# Patient Record
Sex: FEMALE | Race: ASIAN | Employment: UNEMPLOYED | URBAN - METROPOLITAN AREA
[De-identification: names, ages, dates, MRNs, and addresses within clinical notes are randomized per-mention and may not be internally consistent; named-entity substitution may affect disease eponyms.]

---

## 2017-01-27 ENCOUNTER — OFFICE VISIT (OUTPATIENT)
Dept: FAMILY MEDICINE CLINIC | Facility: CLINIC | Age: 57
End: 2017-01-27

## 2017-01-27 VITALS
HEIGHT: 63 IN | HEART RATE: 81 BPM | WEIGHT: 182 LBS | RESPIRATION RATE: 18 BRPM | BODY MASS INDEX: 32.25 KG/M2 | OXYGEN SATURATION: 97 % | SYSTOLIC BLOOD PRESSURE: 120 MMHG | TEMPERATURE: 98 F | DIASTOLIC BLOOD PRESSURE: 84 MMHG

## 2017-01-27 DIAGNOSIS — J01.90 ACUTE NON-RECURRENT SINUSITIS, UNSPECIFIED LOCATION: Primary | ICD-10-CM

## 2017-01-27 DIAGNOSIS — H90.5 SENSORINEURAL HEARING LOSS (SNHL) OF RIGHT EAR, UNSPECIFIED HEARING STATUS ON CONTRALATERAL SIDE: ICD-10-CM

## 2017-01-27 DIAGNOSIS — H72.91 PERFORATION OF TYMPANIC MEMBRANE, RIGHT: ICD-10-CM

## 2017-01-27 DIAGNOSIS — I10 ESSENTIAL HYPERTENSION: ICD-10-CM

## 2017-01-27 DIAGNOSIS — M17.0 PRIMARY OSTEOARTHRITIS OF BOTH KNEES: ICD-10-CM

## 2017-01-27 DIAGNOSIS — F41.8 ANXIETY ASSOCIATED WITH DEPRESSION: ICD-10-CM

## 2017-01-27 PROCEDURE — 99214 OFFICE O/P EST MOD 30 MIN: CPT | Performed by: FAMILY MEDICINE

## 2017-01-27 RX ORDER — FLUTICASONE PROPIONATE 50 MCG
2 SPRAY, SUSPENSION (ML) NASAL DAILY
Qty: 16 G | Refills: 0 | Status: SHIPPED | OUTPATIENT
Start: 2017-01-27 | End: 2017-03-27 | Stop reason: ALTCHOICE

## 2017-01-27 RX ORDER — CODEINE PHOSPHATE AND GUAIFENESIN 10; 100 MG/5ML; MG/5ML
SOLUTION ORAL
Qty: 180 ML | Refills: 0 | Status: SHIPPED | OUTPATIENT
Start: 2017-01-27 | End: 2017-03-27 | Stop reason: ALTCHOICE

## 2017-01-27 RX ORDER — AMOXICILLIN 875 MG/1
875 TABLET, COATED ORAL 2 TIMES DAILY
Qty: 20 TABLET | Refills: 0 | Status: SHIPPED | OUTPATIENT
Start: 2017-01-27 | End: 2017-03-27 | Stop reason: ALTCHOICE

## 2017-01-27 RX ORDER — ESCITALOPRAM OXALATE 10 MG/1
10 TABLET ORAL DAILY
Qty: 90 TABLET | Refills: 2 | Status: SHIPPED | OUTPATIENT
Start: 2017-01-27 | End: 2017-05-03 | Stop reason: ALTCHOICE

## 2017-01-27 NOTE — PROGRESS NOTES
Anayeli Martínez is a 55year old female. HPI:  Cough for one week and seems to be getting worse.    Started w/ runny nose and congestion, sore throat  Took Claritin and seemed to help w/ runny nose but nasal congestion is worse and has cough for a few days capsule (20 mg total) by mouth every morning. Disp: 90 capsule Rfl: 1   Lisinopril-Hydrochlorothiazide 10-12.5 MG Oral Tab Take 1 tablet by mouth daily.  Disp: 90 tablet Rfl: 2   Elastic Bandages & Supports (WRIST BRACE DELUXE/RIGHT S/M) Does not apply Misc mucosa: Red, edematous, cloudy drainage  NECK: supple, tender anterior cervical lymphadenopathy  LUNGS: clear to auscultation  CARDIO: RRR without murmur  EXTREMITIES: no cyanosis, clubbing or edema  NEURO: A&O x3, no focal deficits  Normal gait    ASSESSM

## 2017-03-22 ENCOUNTER — TELEPHONE (OUTPATIENT)
Dept: FAMILY MEDICINE CLINIC | Facility: CLINIC | Age: 57
End: 2017-03-22

## 2017-03-27 ENCOUNTER — OFFICE VISIT (OUTPATIENT)
Dept: FAMILY MEDICINE CLINIC | Facility: CLINIC | Age: 57
End: 2017-03-27

## 2017-03-27 ENCOUNTER — TELEPHONE (OUTPATIENT)
Dept: INTERNAL MEDICINE CLINIC | Facility: CLINIC | Age: 57
End: 2017-03-27

## 2017-03-27 DIAGNOSIS — H91.93 BILATERAL HEARING LOSS, UNSPECIFIED HEARING LOSS TYPE: ICD-10-CM

## 2017-03-27 DIAGNOSIS — G43.009 MIGRAINE WITHOUT AURA AND WITHOUT STATUS MIGRAINOSUS, NOT INTRACTABLE: Primary | ICD-10-CM

## 2017-03-27 DIAGNOSIS — H93.13 TINNITUS, BILATERAL: ICD-10-CM

## 2017-03-27 DIAGNOSIS — H72.93 PERFORATION OF TYMPANIC MEMBRANE, BILATERAL: ICD-10-CM

## 2017-03-27 PROCEDURE — 99214 OFFICE O/P EST MOD 30 MIN: CPT | Performed by: FAMILY MEDICINE

## 2017-03-27 RX ORDER — AMOXICILLIN 250 MG/1
250 CAPSULE ORAL 3 TIMES DAILY
COMMUNITY
End: 2017-05-03 | Stop reason: ALTCHOICE

## 2017-03-27 RX ORDER — MELOXICAM 15 MG/1
15 TABLET ORAL DAILY
COMMUNITY
End: 2017-11-03

## 2017-03-27 RX ORDER — BUTALBITAL, ACETAMINOPHEN AND CAFFEINE 300; 40; 50 MG/1; MG/1; MG/1
1 CAPSULE ORAL EVERY 6 HOURS PRN
Qty: 30 CAPSULE | Refills: 0 | Status: SHIPPED | OUTPATIENT
Start: 2017-03-27 | End: 2017-10-20

## 2017-03-27 NOTE — PATIENT INSTRUCTIONS
Preventing Migraine Headaches: Medicines and Lifestyle Changes     Going to bed and getting up at the same time each day, including weekends, may help prevent migraines. A migraine is a type of severe headache. Having a migraine can be very painful. Date Last Reviewed: 10/11/2015  © 2695-6024 The [address] [place] [address], [address] [place], [place] [address]. All rights reserved. This information is not intended as a substitute for professional medical care.  Always follow your healthcare Volanda Plant

## 2017-03-27 NOTE — PROGRESS NOTES
HPI:    Patient ID: Frankie Napoles is a 55year old female. HPI   50yr old female presents with daughter with c/o migraine ha and chronic ear complaints. Has chronic hx of migraine ha.  This current migraine lasting for the past week, has been experien Radiology Contrast *    Rash   PHYSICAL EXAM:   Physical Exam   Constitutional: She is oriented to person, place, and time. She appears well-developed and well-nourished. HENT:   Head: Normocephalic and atraumatic.    Right Ear: Hearing, external ear an every 6 (six) hours as needed for Pain or Headaches.            Imaging & Referrals:  ENT - INTERNAL       KM#[id number]

## 2017-03-27 NOTE — TELEPHONE ENCOUNTER
Pharmacy called stating Rx written for Butalbital-APAP-Caffeine -40 doesn't come in formulation written. I gave verbal ok for -40 formulation, same instructions.

## 2017-03-29 VITALS
TEMPERATURE: 98 F | RESPIRATION RATE: 16 BRPM | HEART RATE: 80 BPM | DIASTOLIC BLOOD PRESSURE: 90 MMHG | HEIGHT: 63 IN | SYSTOLIC BLOOD PRESSURE: 132 MMHG | OXYGEN SATURATION: 99 % | BODY MASS INDEX: 32.25 KG/M2 | WEIGHT: 182 LBS

## 2017-05-03 ENCOUNTER — OFFICE VISIT (OUTPATIENT)
Dept: FAMILY MEDICINE CLINIC | Facility: CLINIC | Age: 57
End: 2017-05-03

## 2017-05-03 VITALS
HEIGHT: 63 IN | SYSTOLIC BLOOD PRESSURE: 136 MMHG | DIASTOLIC BLOOD PRESSURE: 86 MMHG | HEART RATE: 86 BPM | WEIGHT: 186 LBS | RESPIRATION RATE: 14 BRPM | BODY MASS INDEX: 32.96 KG/M2 | OXYGEN SATURATION: 98 % | TEMPERATURE: 98 F

## 2017-05-03 DIAGNOSIS — J01.00 ACUTE MAXILLARY SINUSITIS, RECURRENCE NOT SPECIFIED: Primary | ICD-10-CM

## 2017-05-03 DIAGNOSIS — R11.0 NAUSEA: ICD-10-CM

## 2017-05-03 DIAGNOSIS — E04.1 THYROID NODULE: ICD-10-CM

## 2017-05-03 DIAGNOSIS — E01.0 THYROMEGALY: ICD-10-CM

## 2017-05-03 DIAGNOSIS — I10 ESSENTIAL HYPERTENSION: ICD-10-CM

## 2017-05-03 DIAGNOSIS — G43.009 MIGRAINE WITHOUT AURA AND WITHOUT STATUS MIGRAINOSUS, NOT INTRACTABLE: ICD-10-CM

## 2017-05-03 PROCEDURE — S0119 ONDANSETRON 4 MG: HCPCS | Performed by: FAMILY MEDICINE

## 2017-05-03 PROCEDURE — 99214 OFFICE O/P EST MOD 30 MIN: CPT | Performed by: FAMILY MEDICINE

## 2017-05-03 RX ORDER — ONDANSETRON 4 MG/1
4 TABLET, ORALLY DISINTEGRATING ORAL ONCE
Status: COMPLETED | OUTPATIENT
Start: 2017-05-03 | End: 2017-05-03

## 2017-05-03 RX ORDER — LISINOPRIL AND HYDROCHLOROTHIAZIDE 12.5; 1 MG/1; MG/1
1 TABLET ORAL DAILY
Qty: 90 TABLET | Refills: 1 | Status: SHIPPED | OUTPATIENT
Start: 2017-05-03 | End: 2017-07-05

## 2017-05-03 RX ORDER — AZITHROMYCIN 250 MG/1
TABLET, FILM COATED ORAL
Qty: 6 TABLET | Refills: 0 | Status: SHIPPED | OUTPATIENT
Start: 2017-05-03 | End: 2017-10-20 | Stop reason: ALTCHOICE

## 2017-05-03 RX ORDER — ONDANSETRON 4 MG/1
4 TABLET, ORALLY DISINTEGRATING ORAL EVERY 8 HOURS PRN
Qty: 20 TABLET | Refills: 0 | Status: SHIPPED | OUTPATIENT
Start: 2017-05-03 | End: 2018-06-22 | Stop reason: ALTCHOICE

## 2017-05-03 RX ADMIN — ONDANSETRON 4 MG: 4 TABLET, ORALLY DISINTEGRATING ORAL at 12:13:00

## 2017-05-03 NOTE — PROGRESS NOTES
HPI:   Angelina Montano is a 55year old female who presents for upper respiratory symptoms for  12  days. Patient reports sore throat, congestion, low grade fever, dry cough, sinus pain, OTC cold meds have not been helping.  Recently returned from [facility] [address] Coronary artery disease[other] [OTHER] Father    • TB[other] [OTHER] Father    • Dementia Mother    • Hypertension Mother    • Breast Cancer Sister 36     estimated age   • Coronary artery disease[other] [OTHER] Brother         Smoking Status: Never Smoker intractable  3.  Nausea  - neuro exam unremarkable  - cont conservative tx  - has taken fioricet earlier today and symptoms improving, cont prn  - given zofran in office and provided rx   - avoidance of triggers and monitor for worsening symptoms  - ondanse

## 2017-07-05 ENCOUNTER — OFFICE VISIT (OUTPATIENT)
Dept: FAMILY MEDICINE CLINIC | Facility: CLINIC | Age: 57
End: 2017-07-05

## 2017-07-05 VITALS
SYSTOLIC BLOOD PRESSURE: 130 MMHG | HEIGHT: 63 IN | OXYGEN SATURATION: 99 % | TEMPERATURE: 99 F | BODY MASS INDEX: 32.47 KG/M2 | HEART RATE: 85 BPM | RESPIRATION RATE: 16 BRPM | DIASTOLIC BLOOD PRESSURE: 82 MMHG | WEIGHT: 183.25 LBS

## 2017-07-05 DIAGNOSIS — I10 ESSENTIAL HYPERTENSION: ICD-10-CM

## 2017-07-05 DIAGNOSIS — H72.92 PERFORATED TYMPANIC MEMBRANE, LEFT: ICD-10-CM

## 2017-07-05 DIAGNOSIS — R07.89 CHEST PRESSURE: Primary | ICD-10-CM

## 2017-07-05 DIAGNOSIS — R06.00 DOE (DYSPNEA ON EXERTION): ICD-10-CM

## 2017-07-05 DIAGNOSIS — H93.13 BILATERAL TINNITUS: ICD-10-CM

## 2017-07-05 PROCEDURE — 99214 OFFICE O/P EST MOD 30 MIN: CPT | Performed by: FAMILY MEDICINE

## 2017-07-05 PROCEDURE — 93000 ELECTROCARDIOGRAM COMPLETE: CPT | Performed by: FAMILY MEDICINE

## 2017-07-05 RX ORDER — LISINOPRIL AND HYDROCHLOROTHIAZIDE 12.5; 1 MG/1; MG/1
1 TABLET ORAL DAILY
Qty: 90 TABLET | Refills: 1 | Status: SHIPPED | OUTPATIENT
Start: 2017-07-05 | End: 2018-04-19

## 2017-07-09 NOTE — PROGRESS NOTES
HPI:    Patient ID: Jc De La O is a 55year old female. HPI  50yr old female presents with c/o intermittent, chest pain, MCLAIN, f/u on tinnitus and HTN.   States she has had 3 episodes of L sided anterior cp that lasted about 20 min after she was doin Contrast *    Rash   PHYSICAL EXAM:   Physical Exam   Constitutional: She is oriented to person, place, and time. She appears well-developed and well-nourished. HENT:   Head: Normocephalic and atraumatic.    Right Ear: Hearing, external ear and ear canal Perforated tympanic membrane, left  - advised starting claritin 10mg po qpm, reviewed indications, dosing and SEs  - will refer to ENT, Dr. Beth Brizuela for further eval  - ENT - INTERNAL    Pt understands and agrees with tx plan  RTC after stress test, soone

## 2017-07-10 ENCOUNTER — MED REC SCAN ONLY (OUTPATIENT)
Dept: FAMILY MEDICINE CLINIC | Facility: CLINIC | Age: 57
End: 2017-07-10

## 2017-07-10 PROBLEM — H90.3 SENSORINEURAL HEARING LOSS, BILATERAL: Status: ACTIVE | Noted: 2017-07-10

## 2017-07-20 ENCOUNTER — HOSPITAL ENCOUNTER (OUTPATIENT)
Dept: CV DIAGNOSTICS | Age: 57
Discharge: HOME | End: 2017-07-20
Attending: FAMILY MEDICINE
Payer: COMMERCIAL

## 2017-07-20 DIAGNOSIS — I10 ESSENTIAL HYPERTENSION: ICD-10-CM

## 2017-07-20 DIAGNOSIS — R06.00 DOE (DYSPNEA ON EXERTION): ICD-10-CM

## 2017-07-20 DIAGNOSIS — R07.89 CHEST PRESSURE: ICD-10-CM

## 2017-07-20 PROCEDURE — 93018 CV STRESS TEST I&R ONLY: CPT | Performed by: FAMILY MEDICINE

## 2017-07-20 PROCEDURE — 78452 HT MUSCLE IMAGE SPECT MULT: CPT | Performed by: FAMILY MEDICINE

## 2017-07-20 PROCEDURE — 93017 CV STRESS TEST TRACING ONLY: CPT | Performed by: FAMILY MEDICINE

## 2017-07-30 ENCOUNTER — HOSPITAL ENCOUNTER (OUTPATIENT)
Dept: ULTRASOUND IMAGING | Age: 57
Discharge: HOME | End: 2017-07-30
Attending: FAMILY MEDICINE
Payer: COMMERCIAL

## 2017-07-30 DIAGNOSIS — E01.0 THYROMEGALY: ICD-10-CM

## 2017-07-30 DIAGNOSIS — E04.1 THYROID NODULE: ICD-10-CM

## 2017-07-30 PROCEDURE — 76536 US EXAM OF HEAD AND NECK: CPT | Performed by: FAMILY MEDICINE

## 2017-10-20 ENCOUNTER — OFFICE VISIT (OUTPATIENT)
Dept: FAMILY MEDICINE CLINIC | Facility: CLINIC | Age: 57
End: 2017-10-20

## 2017-10-20 VITALS
DIASTOLIC BLOOD PRESSURE: 82 MMHG | SYSTOLIC BLOOD PRESSURE: 130 MMHG | BODY MASS INDEX: 33.52 KG/M2 | WEIGHT: 189.19 LBS | TEMPERATURE: 98 F | RESPIRATION RATE: 16 BRPM | HEART RATE: 92 BPM | HEIGHT: 63 IN | OXYGEN SATURATION: 98 %

## 2017-10-20 DIAGNOSIS — G89.29 CHRONIC PAIN OF LEFT KNEE: ICD-10-CM

## 2017-10-20 DIAGNOSIS — M17.12 PRIMARY OSTEOARTHRITIS OF LEFT KNEE: ICD-10-CM

## 2017-10-20 DIAGNOSIS — R73.01 IMPAIRED FASTING GLUCOSE: ICD-10-CM

## 2017-10-20 DIAGNOSIS — M25.562 CHRONIC PAIN OF LEFT KNEE: ICD-10-CM

## 2017-10-20 DIAGNOSIS — E55.9 VITAMIN D DEFICIENCY: ICD-10-CM

## 2017-10-20 DIAGNOSIS — G43.009 MIGRAINE WITHOUT AURA AND WITHOUT STATUS MIGRAINOSUS, NOT INTRACTABLE: Primary | ICD-10-CM

## 2017-10-20 DIAGNOSIS — Z00.00 LABORATORY EXAM ORDERED AS PART OF ROUTINE GENERAL MEDICAL EXAMINATION: ICD-10-CM

## 2017-10-20 PROCEDURE — 99214 OFFICE O/P EST MOD 30 MIN: CPT | Performed by: FAMILY MEDICINE

## 2017-10-20 RX ORDER — BUTALBITAL, ACETAMINOPHEN AND CAFFEINE 300; 40; 50 MG/1; MG/1; MG/1
1 CAPSULE ORAL EVERY 6 HOURS PRN
Qty: 30 CAPSULE | Refills: 0 | Status: SHIPPED | OUTPATIENT
Start: 2017-10-20 | End: 2017-11-03

## 2017-10-20 NOTE — PROGRESS NOTES
HPI:    Patient ID: Angelina Montano is a 55year old female. HPI   50yr old female presents with c/o migraine headaches and chronic L knee pain. Chronic migraines, this episode started again about 2d ago.  Notes L sided behind her eye, with photophobia Rfl: 1     Allergies:  Imitrex [Sumatripta*    Nausea only    Comment:bleeding  Imitrex [Sumatripta*    Hives  Nexium [Esomeprazol*    Itching  Pollen                    Radiology Contrast *    Rash   PHYSICAL EXAM:   Physical Exam   Constitutional: She is INTERNAL    4. Vitamin D deficiency  - cont vitamin d3 2000u daily  - VITAMIN D, 25-HYDROXY; Future    5. Laboratory exam ordered as part of routine general medical examination  6.  Impaired fasting glucose  - will check fasting labs    Pt declines influenz

## 2017-10-24 ENCOUNTER — LAB ENCOUNTER (OUTPATIENT)
Dept: LAB | Age: 57
End: 2017-10-24
Attending: FAMILY MEDICINE
Payer: COMMERCIAL

## 2017-10-24 DIAGNOSIS — R73.01 IMPAIRED FASTING GLUCOSE: ICD-10-CM

## 2017-10-24 DIAGNOSIS — E55.9 VITAMIN D DEFICIENCY: ICD-10-CM

## 2017-10-24 DIAGNOSIS — Z00.00 LABORATORY EXAM ORDERED AS PART OF ROUTINE GENERAL MEDICAL EXAMINATION: ICD-10-CM

## 2017-10-24 PROCEDURE — 36415 COLL VENOUS BLD VENIPUNCTURE: CPT

## 2017-10-24 PROCEDURE — 82306 VITAMIN D 25 HYDROXY: CPT

## 2017-10-24 PROCEDURE — 83036 HEMOGLOBIN GLYCOSYLATED A1C: CPT

## 2017-10-24 PROCEDURE — 80053 COMPREHEN METABOLIC PANEL: CPT

## 2017-10-24 PROCEDURE — 80061 LIPID PANEL: CPT

## 2017-10-24 PROCEDURE — 85025 COMPLETE CBC W/AUTO DIFF WBC: CPT

## 2017-10-24 PROCEDURE — 84443 ASSAY THYROID STIM HORMONE: CPT

## 2017-10-28 ENCOUNTER — HOSPITAL ENCOUNTER (EMERGENCY)
Facility: HOSPITAL | Age: 57
Discharge: HOME | End: 2017-10-29
Attending: EMERGENCY MEDICINE
Payer: COMMERCIAL

## 2017-10-28 ENCOUNTER — TELEPHONE (OUTPATIENT)
Dept: FAMILY MEDICINE CLINIC | Facility: CLINIC | Age: 57
End: 2017-10-28

## 2017-10-28 ENCOUNTER — APPOINTMENT (OUTPATIENT)
Dept: CT IMAGING | Facility: HOSPITAL | Age: 57
End: 2017-10-28
Attending: EMERGENCY MEDICINE
Payer: COMMERCIAL

## 2017-10-28 VITALS
WEIGHT: 187.38 LBS | HEART RATE: 88 BPM | TEMPERATURE: 99 F | BODY MASS INDEX: 33.2 KG/M2 | HEIGHT: 63 IN | OXYGEN SATURATION: 97 % | RESPIRATION RATE: 20 BRPM | SYSTOLIC BLOOD PRESSURE: 127 MMHG | DIASTOLIC BLOOD PRESSURE: 58 MMHG

## 2017-10-28 DIAGNOSIS — R51.9 RECURRENT HEADACHE: Primary | ICD-10-CM

## 2017-10-28 PROCEDURE — 96375 TX/PRO/DX INJ NEW DRUG ADDON: CPT

## 2017-10-28 PROCEDURE — 96374 THER/PROPH/DIAG INJ IV PUSH: CPT

## 2017-10-28 PROCEDURE — 85025 COMPLETE CBC W/AUTO DIFF WBC: CPT | Performed by: EMERGENCY MEDICINE

## 2017-10-28 PROCEDURE — 96361 HYDRATE IV INFUSION ADD-ON: CPT

## 2017-10-28 PROCEDURE — 99284 EMERGENCY DEPT VISIT MOD MDM: CPT

## 2017-10-28 PROCEDURE — 80048 BASIC METABOLIC PNL TOTAL CA: CPT | Performed by: EMERGENCY MEDICINE

## 2017-10-28 PROCEDURE — 70450 CT HEAD/BRAIN W/O DYE: CPT | Performed by: EMERGENCY MEDICINE

## 2017-10-28 RX ORDER — KETOROLAC TROMETHAMINE 30 MG/ML
30 INJECTION, SOLUTION INTRAMUSCULAR; INTRAVENOUS ONCE
Status: COMPLETED | OUTPATIENT
Start: 2017-10-28 | End: 2017-10-28

## 2017-10-28 RX ORDER — ONDANSETRON 2 MG/ML
4 INJECTION INTRAMUSCULAR; INTRAVENOUS ONCE
Status: COMPLETED | OUTPATIENT
Start: 2017-10-28 | End: 2017-10-28

## 2017-10-28 RX ORDER — DIPHENHYDRAMINE HYDROCHLORIDE 50 MG/ML
25 INJECTION INTRAMUSCULAR; INTRAVENOUS ONCE
Status: COMPLETED | OUTPATIENT
Start: 2017-10-28 | End: 2017-10-28

## 2017-10-28 RX ORDER — SODIUM CHLORIDE 9 MG/ML
1000 INJECTION, SOLUTION INTRAVENOUS ONCE
Status: COMPLETED | OUTPATIENT
Start: 2017-10-28 | End: 2017-10-28

## 2017-10-29 NOTE — ED PROVIDER NOTES
Patient Seen in: [facility] Emergency Department    History   Patient presents with:  Headache (neurologic)    Stated Complaint: MIGRAINE    HPI    60-year-old woman coming in with headache started about a week ago gradual onset.   Patient has had na reviewed. All other systems reviewed and negative except as noted above. PSFH elements reviewed from today and agreed except as otherwise stated in HPI. Physical Exam   ED Triage Vitals [10/28/17 2205]  BP: 156/81  Pulse: 76  Resp: 24  Temp: 98. ---------                               -----------         ------                     CBC W/ DIFFERENTIAL[[id number]]          Abnormal            Final result                 Please view results for these tests on the individual orders.    RAINBOW DRAW L rule out infectious etiology although my suspicion for this is low. It is probably related to tension. Regardless, patient refused this bar puncture. She would like to go home at this time.   She was given detailed verbal and written instructions about h

## 2017-10-29 NOTE — TELEPHONE ENCOUNTER
Pls call pt and check on status?  F/u in office this week to check on status, review labs, and due for physical.

## 2017-10-29 NOTE — ED NOTES
Pt presents holding head. Will not uncover head due to pain. Family with her. Needs assist to get into gown.   Family states pt's migraine pattern is like this especially during a 24hr period during her migraine episodes

## 2017-10-29 NOTE — TELEPHONE ENCOUNTER
called stating he was taking his wife to ER for persistent severe migraine headache.  Asked if he could take her to [facility] as it's closer, but I recommended Carlo Chavez since we don't get records directly or promptly from [address] [place] [address]

## 2017-10-30 NOTE — TELEPHONE ENCOUNTER
----- Message from [facility], DO sent at 10/29/2017 11:22 PM CDT -----  Pls have pt f/u in office for test results.

## 2017-10-31 PROBLEM — M17.12 PRIMARY OSTEOARTHRITIS OF LEFT KNEE: Status: ACTIVE | Noted: 2017-10-31

## 2017-11-01 ENCOUNTER — TELEPHONE (OUTPATIENT)
Dept: FAMILY MEDICINE CLINIC | Facility: CLINIC | Age: 57
End: 2017-11-01

## 2017-11-01 NOTE — TELEPHONE ENCOUNTER
Left message for Crispin regarding patient, physical therapy was ordered by Dr. Porfirio Kim yesterday. Patient may call to schedule. Provided phone number for [facility]. May call if there are questions.

## 2017-11-01 NOTE — TELEPHONE ENCOUNTER
Pt's  called stating Pt saw Dr [place] Fernandez for the knee problems, was given an injection & was told she needs to do Physical therapy. Orders needed.

## 2017-11-02 ENCOUNTER — TELEPHONE (OUTPATIENT)
Dept: FAMILY MEDICINE CLINIC | Facility: CLINIC | Age: 57
End: 2017-11-02

## 2017-11-02 DIAGNOSIS — G43.009 MIGRAINE WITHOUT AURA AND WITHOUT STATUS MIGRAINOSUS, NOT INTRACTABLE: Primary | ICD-10-CM

## 2017-11-02 RX ORDER — BUTALBITAL, ACETAMINOPHEN AND CAFFEINE 50; 325; 40 MG/1; MG/1; MG/1
1 TABLET ORAL EVERY 6 HOURS PRN
Qty: 30 TABLET | Refills: 0 | Status: SHIPPED | OUTPATIENT
Start: 2017-11-02 | End: 2017-11-03

## 2017-11-02 NOTE — TELEPHONE ENCOUNTER
[address] called stating \"Butalbital\" mg needs to be changed. Written as [phone]. Come in [phone].

## 2017-11-03 ENCOUNTER — OFFICE VISIT (OUTPATIENT)
Dept: FAMILY MEDICINE CLINIC | Facility: CLINIC | Age: 57
End: 2017-11-03

## 2017-11-03 VITALS
TEMPERATURE: 99 F | DIASTOLIC BLOOD PRESSURE: 82 MMHG | BODY MASS INDEX: 33.13 KG/M2 | HEIGHT: 63 IN | HEART RATE: 93 BPM | OXYGEN SATURATION: 97 % | RESPIRATION RATE: 18 BRPM | SYSTOLIC BLOOD PRESSURE: 130 MMHG | WEIGHT: 187 LBS

## 2017-11-03 DIAGNOSIS — I10 ESSENTIAL HYPERTENSION: ICD-10-CM

## 2017-11-03 DIAGNOSIS — R10.9 FLANK PAIN: ICD-10-CM

## 2017-11-03 DIAGNOSIS — H92.03 OTALGIA OF BOTH EARS: ICD-10-CM

## 2017-11-03 DIAGNOSIS — Z12.11 SCREENING FOR COLON CANCER: ICD-10-CM

## 2017-11-03 DIAGNOSIS — R73.01 IFG (IMPAIRED FASTING GLUCOSE): ICD-10-CM

## 2017-11-03 DIAGNOSIS — L98.9 LESION OF SKIN OF FACE: ICD-10-CM

## 2017-11-03 DIAGNOSIS — R10.2 PELVIC PAIN: ICD-10-CM

## 2017-11-03 DIAGNOSIS — Z00.00 ROUTINE PHYSICAL EXAMINATION: Primary | ICD-10-CM

## 2017-11-03 DIAGNOSIS — Z80.3 FAMILY HISTORY OF BREAST CANCER: ICD-10-CM

## 2017-11-03 DIAGNOSIS — G43.009 MIGRAINE WITHOUT AURA AND WITHOUT STATUS MIGRAINOSUS, NOT INTRACTABLE: ICD-10-CM

## 2017-11-03 DIAGNOSIS — K21.9 GASTROESOPHAGEAL REFLUX DISEASE WITHOUT ESOPHAGITIS: ICD-10-CM

## 2017-11-03 DIAGNOSIS — Z23 NEED FOR INFLUENZA VACCINATION: ICD-10-CM

## 2017-11-03 DIAGNOSIS — N64.4 BREAST TENDERNESS IN FEMALE: ICD-10-CM

## 2017-11-03 PROCEDURE — 81003 URINALYSIS AUTO W/O SCOPE: CPT | Performed by: FAMILY MEDICINE

## 2017-11-03 PROCEDURE — 90471 IMMUNIZATION ADMIN: CPT | Performed by: FAMILY MEDICINE

## 2017-11-03 PROCEDURE — 99214 OFFICE O/P EST MOD 30 MIN: CPT | Performed by: FAMILY MEDICINE

## 2017-11-03 PROCEDURE — 90686 IIV4 VACC NO PRSV 0.5 ML IM: CPT | Performed by: FAMILY MEDICINE

## 2017-11-03 PROCEDURE — 99396 PREV VISIT EST AGE 40-64: CPT | Performed by: FAMILY MEDICINE

## 2017-11-03 RX ORDER — TOPIRAMATE 50 MG/1
TABLET, FILM COATED ORAL
Qty: 30 TABLET | Refills: 2 | Status: SHIPPED | OUTPATIENT
Start: 2017-11-03 | End: 2018-06-22 | Stop reason: ALTCHOICE

## 2017-11-03 RX ORDER — HYDROCODONE BITARTRATE AND ACETAMINOPHEN 5; 325 MG/1; MG/1
1 TABLET ORAL EVERY 8 HOURS PRN
Qty: 40 TABLET | Refills: 0 | Status: SHIPPED | OUTPATIENT
Start: 2017-11-03 | End: 2018-08-27 | Stop reason: ALTCHOICE

## 2017-11-03 RX ORDER — ACETIC ACID 20.65 MG/ML
2 SOLUTION AURICULAR (OTIC) 3 TIMES DAILY PRN
Qty: 1 BOTTLE | Refills: 0 | Status: SHIPPED | OUTPATIENT
Start: 2017-11-03 | End: 2018-08-27 | Stop reason: ALTCHOICE

## 2017-11-03 RX ORDER — OMEPRAZOLE 20 MG/1
20 CAPSULE, DELAYED RELEASE ORAL EVERY MORNING
Qty: 90 CAPSULE | Refills: 2 | Status: SHIPPED | OUTPATIENT
Start: 2017-11-03 | End: 2018-08-27

## 2017-11-03 NOTE — PROGRESS NOTES
Beth Warren is a 55year old female.   HPI:  Patient is here for physical today  Also for f/u on recent ER visit for severe migraine headache  Has been having frequent migraines and some are very intense  Usually Starts on top of head then generalizes, Tablet Dispersible Take 1 tablet (4 mg total) by mouth every 8 (eight) hours as needed for Nausea. Disp: 20 tablet Rfl: 0   aspirin 81 MG Oral Tab Take 1 tablet (81 mg total) by mouth daily.  Disp: 30 tablet Rfl: 11         Past Medical History:   Diagnosis adenopathy,noTM  LUNGS: clear to auscultation  CARDIO: RRR without murmur  GI: NABS, soft, obese, no tenderness, no masses, no HSM, ND  No CVAT  EXTREMITIES: no cyanosis, clubbing or edema  Left knee with medial joint line tenderness.   Mild swelling versus nightly, 25 mg nightly for 6 days, then 50 mg nightly. Discussed possible side effects, and to call if any. Norco as needed. Warned of possible side effects including sedation and nausea. Avoid dietary triggers.   Advised on coping mechanisms for stress

## 2017-11-07 ENCOUNTER — OFFICE VISIT (OUTPATIENT)
Dept: PHYSICAL THERAPY | Age: 57
End: 2017-11-07
Attending: ORTHOPAEDIC SURGERY
Payer: COMMERCIAL

## 2017-11-07 DIAGNOSIS — M25.562 LEFT KNEE PAIN, UNSPECIFIED CHRONICITY: ICD-10-CM

## 2017-11-07 DIAGNOSIS — M17.12 PRIMARY OSTEOARTHRITIS OF LEFT KNEE: ICD-10-CM

## 2017-11-07 PROCEDURE — 97110 THERAPEUTIC EXERCISES: CPT

## 2017-11-07 PROCEDURE — 97162 PT EVAL MOD COMPLEX 30 MIN: CPT

## 2017-11-07 PROCEDURE — 97140 MANUAL THERAPY 1/> REGIONS: CPT

## 2017-11-07 NOTE — PROGRESS NOTES
KNEE EVALUATION:   Referring Physician: Dr. Frederick Sevilla  Diagnosis: Left knee pain, unspecified chronicity, Primary osteoarthritis of left knee     Date of Service: 11/7/2017     PATIENT SUMMARY   Kathy Eubanks is a 55year old female who presents to therapy to better following PT for knees in the past. Pt goals include to decrease pain and be able to walk better so that she can run errands and take care of her home. Past medical history was reviewed with Fernanda.  Significant findings include:  Past Medical His waddling pattern, wide LISA, and minimal hip/knee flexion in swing. Observation/Skin: LE skin closed and intact without any notable bruising or redness. Edema of both knees, L>R.    Palpation: Patient extremely TTP from groin to inferior patellar poles ERIC Time: 65 min     PLAN OF CARE:    Goals: (To be met in 10 visits)   1. Patient will improve ERIC knee AROM flexion to > 115 degrees to improve ability to perform squatting, stair negotiation, sitting comfortably, and dressing.    2. Patient will improve quad

## 2017-11-09 ENCOUNTER — TELEPHONE (OUTPATIENT)
Dept: FAMILY MEDICINE CLINIC | Facility: CLINIC | Age: 57
End: 2017-11-09

## 2017-11-09 ENCOUNTER — OFFICE VISIT (OUTPATIENT)
Dept: PHYSICAL THERAPY | Age: 57
End: 2017-11-09
Attending: ORTHOPAEDIC SURGERY
Payer: COMMERCIAL

## 2017-11-09 PROCEDURE — 97140 MANUAL THERAPY 1/> REGIONS: CPT

## 2017-11-09 PROCEDURE — 97110 THERAPEUTIC EXERCISES: CPT

## 2017-11-09 NOTE — TELEPHONE ENCOUNTER
Confirmed with patients pharmacy that prescription for butalbital was discontinued and patient is no longer taking this

## 2017-11-09 NOTE — PROGRESS NOTES
Dx: Left knee pain, unspecified chronicity, Primary osteoarthritis of left knee             Authorized # of Visits:  10         Next MD visit: none scheduled  Fall Risk: standard         Precautions: n/a             Subjective: Patient reports that she tri 6/ Date:               TX#: 7/ Date:               TX#: 8/   NuStep L 1 x 4 min         - QS x 10 R/L  - supine alt march x 10 R/L         - Heel slides x 10 R/L  - Mini bridge x 10         - hooklying add ball ball squeeze x 10 R/L  - seated abd YTB x 10

## 2017-11-13 ENCOUNTER — HOSPITAL ENCOUNTER (OUTPATIENT)
Dept: ULTRASOUND IMAGING | Age: 57
Discharge: HOME | End: 2017-11-13
Attending: FAMILY MEDICINE
Payer: COMMERCIAL

## 2017-11-13 DIAGNOSIS — R10.2 PELVIC PAIN: ICD-10-CM

## 2017-11-13 PROCEDURE — 76856 US EXAM PELVIC COMPLETE: CPT | Performed by: FAMILY MEDICINE

## 2017-11-13 PROCEDURE — 76830 TRANSVAGINAL US NON-OB: CPT | Performed by: FAMILY MEDICINE

## 2017-11-16 ENCOUNTER — OFFICE VISIT (OUTPATIENT)
Dept: PHYSICAL THERAPY | Age: 57
End: 2017-11-16
Attending: ORTHOPAEDIC SURGERY
Payer: COMMERCIAL

## 2017-11-16 PROCEDURE — 97140 MANUAL THERAPY 1/> REGIONS: CPT

## 2017-11-16 PROCEDURE — 97110 THERAPEUTIC EXERCISES: CPT

## 2017-11-16 NOTE — PROGRESS NOTES
Dx: Left knee pain, unspecified chronicity, Primary osteoarthritis of left knee             Authorized # of Visits:  10         Next MD visit: none scheduled  Fall Risk: standard         Precautions: n/a             Subjective: Patient reports continues to Date:               TX#: 8/   NuStep L 1 x 4 min NuStep L 1 x 4 min        - QS x 10 R/L  - supine alt march x 10 R/L - QS x 10 R/L  - SLR x 10 R/L        - Heel slides x 10 R/L  - Mini bridge x 10     - slight inc height x 10        - hooklying add ball b

## 2017-11-28 ENCOUNTER — OFFICE VISIT (OUTPATIENT)
Dept: PHYSICAL THERAPY | Age: 57
End: 2017-11-28
Attending: ORTHOPAEDIC SURGERY
Payer: COMMERCIAL

## 2017-11-28 PROCEDURE — 97110 THERAPEUTIC EXERCISES: CPT

## 2017-11-28 PROCEDURE — 97140 MANUAL THERAPY 1/> REGIONS: CPT

## 2017-11-28 NOTE — PROGRESS NOTES
Dx: Left knee pain, unspecified chronicity, Primary osteoarthritis of left knee             Authorized # of Visits:  10         Next MD visit: none scheduled  Fall Risk: standard         Precautions: n/a             Subjective: Patient has not been seen fo issued    Plan: Continue ERIC knee stretching and strengthening as tolerated.  Inc time spent in [facility] as tolerated   Date: 11/9/2017 TX#: 2/10 Date: 11/16/17 TX#: 3/10   Date: 11/28/17 TX#: 4/10 Date:               TX#: 5/ Date:               TX#: 6/ Date:

## 2017-11-30 ENCOUNTER — OFFICE VISIT (OUTPATIENT)
Dept: PHYSICAL THERAPY | Age: 57
End: 2017-11-30
Attending: ORTHOPAEDIC SURGERY
Payer: COMMERCIAL

## 2017-11-30 PROCEDURE — 97140 MANUAL THERAPY 1/> REGIONS: CPT

## 2017-11-30 PROCEDURE — 97110 THERAPEUTIC EXERCISES: CPT

## 2017-11-30 NOTE — PROGRESS NOTES
Dx: Left knee pain, unspecified chronicity, Primary osteoarthritis of left knee             Authorized # of Visits:  10         Next MD visit: none scheduled  Fall Risk: standard         Precautions: n/a             Subjective: Patient reports she felt bet issued    Plan: Continue ERIC knee stretching and strengthening as tolerated. Inc time spent in WB as tolerated. Continue with therex/theract prior to manual therapy.    Date: 11/9/2017 TX#: 2/10 Date: 11/16/17 TX#: 3/10   Date: 11/28/17 TX#: 4/10 Date: 11/3 increase functional loading through LE. Good response to manual therapy end of session.    HEP: heel slides, QS, seated hip abd with YTB, bridges, SLR, seated HS stretch, 4 way hip with YTB, DL HR    Charges: Manual x 1, Therex x 2       Total Timed Treatme

## 2017-12-05 ENCOUNTER — HOSPITAL ENCOUNTER (OUTPATIENT)
Dept: MAMMOGRAPHY | Age: 57
Discharge: HOME | End: 2017-12-05
Attending: FAMILY MEDICINE
Payer: COMMERCIAL

## 2017-12-05 ENCOUNTER — OFFICE VISIT (OUTPATIENT)
Dept: PHYSICAL THERAPY | Age: 57
End: 2017-12-05
Attending: ORTHOPAEDIC SURGERY
Payer: COMMERCIAL

## 2017-12-05 ENCOUNTER — HOSPITAL ENCOUNTER (OUTPATIENT)
Dept: ULTRASOUND IMAGING | Age: 57
Discharge: HOME | End: 2017-12-05
Attending: FAMILY MEDICINE
Payer: COMMERCIAL

## 2017-12-05 DIAGNOSIS — N64.4 BREAST TENDERNESS IN FEMALE: ICD-10-CM

## 2017-12-05 DIAGNOSIS — Z80.3 FAMILY HISTORY OF BREAST CANCER: ICD-10-CM

## 2017-12-05 PROCEDURE — 77066 DX MAMMO INCL CAD BI: CPT | Performed by: FAMILY MEDICINE

## 2017-12-05 PROCEDURE — 76642 ULTRASOUND BREAST LIMITED: CPT | Performed by: FAMILY MEDICINE

## 2017-12-05 PROCEDURE — 97140 MANUAL THERAPY 1/> REGIONS: CPT

## 2017-12-05 PROCEDURE — 97110 THERAPEUTIC EXERCISES: CPT

## 2017-12-05 NOTE — PROGRESS NOTES
Dx: Left knee pain, unspecified chronicity, Primary osteoarthritis of left knee             Authorized # of Visits:  10         Next MD visit: none scheduled  Fall Risk: standard         Precautions: n/a             Subjective: Patient reports that she is tolerated. Continue with therex/theract prior to manual therapy. Follow-up on kinesiotaping response.    Date: 11/9/2017 TX#: 2/10 Date: 11/16/17 TX#: 3/10   Date: 11/28/17 TX#: 4/10 Date: 11/30/17  TX#: 5/10 Date: 12/5/17  TX#: 6/10 Date:               TX# clinic - attempted quad stretch and oscillation, poor tolerance  Attempted manual HS stretch, fair tolerance Seated HS stretch 3 x 20s ea Manual HS and calf stretching x 3 min     Skilled Services: Manual therapy to improve mobility, dec pain, and dec sens

## 2017-12-12 ENCOUNTER — APPOINTMENT (OUTPATIENT)
Dept: PHYSICAL THERAPY | Age: 57
End: 2017-12-12
Attending: ORTHOPAEDIC SURGERY
Payer: COMMERCIAL

## 2017-12-12 ENCOUNTER — OFFICE VISIT (OUTPATIENT)
Dept: PHYSICAL THERAPY | Age: 57
End: 2017-12-12
Attending: ORTHOPAEDIC SURGERY
Payer: COMMERCIAL

## 2017-12-12 PROCEDURE — 97140 MANUAL THERAPY 1/> REGIONS: CPT

## 2017-12-12 PROCEDURE — 97110 THERAPEUTIC EXERCISES: CPT

## 2017-12-12 NOTE — PROGRESS NOTES
Dx: Left knee pain, unspecified chronicity, Primary osteoarthritis of left knee             Authorized # of Visits:  10         Next MD visit: none scheduled  Fall Risk: standard         Precautions: n/a             Subjective: Patient reports that she is tolerated. Continue with therex/theract prior to manual therapy. Follow-up on kinesiotaping response.    Date: 11/9/2017 TX#: 2/10 Date: 11/16/17 TX#: 3/10   Date: 11/28/17 TX#: 4/10 Date: 11/30/17  TX#: 5/10 Date: 12/5/17  TX#: 6/10 Date: 12/12/17   TX#: 7 clinic - attempted quad stretch and oscillation, poor tolerance  Attempted manual HS stretch, fair tolerance Seated HS stretch 3 x 20s ea Manual HS and calf stretching x 3 min     Skilled Services: Manual therapy to improve mobility, dec pain, and dec sens

## 2017-12-12 NOTE — PROGRESS NOTES
Dx: Left knee pain, unspecified chronicity, Primary osteoarthritis of left knee             Authorized # of Visits:  10         Next MD visit: none scheduled  Fall Risk: standard         Precautions: n/a             Subjective: Patient reports not feeling ambulate community distances. - progress  4. Patient will improve SLS to >10s to improve safety and independence with gait on uneven surfaces such as grass. -progress, alt cone stack taps  5.  Patient will be independent and compliant in HEP to maintain pro needed x 10 R/L    - Shuttle  - DLS, 4B x 15 Shuttle  - DLS, 4B x 15  - SLS, 2B x 8 R/L (hard, tired) Shuttle  - DLS, 3B x 10, 4B x 10  - SLS, 2.5B x 10 R/L Shuttle  - DLS, 4B x 15  - SLS, 3B x 10 R/L Shuttle  - DLS, 5B x 15  - SLS, 3B x 15 R/L    BLE STM

## 2017-12-14 ENCOUNTER — APPOINTMENT (OUTPATIENT)
Dept: PHYSICAL THERAPY | Age: 57
End: 2017-12-14
Attending: ORTHOPAEDIC SURGERY
Payer: COMMERCIAL

## 2017-12-28 ENCOUNTER — TELEPHONE (OUTPATIENT)
Dept: PHYSICAL THERAPY | Age: 57
End: 2017-12-28

## 2017-12-28 NOTE — TELEPHONE ENCOUNTER
Have called patient several times in regard to PT over the past few weeks.  Have left several VM, spoken with patient, and spoken with patient's  with instructions on appointment times, clinic address, and phone number to call to follow-up if not com

## 2018-04-19 DIAGNOSIS — I10 ESSENTIAL HYPERTENSION: ICD-10-CM

## 2018-04-20 RX ORDER — LISINOPRIL AND HYDROCHLOROTHIAZIDE 12.5; 1 MG/1; MG/1
TABLET ORAL
Qty: 90 TABLET | Refills: 0 | Status: SHIPPED | OUTPATIENT
Start: 2018-04-20 | End: 2018-08-21

## 2018-04-20 NOTE — TELEPHONE ENCOUNTER
Pt's  called to ck status on refill informed him refill requests take 48-72 hrs for approval, he would like call when Rx is sent in.

## 2018-06-22 ENCOUNTER — OFFICE VISIT (OUTPATIENT)
Dept: FAMILY MEDICINE CLINIC | Facility: CLINIC | Age: 58
End: 2018-06-22

## 2018-06-22 VITALS
RESPIRATION RATE: 18 BRPM | OXYGEN SATURATION: 98 % | TEMPERATURE: 98 F | SYSTOLIC BLOOD PRESSURE: 140 MMHG | HEART RATE: 87 BPM | DIASTOLIC BLOOD PRESSURE: 78 MMHG | HEIGHT: 63 IN | WEIGHT: 181 LBS | BODY MASS INDEX: 32.07 KG/M2

## 2018-06-22 DIAGNOSIS — R53.83 FATIGUE, UNSPECIFIED TYPE: ICD-10-CM

## 2018-06-22 DIAGNOSIS — M17.12 PRIMARY OSTEOARTHRITIS OF LEFT KNEE: ICD-10-CM

## 2018-06-22 DIAGNOSIS — J01.00 ACUTE NON-RECURRENT MAXILLARY SINUSITIS: Primary | ICD-10-CM

## 2018-06-22 DIAGNOSIS — G47.25 JET LAG: ICD-10-CM

## 2018-06-22 PROCEDURE — 99214 OFFICE O/P EST MOD 30 MIN: CPT | Performed by: FAMILY MEDICINE

## 2018-06-22 NOTE — PROGRESS NOTES
HPI:   José Clements is a 65year old female who presents for upper respiratory symptoms, L knee pain, and generalized weakness/fatigue. C/o URI symptoms over the past week.  Returned from a trip to [facility] about 1wk ago, took medications for her symptoms tuberculosis   • Coronary artery disease[other] [OTHER] Father    • TB[other] [OTHER] Father    • Dementia Mother    • Hypertension Mother    • Breast Cancer Sister 36     estimated age   • Coronary artery disease[other] [OTHER] Brother       Smoking statu persist, will check labs    The patient indicates understanding of these issues and agrees to the plan. The patient is asked to return if sx's persist or worsen.

## 2018-08-21 DIAGNOSIS — I10 ESSENTIAL HYPERTENSION: ICD-10-CM

## 2018-08-22 RX ORDER — LISINOPRIL AND HYDROCHLOROTHIAZIDE 12.5; 1 MG/1; MG/1
TABLET ORAL
Qty: 90 TABLET | Refills: 0 | Status: SHIPPED | OUTPATIENT
Start: 2018-08-22 | End: 2018-08-27

## 2018-08-22 NOTE — TELEPHONE ENCOUNTER
LISINOPRIL/HCTZ 10-12.5MG TAB  Will file in chart as: LISINOPRIL-HYDROCHLOROTHIAZIDE 10-12.5 MG Oral Tab  TAKE 1 TABLET BY MOUTH ONCE DAILY       Disp: 90 tablet Refills: 0    Class: Normal Start: 8/21/2018   For: Essential hypertension  Originally ordered

## 2018-08-27 ENCOUNTER — OFFICE VISIT (OUTPATIENT)
Dept: FAMILY MEDICINE CLINIC | Facility: CLINIC | Age: 58
End: 2018-08-27
Payer: COMMERCIAL

## 2018-08-27 ENCOUNTER — TELEPHONE (OUTPATIENT)
Dept: FAMILY MEDICINE CLINIC | Facility: CLINIC | Age: 58
End: 2018-08-27

## 2018-08-27 VITALS
RESPIRATION RATE: 18 BRPM | DIASTOLIC BLOOD PRESSURE: 82 MMHG | BODY MASS INDEX: 33.49 KG/M2 | HEART RATE: 88 BPM | SYSTOLIC BLOOD PRESSURE: 130 MMHG | HEIGHT: 63 IN | TEMPERATURE: 98 F | WEIGHT: 189 LBS | OXYGEN SATURATION: 98 %

## 2018-08-27 DIAGNOSIS — G89.29 CHRONIC PAIN OF BOTH KNEES: ICD-10-CM

## 2018-08-27 DIAGNOSIS — Z00.00 LABORATORY EXAM ORDERED AS PART OF ROUTINE GENERAL MEDICAL EXAMINATION: ICD-10-CM

## 2018-08-27 DIAGNOSIS — R53.1 GENERALIZED WEAKNESS: ICD-10-CM

## 2018-08-27 DIAGNOSIS — M54.16 CHRONIC LUMBAR RADICULOPATHY: ICD-10-CM

## 2018-08-27 DIAGNOSIS — M25.561 CHRONIC PAIN OF BOTH KNEES: ICD-10-CM

## 2018-08-27 DIAGNOSIS — M25.512 ACUTE PAIN OF LEFT SHOULDER: ICD-10-CM

## 2018-08-27 DIAGNOSIS — M25.50 POLYARTHRALGIA: Primary | ICD-10-CM

## 2018-08-27 DIAGNOSIS — K21.9 GASTROESOPHAGEAL REFLUX DISEASE WITHOUT ESOPHAGITIS: ICD-10-CM

## 2018-08-27 DIAGNOSIS — I10 ESSENTIAL HYPERTENSION: ICD-10-CM

## 2018-08-27 DIAGNOSIS — M25.562 CHRONIC PAIN OF BOTH KNEES: ICD-10-CM

## 2018-08-27 PROCEDURE — 99214 OFFICE O/P EST MOD 30 MIN: CPT | Performed by: FAMILY MEDICINE

## 2018-08-27 RX ORDER — MELOXICAM 7.5 MG/1
7.5 TABLET ORAL
Qty: 30 TABLET | Refills: 0 | Status: SHIPPED | OUTPATIENT
Start: 2018-08-27 | End: 2018-08-27

## 2018-08-27 RX ORDER — MELOXICAM 7.5 MG/1
7.5 TABLET ORAL
Qty: 30 TABLET | Refills: 0 | Status: SHIPPED | OUTPATIENT
Start: 2018-08-27 | End: 2019-02-20 | Stop reason: ALTCHOICE

## 2018-08-27 RX ORDER — OMEPRAZOLE 20 MG/1
20 CAPSULE, DELAYED RELEASE ORAL EVERY MORNING
Qty: 90 CAPSULE | Refills: 2 | Status: SHIPPED | OUTPATIENT
Start: 2018-08-27 | End: 2019-01-08

## 2018-08-27 RX ORDER — LISINOPRIL AND HYDROCHLOROTHIAZIDE 12.5; 1 MG/1; MG/1
1 TABLET ORAL
Qty: 90 TABLET | Refills: 1 | Status: SHIPPED | OUTPATIENT
Start: 2018-08-27 | End: 2019-01-08

## 2018-08-27 NOTE — PROGRESS NOTES
HPI:    Patient ID: Jeri Dawkins is a 65year old female. HPI   65yr old female presents for f/u on HTN, GERD, c/o joint pains, LBP and generalized weakness. HTN, stable. Requesting refill for medication. No SEs noted.   GERD, well controlled with om oriented to person, place, and time. She appears well-developed and well-nourished. HENT:   Head: Normocephalic and atraumatic. Mouth/Throat: Oropharynx is clear and moist.   Neck: Neck supple. Cardiovascular: Normal rate and regular rhythm.     Pulmo hypertension  - bp stable  - cpm  - refills provided    7.  Gastroesophageal reflux disease without esophagitis  - cont GERD diet, avoid late night meals, elevate HOB and monitor  - cont omeprazole daily  - monitor  - omeprazole 20 MG Oral Capsule Delayed R

## 2018-08-27 NOTE — TELEPHONE ENCOUNTER
Pharmacy informed. Meloxicam 7.5 MG Oral Tab 30 tablet 0 8/27/2018    Sig :  Take 1 tablet (7.5 mg total) by mouth daily with dinner.  As needed for pain

## 2018-08-27 NOTE — TELEPHONE ENCOUNTER
Pharmacy called to find out which directions are correct for meloxicam, said they have 1 by mouth daily with breakfast, and 1 by mouth with dinner as needed for pain, need clarification.

## 2018-09-06 ENCOUNTER — LAB ENCOUNTER (OUTPATIENT)
Dept: LAB | Age: 58
End: 2018-09-06
Attending: FAMILY MEDICINE
Payer: COMMERCIAL

## 2018-09-06 DIAGNOSIS — M54.16 CHRONIC LUMBAR RADICULOPATHY: ICD-10-CM

## 2018-09-06 DIAGNOSIS — G89.29 CHRONIC PAIN OF BOTH KNEES: ICD-10-CM

## 2018-09-06 DIAGNOSIS — R53.1 GENERALIZED WEAKNESS: ICD-10-CM

## 2018-09-06 DIAGNOSIS — M25.562 CHRONIC PAIN OF BOTH KNEES: ICD-10-CM

## 2018-09-06 DIAGNOSIS — I10 ESSENTIAL HYPERTENSION: ICD-10-CM

## 2018-09-06 DIAGNOSIS — M25.512 ACUTE PAIN OF LEFT SHOULDER: ICD-10-CM

## 2018-09-06 DIAGNOSIS — M25.50 POLYARTHRALGIA: ICD-10-CM

## 2018-09-06 DIAGNOSIS — M25.561 CHRONIC PAIN OF BOTH KNEES: ICD-10-CM

## 2018-09-06 DIAGNOSIS — Z00.00 LABORATORY EXAM ORDERED AS PART OF ROUTINE GENERAL MEDICAL EXAMINATION: ICD-10-CM

## 2018-09-06 LAB
ALBUMIN SERPL-MCNC: 3.9 G/DL (ref 3.5–4.8)
ALBUMIN/GLOB SERPL: 0.9 {RATIO} (ref 1–2)
ALP LIVER SERPL-CCNC: 88 U/L (ref 46–118)
ALT SERPL-CCNC: 24 U/L (ref 14–54)
ANION GAP SERPL CALC-SCNC: 7 MMOL/L (ref 0–18)
AST SERPL-CCNC: 23 U/L (ref 15–41)
BASOPHILS # BLD AUTO: 0.05 X10(3) UL (ref 0–0.1)
BASOPHILS NFR BLD AUTO: 0.6 %
BILIRUB SERPL-MCNC: 0.6 MG/DL (ref 0.1–2)
BUN BLD-MCNC: 16 MG/DL (ref 8–20)
BUN/CREAT SERPL: 20 (ref 10–20)
CALCIUM BLD-MCNC: 9.7 MG/DL (ref 8.3–10.3)
CHLORIDE SERPL-SCNC: 106 MMOL/L (ref 101–111)
CHOLEST SMN-MCNC: 253 MG/DL (ref ?–200)
CO2 SERPL-SCNC: 29 MMOL/L (ref 22–32)
CREAT BLD-MCNC: 0.8 MG/DL (ref 0.55–1.02)
CRP SERPL-MCNC: 1.71 MG/DL (ref ?–1)
EOSINOPHIL # BLD AUTO: 0.31 X10(3) UL (ref 0–0.3)
EOSINOPHIL NFR BLD AUTO: 3.6 %
ERYTHROCYTE [DISTWIDTH] IN BLOOD BY AUTOMATED COUNT: 14.7 % (ref 11.5–16)
EST. AVERAGE GLUCOSE BLD GHB EST-MCNC: 131 MG/DL (ref 68–126)
GLOBULIN PLAS-MCNC: 4.5 G/DL (ref 2.5–4)
GLUCOSE BLD-MCNC: 106 MG/DL (ref 70–99)
HAV AB SERPL IA-ACNC: 1432 PG/ML (ref 193–986)
HBA1C MFR BLD HPLC: 6.2 % (ref ?–5.7)
HCT VFR BLD AUTO: 41.2 % (ref 34–50)
HDLC SERPL-MCNC: 81 MG/DL (ref 40–59)
HGB BLD-MCNC: 12.6 G/DL (ref 12–16)
IMMATURE GRANULOCYTE COUNT: 0.02 X10(3) UL (ref 0–1)
IMMATURE GRANULOCYTE RATIO %: 0.2 %
LDLC SERPL CALC-MCNC: 140 MG/DL (ref ?–100)
LYMPHOCYTES # BLD AUTO: 3.1 X10(3) UL (ref 0.9–4)
LYMPHOCYTES NFR BLD AUTO: 36 %
M PROTEIN MFR SERPL ELPH: 8.4 G/DL (ref 6.1–8.3)
MCH RBC QN AUTO: 27.2 PG (ref 27–33.2)
MCHC RBC AUTO-ENTMCNC: 30.6 G/DL (ref 31–37)
MCV RBC AUTO: 88.8 FL (ref 81–100)
MONOCYTES # BLD AUTO: 0.57 X10(3) UL (ref 0.1–1)
MONOCYTES NFR BLD AUTO: 6.6 %
NEUTROPHIL ABS PRELIM: 4.55 X10 (3) UL (ref 1.3–6.7)
NEUTROPHILS # BLD AUTO: 4.55 X10(3) UL (ref 1.3–6.7)
NEUTROPHILS NFR BLD AUTO: 53 %
NONHDLC SERPL-MCNC: 172 MG/DL (ref ?–130)
OSMOLALITY SERPL CALC.SUM OF ELEC: 296 MOSM/KG (ref 275–295)
PLATELET # BLD AUTO: 288 10(3)UL (ref 150–450)
POTASSIUM SERPL-SCNC: 4.2 MMOL/L (ref 3.6–5.1)
RBC # BLD AUTO: 4.64 X10(6)UL (ref 3.8–5.1)
RED CELL DISTRIBUTION WIDTH-SD: 47.3 FL (ref 35.1–46.3)
RHEUMATOID FACT SERPL-ACNC: <10 IU/ML (ref ?–15)
SED RATE-ML: 32 MM/HR (ref 0–25)
SODIUM SERPL-SCNC: 142 MMOL/L (ref 136–144)
TRIGL SERPL-MCNC: 159 MG/DL (ref 30–149)
TSI SER-ACNC: 1.64 MIU/ML (ref 0.35–5.5)
URATE SERPL-MCNC: 5.2 MG/DL (ref 2.4–8)
VIT D+METAB SERPL-MCNC: 15.6 NG/ML (ref 30–100)
VLDLC SERPL CALC-MCNC: 32 MG/DL (ref 0–30)
WBC # BLD AUTO: 8.6 X10(3) UL (ref 4–13)

## 2018-09-06 PROCEDURE — 84443 ASSAY THYROID STIM HORMONE: CPT

## 2018-09-06 PROCEDURE — 86038 ANTINUCLEAR ANTIBODIES: CPT

## 2018-09-06 PROCEDURE — 80061 LIPID PANEL: CPT

## 2018-09-06 PROCEDURE — 86200 CCP ANTIBODY: CPT

## 2018-09-06 PROCEDURE — 84550 ASSAY OF BLOOD/URIC ACID: CPT

## 2018-09-06 PROCEDURE — 86431 RHEUMATOID FACTOR QUANT: CPT

## 2018-09-06 PROCEDURE — 80053 COMPREHEN METABOLIC PANEL: CPT

## 2018-09-06 PROCEDURE — 82607 VITAMIN B-12: CPT

## 2018-09-06 PROCEDURE — 85025 COMPLETE CBC W/AUTO DIFF WBC: CPT

## 2018-09-06 PROCEDURE — 86140 C-REACTIVE PROTEIN: CPT

## 2018-09-06 PROCEDURE — 82306 VITAMIN D 25 HYDROXY: CPT

## 2018-09-06 PROCEDURE — 85652 RBC SED RATE AUTOMATED: CPT

## 2018-09-06 PROCEDURE — 83036 HEMOGLOBIN GLYCOSYLATED A1C: CPT

## 2018-09-08 LAB — CYCLIC CITRULLINATED PEPTIDE: 4 UNITS

## 2018-09-09 LAB — ANA SCREEN: NEGATIVE

## 2018-09-19 ENCOUNTER — OFFICE VISIT (OUTPATIENT)
Dept: FAMILY MEDICINE CLINIC | Facility: CLINIC | Age: 58
End: 2018-09-19
Payer: COMMERCIAL

## 2018-09-19 VITALS
DIASTOLIC BLOOD PRESSURE: 80 MMHG | HEART RATE: 87 BPM | RESPIRATION RATE: 18 BRPM | WEIGHT: 186 LBS | HEIGHT: 63 IN | BODY MASS INDEX: 32.96 KG/M2 | SYSTOLIC BLOOD PRESSURE: 126 MMHG | OXYGEN SATURATION: 98 %

## 2018-09-19 DIAGNOSIS — R30.0 DYSURIA: ICD-10-CM

## 2018-09-19 DIAGNOSIS — R73.01 IFG (IMPAIRED FASTING GLUCOSE): ICD-10-CM

## 2018-09-19 DIAGNOSIS — K21.9 GASTROESOPHAGEAL REFLUX DISEASE WITHOUT ESOPHAGITIS: ICD-10-CM

## 2018-09-19 DIAGNOSIS — Z51.81 ENCOUNTER FOR MEDICATION MONITORING: ICD-10-CM

## 2018-09-19 DIAGNOSIS — F41.9 ANXIETY: ICD-10-CM

## 2018-09-19 DIAGNOSIS — E55.9 VITAMIN D DEFICIENCY: ICD-10-CM

## 2018-09-19 DIAGNOSIS — M25.50 POLYARTHRALGIA: ICD-10-CM

## 2018-09-19 DIAGNOSIS — M17.12 PRIMARY OSTEOARTHRITIS OF LEFT KNEE: ICD-10-CM

## 2018-09-19 DIAGNOSIS — G43.009 MIGRAINE WITHOUT AURA AND WITHOUT STATUS MIGRAINOSUS, NOT INTRACTABLE: ICD-10-CM

## 2018-09-19 DIAGNOSIS — E78.2 MIXED HYPERLIPIDEMIA: ICD-10-CM

## 2018-09-19 DIAGNOSIS — M77.8 LEFT SHOULDER TENDINITIS: ICD-10-CM

## 2018-09-19 DIAGNOSIS — J30.1 NON-SEASONAL ALLERGIC RHINITIS DUE TO POLLEN: ICD-10-CM

## 2018-09-19 DIAGNOSIS — R35.0 URINARY FREQUENCY: Primary | ICD-10-CM

## 2018-09-19 DIAGNOSIS — I10 ESSENTIAL HYPERTENSION: ICD-10-CM

## 2018-09-19 LAB
MULTISTIX LOT#: NORMAL NUMERIC
PH, URINE: 5 (ref 4.5–8)
SPECIFIC GRAVITY: 1.03 (ref 1–1.03)
URINE-COLOR: YELLOW
UROBILINOGEN,SEMI-QN: 0.2 MG/DL (ref 0–1.9)

## 2018-09-19 PROCEDURE — 81003 URINALYSIS AUTO W/O SCOPE: CPT | Performed by: FAMILY MEDICINE

## 2018-09-19 PROCEDURE — 87086 URINE CULTURE/COLONY COUNT: CPT | Performed by: FAMILY MEDICINE

## 2018-09-19 PROCEDURE — 99215 OFFICE O/P EST HI 40 MIN: CPT | Performed by: FAMILY MEDICINE

## 2018-09-19 PROCEDURE — 81001 URINALYSIS AUTO W/SCOPE: CPT | Performed by: FAMILY MEDICINE

## 2018-09-19 RX ORDER — LORATADINE 10 MG/1
10 TABLET ORAL DAILY
COMMUNITY
End: 2020-08-12

## 2018-09-19 RX ORDER — ERGOCALCIFEROL 1.25 MG/1
50000 CAPSULE ORAL WEEKLY
Qty: 12 CAPSULE | Refills: 0 | Status: SHIPPED | OUTPATIENT
Start: 2018-09-19 | End: 2018-10-19

## 2018-09-19 RX ORDER — METFORMIN HYDROCHLORIDE 500 MG/1
500 TABLET, EXTENDED RELEASE ORAL DAILY
Qty: 30 TABLET | Refills: 2 | Status: SHIPPED | OUTPATIENT
Start: 2018-09-19 | End: 2019-01-08

## 2018-09-19 NOTE — PROGRESS NOTES
Dottie Mata is a 65year old female.   HPI:  Pt is here today with multiple issues and to review her medications  Brings in med bottles with her  For one week notes increased urinary frequency and some hesitancy at times  Also with intermittent dysuria total) by mouth daily with dinner.  As needed for pain Disp: 30 tablet Rfl: 0         Past Medical History:   Diagnosis Date   • Anxiety    • Esophageal reflux    • Hyperlipidemia    • Lumbago    • Meningioma (HCC)    • Migraines    • Osteoarthritis    • Sp versus right. Full range of motion with pain with extremes of flexion and extension. Left shoulder with anterior and superior joint line tenderness. Limited abduction elevation.   Neurovascular exam intact  NEURO: A&O x3, no focal deficits  Normal gait meals.  - Aspirin-Acetaminophen-Caffeine (EXCEDRIN MIGRAINE OR); Take by mouth daily as needed. Monitor. Current headaches likely due to migraine and sinus components. Call or follow-up if symptoms persist or worsen. Keep h/a diary    9.  Anxiety  Patie

## 2018-09-20 LAB
BILIRUB UR QL STRIP.AUTO: NEGATIVE
COLOR UR AUTO: YELLOW
GLUCOSE UR STRIP.AUTO-MCNC: NEGATIVE MG/DL
KETONES UR STRIP.AUTO-MCNC: NEGATIVE MG/DL
LEUKOCYTE ESTERASE UR QL STRIP.AUTO: NEGATIVE
NITRITE UR QL STRIP.AUTO: NEGATIVE
PH UR STRIP.AUTO: 5 [PH] (ref 4.5–8)
PROT UR STRIP.AUTO-MCNC: NEGATIVE MG/DL
SP GR UR STRIP.AUTO: 1.02 (ref 1–1.03)
UROBILINOGEN UR STRIP.AUTO-MCNC: <2 MG/DL

## 2018-09-26 ENCOUNTER — OFFICE VISIT (OUTPATIENT)
Dept: PHYSICAL THERAPY | Age: 58
End: 2018-09-26
Attending: FAMILY MEDICINE
Payer: COMMERCIAL

## 2018-09-26 DIAGNOSIS — M77.8 LEFT SHOULDER TENDINITIS: ICD-10-CM

## 2018-09-26 PROCEDURE — 97140 MANUAL THERAPY 1/> REGIONS: CPT

## 2018-09-26 PROCEDURE — 97162 PT EVAL MOD COMPLEX 30 MIN: CPT

## 2018-09-26 PROCEDURE — 97110 THERAPEUTIC EXERCISES: CPT

## 2018-09-26 NOTE — PROGRESS NOTES
UPPER EXTREMITY EVALUATION:   Referring Physician: Dr. Kaylyn Rebolledo  Diagnosis: L shoulder tendinitis     Date of Service: 9/26/2018     PATIENT John Paul Smart is a 65year old y/o female who presents to therapy today with complaints of L shoulder pa light touch ERIC UE    A/PROM: WNL ERIC elbow and wrist; empty end feel L with PROM -pain with wincing and guarding   Shoulder    Flexion: R 147; L 78*/90*  Abduction: R 130; L 60*/90*  ER: R T3; L lateral to T1*/60*  IR: R T8; L Lateral to L3*/60*     Acces progress achieved in PT (10 visits)    Frequency / Duration: Patient will be seen for 2 x/week or a total of 10 visits over a 90 day period. Treatment will include: Manual Therapy; Therapeutic Exercises; Neuromuscular Re-education;  Therapeutic Activity; Pt

## 2018-10-03 ENCOUNTER — OFFICE VISIT (OUTPATIENT)
Dept: PHYSICAL THERAPY | Age: 58
End: 2018-10-03
Attending: FAMILY MEDICINE
Payer: COMMERCIAL

## 2018-10-03 DIAGNOSIS — M77.8 LEFT SHOULDER TENDINITIS: ICD-10-CM

## 2018-10-03 PROCEDURE — 97110 THERAPEUTIC EXERCISES: CPT

## 2018-10-03 PROCEDURE — 97140 MANUAL THERAPY 1/> REGIONS: CPT

## 2018-10-03 NOTE — PROGRESS NOTES
Dx: L shoulder tendinitis         Authorized # of Visits:  PPO (POC 10 visits)         Next MD visit: none scheduled  Fall Risk: standard         Precautions: n/a             Subjective: Felt sore after eval. Did HEP; had some pain during but was able to d manual  -self* x10 ea         supine AAROM wand flex and ABD*, ER x8 ea (~50% range)         Seated AAROM towel slide on table  -flex x12 (better; still ~80 deg)  -abd x6 (pain)         scap retractions* x10         pec strech on foam beam x30s (cannot)

## 2018-10-04 ENCOUNTER — OFFICE VISIT (OUTPATIENT)
Dept: PHYSICAL THERAPY | Age: 58
End: 2018-10-04
Attending: FAMILY MEDICINE
Payer: COMMERCIAL

## 2018-10-04 PROCEDURE — 97140 MANUAL THERAPY 1/> REGIONS: CPT

## 2018-10-04 PROCEDURE — 97110 THERAPEUTIC EXERCISES: CPT

## 2018-10-04 NOTE — PROGRESS NOTES
Dx: L shoulder tendinitis         Authorized # of Visits:  PPO (POC 10 visits)         Next MD visit: none scheduled  Fall Risk: standard         Precautions: n/a             Subjective: Shoulder was sore after PT, but a little better sleeping last night. gapping x3 min         supine AAROM wand flex and ABD*, ER x8 ea (~50% range) Standing AAROM flex SB on wall x8*        Seated AAROM towel slide on table  -flex x12 (better; still ~80 deg)  -abd x6 (pain) sidelying AAROM  -flex 2x8 (~100 Deg)  -abd 2x8 (~9

## 2018-10-10 ENCOUNTER — OFFICE VISIT (OUTPATIENT)
Dept: PHYSICAL THERAPY | Age: 58
End: 2018-10-10
Attending: FAMILY MEDICINE
Payer: COMMERCIAL

## 2018-10-10 PROCEDURE — 97110 THERAPEUTIC EXERCISES: CPT

## 2018-10-10 PROCEDURE — 97140 MANUAL THERAPY 1/> REGIONS: CPT

## 2018-10-10 NOTE — PROGRESS NOTES
Dx: L shoulder tendinitis         Authorized # of Visits:  PPO (POC 10 visits)         Next MD visit: none scheduled  Fall Risk: standard         Precautions: n/a             Subjective: Sleeping ok the last 2 days; still some pain.  Overall though today pa median N sliders ERIC* x2 min manual  -self* x10 ea R cervical translation and L rotation glides Gr II for L gapping x3 min  R cervical translation and L rotation glides Gr II for L gapping x3 min        supine AAROM wand flex and ABD*, ER x8 ea (~50% ran

## 2018-10-11 ENCOUNTER — APPOINTMENT (OUTPATIENT)
Dept: PHYSICAL THERAPY | Age: 58
End: 2018-10-11
Attending: FAMILY MEDICINE
Payer: COMMERCIAL

## 2018-10-15 ENCOUNTER — OFFICE VISIT (OUTPATIENT)
Dept: PHYSICAL THERAPY | Age: 58
End: 2018-10-15
Attending: FAMILY MEDICINE
Payer: COMMERCIAL

## 2018-10-15 PROCEDURE — 97140 MANUAL THERAPY 1/> REGIONS: CPT

## 2018-10-15 PROCEDURE — 97110 THERAPEUTIC EXERCISES: CPT

## 2018-10-15 NOTE — PROGRESS NOTES
Dx: L shoulder tendinitis         Authorized # of Visits:  PPO (POC 10 visits)         Next MD visit: none scheduled  Fall Risk: standard         Precautions: n/a             Subjective: Feeling better; had to cancel last week because she was sick.  Shoulde ea  *HEP LS UT and LS stretch manual L 2x10s ea YTB  -ERIC ER x8  -rows x10  -horiz ABD x8 (sore)      PROM L shld all planes x4 min (fair) Manual cervical distraction 3x30s   Manual cervical distraction 3x30s  3x30s      median N sliders ERIC* x2 min manual

## 2018-10-22 ENCOUNTER — OFFICE VISIT (OUTPATIENT)
Dept: PHYSICAL THERAPY | Age: 58
End: 2018-10-22
Attending: FAMILY MEDICINE
Payer: COMMERCIAL

## 2018-10-22 PROCEDURE — 97140 MANUAL THERAPY 1/> REGIONS: CPT

## 2018-10-22 PROCEDURE — 97110 THERAPEUTIC EXERCISES: CPT

## 2018-10-22 NOTE — PROGRESS NOTES
Dx: L shoulder tendinitis         Authorized # of Visits:  PPO (POC 10 visits)         Next MD visit: none scheduled  Fall Risk: standard         Precautions: n/a             Subjective: Missed last week because she had a schedule conflict.  Pain is 2/10 to and LS stretch manual L 3x10s ea  *HEP LS UT and LS stretch manual L 2x10s ea YTB  -ERIC ER x8  -rows x10  -horiz ABD x8 (sore) YTB   -ext x10  -rows x10   -horiz abd x8     PROM L shld all planes x4 min (fair) Manual cervical distraction 3x30s   Manual cer strengthening and resumed scapular mobilizations for improved pec flexibility and decreased anterior impingement. Charges:  Man x1, Therex x2      Total Timed Treatment: 43 min  Total Treatment Time: 43 min

## 2018-10-24 ENCOUNTER — OFFICE VISIT (OUTPATIENT)
Dept: PHYSICAL THERAPY | Age: 58
End: 2018-10-24
Attending: FAMILY MEDICINE
Payer: COMMERCIAL

## 2018-10-24 PROCEDURE — 97140 MANUAL THERAPY 1/> REGIONS: CPT

## 2018-10-24 PROCEDURE — 97110 THERAPEUTIC EXERCISES: CPT

## 2018-10-24 NOTE — PROGRESS NOTES
Dx: L shoulder tendinitis         Authorized # of Visits:  PPO (POC 10 visits)         Next MD visit: none scheduled  Fall Risk: standard         Precautions: n/a             Subjective: Sleep is still not good.  Frankey Adams asleep on L side last night, so arm is visits) -progress    Plan: continue STM prior to ROM training to help improve tolerance and functional range to avoid frozen shoulder  Date: 10/3/2018 TX#: 2/10 Date:10/4/2018 TX#: 3/10 Date: 10/10/2018 TX#: 4/10 Date:10/15/2018 TX#: 5/10 Date: 10/22/2018 CW/CCW pain x8 ea - x10 ea x10 ea  YTB   -IR x8 L (pain)  -ER x8 L (pain)    sidelying L scapular mobs Gr II  x2 min x1 min - BTB pull downs   -flex x10 (sore)  -ABD x10 (sore) BTB pull downs   -flex 2x10  -ABD 2x10 -    STM L RTC, UT, LS, biceps tendon in

## 2018-10-31 ENCOUNTER — OFFICE VISIT (OUTPATIENT)
Dept: PHYSICAL THERAPY | Age: 58
End: 2018-10-31
Attending: FAMILY MEDICINE
Payer: COMMERCIAL

## 2018-10-31 PROCEDURE — 97110 THERAPEUTIC EXERCISES: CPT

## 2018-10-31 PROCEDURE — 97140 MANUAL THERAPY 1/> REGIONS: CPT

## 2018-10-31 NOTE — PROGRESS NOTES
Dx: L shoulder tendinitis         Authorized # of Visits:  PPO (POC 10 visits)         Next MD visit: none scheduled  Fall Risk: standard         Precautions: n/a             Subjective: Felt better after last session and sleep was better last night.  Still min  -abd x1 min pullies   -flex x2 min  -abd x1 min TRX AAROM flex x10 (improved)    L biceps STM x4 min UT and LS stretch manual L 3x10s ea  *HEP LS UT and LS stretch manual L 2x10s ea YTB  -ERIC ER x8  -rows x10  -horiz ABD x8 (sore) YTB   -ext x10  -row 2x10  -ABD 2x10 - Robbery 2x10    STM L RTC, UT, LS, biceps tendon in sidelying x6 min x6 min between AAROM  x5 min L RTC and biceps tendon in supine; x5 min pec STM seated  STM L pec and bicep x5 min in supine; x5 min pec STM seated  STM L pec and bicep x

## 2018-11-01 ENCOUNTER — APPOINTMENT (OUTPATIENT)
Dept: PHYSICAL THERAPY | Age: 58
End: 2018-11-01
Attending: FAMILY MEDICINE
Payer: COMMERCIAL

## 2018-11-07 ENCOUNTER — TELEPHONE (OUTPATIENT)
Dept: PHYSICAL THERAPY | Age: 58
End: 2018-11-07

## 2018-11-08 ENCOUNTER — TELEPHONE (OUTPATIENT)
Dept: PHYSICAL THERAPY | Age: 58
End: 2018-11-08

## 2018-11-08 NOTE — TELEPHONE ENCOUNTER
No show. Called to inform pt that this was her 3rd No Show appt. Rest of her appointments will be cancelled due to attendance policy. Left message with .

## 2018-11-14 ENCOUNTER — APPOINTMENT (OUTPATIENT)
Dept: PHYSICAL THERAPY | Age: 58
End: 2018-11-14
Attending: FAMILY MEDICINE
Payer: COMMERCIAL

## 2018-11-14 ENCOUNTER — OFFICE VISIT (OUTPATIENT)
Dept: PHYSICAL THERAPY | Age: 58
End: 2018-11-14
Attending: FAMILY MEDICINE
Payer: COMMERCIAL

## 2018-11-14 PROCEDURE — 97110 THERAPEUTIC EXERCISES: CPT

## 2018-11-14 PROCEDURE — 97140 MANUAL THERAPY 1/> REGIONS: CPT

## 2018-11-14 NOTE — PROGRESS NOTES
Dx: L shoulder tendinitis         Authorized # of Visits:  PPO (POC 10 visits)         Next MD visit: none scheduled  Fall Risk: standard         Precautions: n/a             Subjective: Reaching up and back is much better; now able to don her bra in back. Date:10/15/2018 TX#: 5/10 Date: 10/22/2018 TX#: 6/10 Date: 10/24/2018   TX#: 7/10 Date: 10/31/2018 TX#: 8/10 Date: 11/14/2018  Tx: 9/10   - - - - - - UT and LS stretch manual L 2x10s ea 2x10s ea   UBE -cannot tolerate pullies  - flex x1 min (~130)  -abd x1 with passive pec stretching x3 min L x3 min L x3 min L x3 min L   pec strech on foam beam x30s (cannot) Manual pec stretch in sidelying 3x20s Manual pec stretch seated 3x30s Corner pec stretch 3x30s  3x30s 3x30s 3x30s scapular retraction with sidelying ER

## 2018-11-15 ENCOUNTER — OFFICE VISIT (OUTPATIENT)
Dept: PHYSICAL THERAPY | Age: 58
End: 2018-11-15
Attending: FAMILY MEDICINE
Payer: COMMERCIAL

## 2018-11-15 ENCOUNTER — APPOINTMENT (OUTPATIENT)
Dept: PHYSICAL THERAPY | Age: 58
End: 2018-11-15
Attending: FAMILY MEDICINE
Payer: COMMERCIAL

## 2018-11-15 PROCEDURE — 97140 MANUAL THERAPY 1/> REGIONS: CPT

## 2018-11-15 PROCEDURE — 97110 THERAPEUTIC EXERCISES: CPT

## 2018-11-15 NOTE — PROGRESS NOTES
Discharge Summary  Pt has attended 10 visits in Physical Therapy.      Dx: L shoulder tendinitis         Authorized # of Visits:  PPO (POC 10 visits)         Next MD visit: none scheduled  Fall Risk: standard         Precautions: n/a             Subjective -progress  · Pt will improve shoulder strength throughout to 4/5 to ability to carry laundry basket (10 visits) -progress  · Pt will be independent and compliant with comprehensive HEP to maintain progress achieved in PT (10 visits) -MET    Plan: D/C with min x3 min  x3min   supine AAROM wand flex and ABD*, ER x8 ea (~50% range) Standing AAROM flex SB on wall x8* - Standing AAROM flex small ball on wall x8 sidelying ER L 2x10 sidelying AAROM ER 2x10  -abd 2x8 (90 deg)  -wall slide ABD x8 Seated 1# bar AAROM shoulder - -   Skilled Services: finalized and reviewed HEP* - supine austin GUZMAN flexion & ABD, seated austin GUZMAN ER; reyna GUZMAN IR; YTB rows; corner pec stretch; median N glides    Charges:  Man x2, Therex x1      Total Timed Treatment: 45 min  Total Torri

## 2019-01-08 ENCOUNTER — OFFICE VISIT (OUTPATIENT)
Dept: FAMILY MEDICINE CLINIC | Facility: CLINIC | Age: 59
End: 2019-01-08
Payer: COMMERCIAL

## 2019-01-08 VITALS
HEART RATE: 91 BPM | RESPIRATION RATE: 16 BRPM | SYSTOLIC BLOOD PRESSURE: 138 MMHG | TEMPERATURE: 98 F | DIASTOLIC BLOOD PRESSURE: 84 MMHG | BODY MASS INDEX: 33.04 KG/M2 | WEIGHT: 186.5 LBS | OXYGEN SATURATION: 97 % | HEIGHT: 63 IN

## 2019-01-08 DIAGNOSIS — R73.01 IFG (IMPAIRED FASTING GLUCOSE): ICD-10-CM

## 2019-01-08 DIAGNOSIS — G47.30 SLEEP-DISORDERED BREATHING: ICD-10-CM

## 2019-01-08 DIAGNOSIS — M54.12 CERVICAL RADICULOPATHY: Primary | ICD-10-CM

## 2019-01-08 DIAGNOSIS — R06.83 SNORING: ICD-10-CM

## 2019-01-08 DIAGNOSIS — J30.89 NON-SEASONAL ALLERGIC RHINITIS DUE TO OTHER ALLERGIC TRIGGER: ICD-10-CM

## 2019-01-08 DIAGNOSIS — R40.0 DAYTIME SOMNOLENCE: ICD-10-CM

## 2019-01-08 DIAGNOSIS — K21.9 GASTROESOPHAGEAL REFLUX DISEASE WITHOUT ESOPHAGITIS: ICD-10-CM

## 2019-01-08 DIAGNOSIS — M77.8 LEFT SHOULDER TENDINITIS: ICD-10-CM

## 2019-01-08 DIAGNOSIS — I10 ESSENTIAL HYPERTENSION: ICD-10-CM

## 2019-01-08 PROCEDURE — 99214 OFFICE O/P EST MOD 30 MIN: CPT | Performed by: FAMILY MEDICINE

## 2019-01-08 RX ORDER — RANITIDINE 150 MG/1
150 TABLET ORAL NIGHTLY
Qty: 30 TABLET | Refills: 3 | Status: SHIPPED | OUTPATIENT
Start: 2019-01-08 | End: 2020-02-19

## 2019-01-08 RX ORDER — OMEPRAZOLE 20 MG/1
20 CAPSULE, DELAYED RELEASE ORAL EVERY MORNING
Qty: 90 CAPSULE | Refills: 2 | Status: SHIPPED | OUTPATIENT
Start: 2019-01-08 | End: 2020-02-19

## 2019-01-08 RX ORDER — PREDNISONE 20 MG/1
TABLET ORAL
Qty: 5 TABLET | Refills: 0 | Status: SHIPPED | OUTPATIENT
Start: 2019-01-08 | End: 2019-02-20 | Stop reason: ALTCHOICE

## 2019-01-08 RX ORDER — LISINOPRIL AND HYDROCHLOROTHIAZIDE 12.5; 1 MG/1; MG/1
1 TABLET ORAL
Qty: 90 TABLET | Refills: 2 | Status: SHIPPED | OUTPATIENT
Start: 2019-01-08 | End: 2019-07-30

## 2019-01-08 RX ORDER — METFORMIN HYDROCHLORIDE 500 MG/1
500 TABLET, EXTENDED RELEASE ORAL DAILY
Qty: 90 TABLET | Refills: 1 | Status: SHIPPED | OUTPATIENT
Start: 2019-01-08 | End: 2020-03-01

## 2019-01-08 RX ORDER — CYCLOBENZAPRINE HCL 5 MG
TABLET ORAL
Qty: 30 TABLET | Refills: 0 | Status: SHIPPED | OUTPATIENT
Start: 2019-01-08 | End: 2019-02-20 | Stop reason: ALTCHOICE

## 2019-01-08 NOTE — PROGRESS NOTES
Beatris Sotelo is a 63year old female.   HPI:  Pt is here today with multiple complaints  Pain in R lateral neck for about one month, started while was away in [place]  Did local massage tx and not much help  Pain radiates down RUE and gets some numbness predniSONE 20 MG Oral Tab 1 po qdaily for 5 days as directed, take with food Disp: 5 tablet Rfl: 0   loratadine 10 MG Oral Tab Take 10 mg by mouth daily. Disp:  Rfl:    Aspirin-Acetaminophen-Caffeine (EXCEDRIN MIGRAINE OR) Take by mouth daily as needed. oz    GENERAL: well developed, well nourished,in no apparent distress, pleasant affect  SKIN: no rashes,no suspicious lesions  HEENT: atraumatic, normocephalic,  Conj clear, EOMI, PERRL  TMs: chronic changes with old per R and some scarring on L, no erythe eating. Weight loss will help. - omeprazole 20 MG Oral Capsule Delayed Release; Take 1 capsule   (20 mg total) by mouth every morning. Dispense: 90 capsule; Refill: 2   will add ranitidine at at bedtime.  - RaNITidine HCl 150 MG Oral Tab;  Take 1 tablet

## 2019-01-21 ENCOUNTER — TELEPHONE (OUTPATIENT)
Dept: FAMILY MEDICINE CLINIC | Facility: CLINIC | Age: 59
End: 2019-01-21

## 2019-01-21 DIAGNOSIS — R79.82 ELEVATED C-REACTIVE PROTEIN (CRP): ICD-10-CM

## 2019-01-21 DIAGNOSIS — R06.83 SNORING: ICD-10-CM

## 2019-01-21 DIAGNOSIS — E55.9 VITAMIN D DEFICIENCY: ICD-10-CM

## 2019-01-21 DIAGNOSIS — G47.30 SLEEP-DISORDERED BREATHING: ICD-10-CM

## 2019-01-21 DIAGNOSIS — R40.0 DAYTIME SOMNOLENCE: Primary | ICD-10-CM

## 2019-01-21 DIAGNOSIS — E78.49 OTHER HYPERLIPIDEMIA: ICD-10-CM

## 2019-01-21 DIAGNOSIS — R73.01 IFG (IMPAIRED FASTING GLUCOSE): ICD-10-CM

## 2019-01-21 DIAGNOSIS — R70.0 ELEVATED SED RATE: ICD-10-CM

## 2019-01-21 DIAGNOSIS — I10 ESSENTIAL HYPERTENSION: ICD-10-CM

## 2019-01-21 NOTE — TELEPHONE ENCOUNTER
Dr. Marta Meigs,  Please advise    Patient's  left a VMM stating the insurance denied the order for OP Sleep Study. They advise a Home Sleep Study.     Order pending

## 2019-01-21 NOTE — TELEPHONE ENCOUNTER
PANCHO Lewis 68 and verified process for arranging Home Sleep Study.   They said patient is to call Sleep Center at [phone] to schedule test.  Patient will be given all instructions at that time and they will come to the Sleep Center to  [facility]

## 2019-01-21 NOTE — TELEPHONE ENCOUNTER
Order for home sleep study signed.  I have not ordered this through Epic previously, so please make sure orders are handled appropriately so pt gets study done soon  Also, pls notify pt or spouse that orders for fasting labs have been entered for pt to get

## 2019-01-22 NOTE — TELEPHONE ENCOUNTER
Please change the name of contact number [phone] to , Jah Bailey, cell phone number NOT Home phone number.     Thanks

## 2019-01-22 NOTE — TELEPHONE ENCOUNTER
Spoke to patient's  who states he did not receive VMM yesterday.   Reviewed instructions to call [facility] at [phone] to schedule test and get the detailed instructions on how to do test.  He is concerned his wife will not be able to d

## 2019-02-02 ENCOUNTER — OFFICE VISIT (OUTPATIENT)
Dept: SLEEP CENTER | Facility: HOSPITAL | Age: 59
End: 2019-02-02
Attending: FAMILY MEDICINE
Payer: COMMERCIAL

## 2019-02-02 ENCOUNTER — LAB ENCOUNTER (OUTPATIENT)
Dept: LAB | Age: 59
End: 2019-02-02
Attending: FAMILY MEDICINE
Payer: COMMERCIAL

## 2019-02-02 DIAGNOSIS — R40.0 DAYTIME SOMNOLENCE: ICD-10-CM

## 2019-02-02 DIAGNOSIS — R79.82 ELEVATED C-REACTIVE PROTEIN (CRP): ICD-10-CM

## 2019-02-02 DIAGNOSIS — E78.49 OTHER HYPERLIPIDEMIA: ICD-10-CM

## 2019-02-02 DIAGNOSIS — E55.9 VITAMIN D DEFICIENCY: ICD-10-CM

## 2019-02-02 DIAGNOSIS — R70.0 ELEVATED SED RATE: ICD-10-CM

## 2019-02-02 DIAGNOSIS — R73.01 IFG (IMPAIRED FASTING GLUCOSE): ICD-10-CM

## 2019-02-02 DIAGNOSIS — I10 ESSENTIAL HYPERTENSION: ICD-10-CM

## 2019-02-02 LAB
ALBUMIN SERPL-MCNC: 3.7 G/DL (ref 3.1–4.5)
ALBUMIN/GLOB SERPL: 0.8 {RATIO} (ref 1–2)
ALP LIVER SERPL-CCNC: 89 U/L (ref 46–118)
ALT SERPL-CCNC: 27 U/L (ref 14–54)
ANION GAP SERPL CALC-SCNC: 7 MMOL/L (ref 0–18)
AST SERPL-CCNC: 25 U/L (ref 15–41)
BASOPHILS # BLD AUTO: 0.05 X10(3) UL (ref 0–0.2)
BASOPHILS NFR BLD AUTO: 0.6 %
BILIRUB SERPL-MCNC: 1 MG/DL (ref 0.1–2)
BUN BLD-MCNC: 13 MG/DL (ref 8–20)
BUN/CREAT SERPL: 16.3 (ref 10–20)
CALCIUM BLD-MCNC: 9.5 MG/DL (ref 8.3–10.3)
CHLORIDE SERPL-SCNC: 106 MMOL/L (ref 101–111)
CHOLEST SMN-MCNC: 245 MG/DL (ref ?–200)
CO2 SERPL-SCNC: 30 MMOL/L (ref 22–32)
CREAT BLD-MCNC: 0.8 MG/DL (ref 0.55–1.02)
CRP SERPL-MCNC: 1.94 MG/DL (ref ?–1)
DEPRECATED RDW RBC AUTO: 43.8 FL (ref 35.1–46.3)
EOSINOPHIL # BLD AUTO: 0.22 X10(3) UL (ref 0–0.7)
EOSINOPHIL NFR BLD AUTO: 2.6 %
ERYTHROCYTE [DISTWIDTH] IN BLOOD BY AUTOMATED COUNT: 13.7 % (ref 11–15)
EST. AVERAGE GLUCOSE BLD GHB EST-MCNC: 137 MG/DL (ref 68–126)
GLOBULIN PLAS-MCNC: 4.5 G/DL (ref 2.8–4.4)
GLUCOSE BLD-MCNC: 134 MG/DL (ref 70–99)
HBA1C MFR BLD HPLC: 6.4 % (ref ?–5.7)
HCT VFR BLD AUTO: 41.2 % (ref 35–48)
HDLC SERPL-MCNC: 79 MG/DL (ref 40–59)
HGB BLD-MCNC: 13 G/DL (ref 12–16)
IMM GRANULOCYTES # BLD AUTO: 0.02 X10(3) UL (ref 0–1)
IMM GRANULOCYTES NFR BLD: 0.2 %
LDLC SERPL CALC-MCNC: 135 MG/DL (ref ?–100)
LYMPHOCYTES # BLD AUTO: 3.1 X10(3) UL (ref 1–4)
LYMPHOCYTES NFR BLD AUTO: 37.1 %
M PROTEIN MFR SERPL ELPH: 8.2 G/DL (ref 6.4–8.2)
MCH RBC QN AUTO: 27.6 PG (ref 26–34)
MCHC RBC AUTO-ENTMCNC: 31.6 G/DL (ref 31–37)
MCV RBC AUTO: 87.5 FL (ref 80–100)
MONOCYTES # BLD AUTO: 0.5 X10(3) UL (ref 0.1–1)
MONOCYTES NFR BLD AUTO: 6 %
NEUTROPHILS # BLD AUTO: 4.47 X10 (3) UL (ref 1.5–7.7)
NEUTROPHILS # BLD AUTO: 4.47 X10(3) UL (ref 1.5–7.7)
NEUTROPHILS NFR BLD AUTO: 53.5 %
NONHDLC SERPL-MCNC: 166 MG/DL (ref ?–130)
OSMOLALITY SERPL CALC.SUM OF ELEC: 298 MOSM/KG (ref 275–295)
PLATELET # BLD AUTO: 282 10(3)UL (ref 150–450)
POTASSIUM SERPL-SCNC: 3.7 MMOL/L (ref 3.6–5.1)
RBC # BLD AUTO: 4.71 X10(6)UL (ref 3.8–5.3)
SED RATE-ML: 35 MM/HR (ref 0–25)
SODIUM SERPL-SCNC: 143 MMOL/L (ref 136–144)
TRIGL SERPL-MCNC: 156 MG/DL (ref 30–149)
VIT D+METAB SERPL-MCNC: 33.4 NG/ML (ref 30–100)
VLDLC SERPL CALC-MCNC: 31 MG/DL (ref 0–30)
WBC # BLD AUTO: 8.4 X10(3) UL (ref 4–11)

## 2019-02-02 PROCEDURE — 83036 HEMOGLOBIN GLYCOSYLATED A1C: CPT

## 2019-02-02 PROCEDURE — 80061 LIPID PANEL: CPT

## 2019-02-02 PROCEDURE — 95806 SLEEP STUDY UNATT&RESP EFFT: CPT

## 2019-02-02 PROCEDURE — 85025 COMPLETE CBC W/AUTO DIFF WBC: CPT

## 2019-02-02 PROCEDURE — 36415 COLL VENOUS BLD VENIPUNCTURE: CPT

## 2019-02-02 PROCEDURE — 86140 C-REACTIVE PROTEIN: CPT

## 2019-02-02 PROCEDURE — 80053 COMPREHEN METABOLIC PANEL: CPT

## 2019-02-02 PROCEDURE — 82306 VITAMIN D 25 HYDROXY: CPT

## 2019-02-02 PROCEDURE — 85652 RBC SED RATE AUTOMATED: CPT

## 2019-02-04 DIAGNOSIS — M54.12 CERVICAL RADICULOPATHY: ICD-10-CM

## 2019-02-05 NOTE — PROCEDURES
[address] [address]       Accredited by the [facility] of [facility] ([facility])    PATIENT'S NAME:        Gricel Barboza  ATTENDING PHYSICIAN:   Daria Don M.D. REFERRING PHYSICIAN:   Ilda Heaton M.D.   EMY as findings in this polysomnogram consistent with obstructive sleep apnea, it is recommended that patient pursue definitive therapy by undergoing CPAP desensitization followed by CPAP titration.   2.   Patient should avoid alcohol and sedative medications a

## 2019-02-05 NOTE — TELEPHONE ENCOUNTER
Rx given 1-8-19 for acute issue    Please contact pt to see if symptoms have returned  Per Dr Porras Miners may need imaging if symptoms continue  1.  Cervical radiculopathy  Local ice/heat, alternate  Advised on comfortable pillow for sleeping, Reviewed good post

## 2019-02-06 RX ORDER — PREDNISONE 20 MG/1
TABLET ORAL
Qty: 5 TABLET | Refills: 0 | OUTPATIENT
Start: 2019-02-06

## 2019-02-06 NOTE — TELEPHONE ENCOUNTER
Left detailed message for patient on phone. If not feeling better needs to f/u call back to discuss. Rx is going to be denied.

## 2019-02-11 RX ORDER — CYCLOBENZAPRINE HCL 5 MG
TABLET ORAL
Qty: 30 TABLET | Refills: 0 | OUTPATIENT
Start: 2019-02-11

## 2019-02-20 ENCOUNTER — OFFICE VISIT (OUTPATIENT)
Dept: FAMILY MEDICINE CLINIC | Facility: CLINIC | Age: 59
End: 2019-02-20
Payer: COMMERCIAL

## 2019-02-20 VITALS
HEART RATE: 87 BPM | WEIGHT: 189 LBS | RESPIRATION RATE: 18 BRPM | TEMPERATURE: 98 F | SYSTOLIC BLOOD PRESSURE: 112 MMHG | BODY MASS INDEX: 33.49 KG/M2 | OXYGEN SATURATION: 98 % | DIASTOLIC BLOOD PRESSURE: 68 MMHG | HEIGHT: 63 IN

## 2019-02-20 DIAGNOSIS — R53.82 CHRONIC FATIGUE: ICD-10-CM

## 2019-02-20 DIAGNOSIS — R73.03 PREDIABETES: ICD-10-CM

## 2019-02-20 DIAGNOSIS — K21.9 GASTROESOPHAGEAL REFLUX DISEASE WITHOUT ESOPHAGITIS: ICD-10-CM

## 2019-02-20 DIAGNOSIS — G47.33 OSA (OBSTRUCTIVE SLEEP APNEA): ICD-10-CM

## 2019-02-20 DIAGNOSIS — R79.82 ELEVATED C-REACTIVE PROTEIN (CRP): ICD-10-CM

## 2019-02-20 DIAGNOSIS — I10 ESSENTIAL HYPERTENSION: ICD-10-CM

## 2019-02-20 DIAGNOSIS — M13.0 POLYARTHRITIS: ICD-10-CM

## 2019-02-20 DIAGNOSIS — Z12.31 VISIT FOR SCREENING MAMMOGRAM: ICD-10-CM

## 2019-02-20 DIAGNOSIS — E78.2 MIXED HYPERLIPIDEMIA: Primary | ICD-10-CM

## 2019-02-20 PROCEDURE — 99214 OFFICE O/P EST MOD 30 MIN: CPT | Performed by: FAMILY MEDICINE

## 2019-02-20 RX ORDER — PROPRANOLOL/HYDROCHLOROTHIAZID 40 MG-25MG
TABLET ORAL
COMMUNITY
End: 2020-02-19

## 2019-02-20 RX ORDER — ROSUVASTATIN CALCIUM 5 MG/1
5 TABLET, COATED ORAL NIGHTLY
Qty: 30 TABLET | Refills: 2 | Status: SHIPPED | OUTPATIENT
Start: 2019-02-20 | End: 2020-02-19

## 2019-02-28 PROBLEM — R73.03 PREDIABETES: Status: ACTIVE | Noted: 2019-02-28

## 2019-02-28 PROBLEM — G47.33 OSA (OBSTRUCTIVE SLEEP APNEA): Status: ACTIVE | Noted: 2019-02-28

## 2019-02-28 PROBLEM — E78.2 MIXED HYPERLIPIDEMIA: Status: ACTIVE | Noted: 2019-02-28

## 2019-03-01 NOTE — PROGRESS NOTES
Jeri Dawkins is a 63year old female. HPI:  Patient was here for physical but states will r/s physical to discuss results and other issues today  Also needs to discuss recent test results. Had sleep apnea test done at home as approved by insurance.   Migdalia Grandchild Anxiety    • Esophageal reflux    • Hyperlipidemia    • Lumbago    • Meningioma (HCC)    • Migraines    • Osteoarthritis    • Sprain of neck    • Unspecified essential hypertension    • Unspecified essential hypertension    • Vertigo    • Vitamin D deficie murmur  GI: NABS, soft,obese, no tenderness, no masses, no HSM, ND  EXTREMITIES: no cyanosis, clubbing or edema  NEURO:  no focal deficits    ASSESSMENT AND PLAN:    1. Mixed hyperlipidemia  Lipid panel with elevated cholesterol.   Triglyceride is elevated drowsy  Weight loss will help. PULMONARY - INTERNAL    7. Chronic fatigue  Multifactorial.  Treatment for JOSÉ LUIS will certainly help. Advised healthy balanced meals and regular exercise. Reviewed sleep hygiene.     8.  GERD:  Clinically improved with addit

## 2019-04-22 ENCOUNTER — APPOINTMENT (OUTPATIENT)
Dept: LAB | Age: 59
End: 2019-04-22
Attending: INTERNAL MEDICINE
Payer: COMMERCIAL

## 2019-04-22 ENCOUNTER — OFFICE VISIT (OUTPATIENT)
Dept: RHEUMATOLOGY | Facility: CLINIC | Age: 59
End: 2019-04-22
Payer: COMMERCIAL

## 2019-04-22 VITALS
TEMPERATURE: 100 F | WEIGHT: 187 LBS | RESPIRATION RATE: 18 BRPM | BODY MASS INDEX: 34.41 KG/M2 | DIASTOLIC BLOOD PRESSURE: 77 MMHG | HEIGHT: 62 IN | HEART RATE: 92 BPM | SYSTOLIC BLOOD PRESSURE: 142 MMHG

## 2019-04-22 DIAGNOSIS — M17.0 OSTEOARTHRITIS OF BOTH KNEES, UNSPECIFIED OSTEOARTHRITIS TYPE: ICD-10-CM

## 2019-04-22 DIAGNOSIS — R79.82 ELEVATED C-REACTIVE PROTEIN (CRP): ICD-10-CM

## 2019-04-22 DIAGNOSIS — G89.29 CHRONIC NECK PAIN: ICD-10-CM

## 2019-04-22 DIAGNOSIS — G89.29 CHRONIC RIGHT SHOULDER PAIN: ICD-10-CM

## 2019-04-22 DIAGNOSIS — L65.9 HAIR LOSS: ICD-10-CM

## 2019-04-22 DIAGNOSIS — G89.29 CHRONIC MIDLINE LOW BACK PAIN WITHOUT SCIATICA: ICD-10-CM

## 2019-04-22 DIAGNOSIS — M19.90 INFLAMMATORY ARTHRITIS: Primary | ICD-10-CM

## 2019-04-22 DIAGNOSIS — M25.50 POLYARTHRALGIA: ICD-10-CM

## 2019-04-22 DIAGNOSIS — M54.2 CHRONIC NECK PAIN: ICD-10-CM

## 2019-04-22 DIAGNOSIS — M25.511 CHRONIC RIGHT SHOULDER PAIN: ICD-10-CM

## 2019-04-22 DIAGNOSIS — M19.90 INFLAMMATORY ARTHRITIS: ICD-10-CM

## 2019-04-22 DIAGNOSIS — M54.50 CHRONIC MIDLINE LOW BACK PAIN WITHOUT SCIATICA: ICD-10-CM

## 2019-04-22 DIAGNOSIS — H04.123 DRY EYE SYNDROME OF BOTH EYES: ICD-10-CM

## 2019-04-22 PROCEDURE — 84165 PROTEIN E-PHORESIS SERUM: CPT

## 2019-04-22 PROCEDURE — 86431 RHEUMATOID FACTOR QUANT: CPT

## 2019-04-22 PROCEDURE — 82784 ASSAY IGA/IGD/IGG/IGM EACH: CPT

## 2019-04-22 PROCEDURE — 85652 RBC SED RATE AUTOMATED: CPT

## 2019-04-22 PROCEDURE — 84550 ASSAY OF BLOOD/URIC ACID: CPT

## 2019-04-22 PROCEDURE — 36415 COLL VENOUS BLD VENIPUNCTURE: CPT

## 2019-04-22 PROCEDURE — 83883 ASSAY NEPHELOMETRY NOT SPEC: CPT

## 2019-04-22 PROCEDURE — 99244 OFF/OP CNSLTJ NEW/EST MOD 40: CPT | Performed by: INTERNAL MEDICINE

## 2019-04-22 PROCEDURE — 86140 C-REACTIVE PROTEIN: CPT

## 2019-04-22 PROCEDURE — 86334 IMMUNOFIX E-PHORESIS SERUM: CPT

## 2019-04-22 PROCEDURE — 86200 CCP ANTIBODY: CPT

## 2019-04-22 NOTE — PROGRESS NOTES
?  RHEUMATOLOGY NEW PATIENT   Date of visit: 4/22/2019  ? Patient presents with:  Establish Care: NEW [id number] co all over joint pain to bilateral shoulders, knees, anles, hands and fingers. Difficult to get moving in the morning.  Very stiff PEP. IGG; Future  -     XR LUMBAR SPINE (MIN 4 VIEWS) (CPT=72110); Future  -     XR CERVICAL SPINE (4VIEWS) (CPT=33950); Future  -     XR SHOULDER, COMPLETE (MIN 2 VIEWS), RIGHT (CPT=25340);  Future  -     URIC ACID, SERUM; Future  -     MONOCLONAL PROTEIN gone through about 8-10 injections of steroids as well as a few gel injections. States these have not helped much with her pain. States the pain in her knees has been present for over 10 years, left worse than right.     She has tried tylenol arthritis with ([facility].)    • Migraines    • Osteoarthritis    • Sleep apnea    • Sprain of neck    • Unspecified essential hypertension    • Unspecified essential hypertension    • Vertigo    • Vitamin D deficiency      Past Surgical History:  Past Surgical History:   Proce Modified Medications    No medications on file     There are no discontinued medications.   ?  ?  Allergies:    Imitrex [Sumatripta*    NAUSEA ONLY    Comment:bleeding  Imitrex [Sumatripta*    HIVES  Nexium [Esomeprazol*    ITCHING  Pollen oropharyngeal exudate. Eyes: Pupils are equal, round, and reactive to light. Conjunctivae and EOM are normal. No scleral icterus. Neck: Normal range of motion. Neck supple. No JVD present. No tracheal deviation present.    Cardiovascular: Normal rate, r VIEWS), LEFT (NKI=86727) (Order #[id number]) on 10/31/2017 - Order Result History Report   Signed by     Signed Date/Time  Phone Pager   Fern Tang 10/31/2017 19:42 [phone]      PROCEDURE:  XR WRIST COMPLETE (MIN 3 VIEWS), RIGHT (CPT=73110)     TECHNI TP 8.2 02/02/2019    ALB 3.7 02/02/2019    GLOBULT 3.1 01/12/2016    GLOBULIN 4.5 (H) 02/02/2019    ALBGLOBRAT 1.4 01/12/2016     02/02/2019    K 3.7 02/02/2019     02/02/2019    CO2 30.0 02/02/2019       Additional Labs:  02/2019  ESR 35 (elev

## 2019-04-25 ENCOUNTER — HOSPITAL ENCOUNTER (OUTPATIENT)
Dept: GENERAL RADIOLOGY | Age: 59
Discharge: HOME | End: 2019-04-25
Attending: INTERNAL MEDICINE
Payer: COMMERCIAL

## 2019-04-25 DIAGNOSIS — G89.29 CHRONIC RIGHT SHOULDER PAIN: ICD-10-CM

## 2019-04-25 DIAGNOSIS — M19.90 INFLAMMATORY ARTHRITIS: ICD-10-CM

## 2019-04-25 DIAGNOSIS — M54.50 CHRONIC MIDLINE LOW BACK PAIN WITHOUT SCIATICA: ICD-10-CM

## 2019-04-25 DIAGNOSIS — M54.2 CHRONIC NECK PAIN: ICD-10-CM

## 2019-04-25 DIAGNOSIS — M25.511 CHRONIC RIGHT SHOULDER PAIN: ICD-10-CM

## 2019-04-25 DIAGNOSIS — G89.29 CHRONIC MIDLINE LOW BACK PAIN WITHOUT SCIATICA: ICD-10-CM

## 2019-04-25 DIAGNOSIS — G89.29 CHRONIC NECK PAIN: ICD-10-CM

## 2019-04-29 ENCOUNTER — TELEPHONE (OUTPATIENT)
Dept: RHEUMATOLOGY | Facility: CLINIC | Age: 59
End: 2019-04-29

## 2019-07-30 ENCOUNTER — OFFICE VISIT (OUTPATIENT)
Dept: FAMILY MEDICINE CLINIC | Facility: CLINIC | Age: 59
End: 2019-07-30
Payer: COMMERCIAL

## 2019-07-30 VITALS
OXYGEN SATURATION: 98 % | RESPIRATION RATE: 16 BRPM | HEART RATE: 96 BPM | HEIGHT: 60.63 IN | TEMPERATURE: 98 F | DIASTOLIC BLOOD PRESSURE: 74 MMHG | BODY MASS INDEX: 35.96 KG/M2 | SYSTOLIC BLOOD PRESSURE: 128 MMHG | WEIGHT: 188 LBS

## 2019-07-30 DIAGNOSIS — I10 ESSENTIAL HYPERTENSION: Primary | ICD-10-CM

## 2019-07-30 DIAGNOSIS — M25.562 CHRONIC PAIN OF BOTH KNEES: ICD-10-CM

## 2019-07-30 DIAGNOSIS — E78.2 MIXED HYPERLIPIDEMIA: ICD-10-CM

## 2019-07-30 DIAGNOSIS — Z51.81 ENCOUNTER FOR MEDICATION MONITORING: ICD-10-CM

## 2019-07-30 DIAGNOSIS — K21.9 CHRONIC GERD: ICD-10-CM

## 2019-07-30 DIAGNOSIS — M17.0 BILATERAL PRIMARY OSTEOARTHRITIS OF KNEE: ICD-10-CM

## 2019-07-30 DIAGNOSIS — Z71.84 COUNSELING ABOUT TRAVEL: ICD-10-CM

## 2019-07-30 DIAGNOSIS — M25.512 ACUTE PAIN OF LEFT SHOULDER: ICD-10-CM

## 2019-07-30 DIAGNOSIS — G47.33 OSA (OBSTRUCTIVE SLEEP APNEA): ICD-10-CM

## 2019-07-30 DIAGNOSIS — R73.01 IFG (IMPAIRED FASTING GLUCOSE): ICD-10-CM

## 2019-07-30 DIAGNOSIS — M25.561 CHRONIC PAIN OF BOTH KNEES: ICD-10-CM

## 2019-07-30 DIAGNOSIS — M25.50 POLYARTHRALGIA: ICD-10-CM

## 2019-07-30 DIAGNOSIS — M54.16 CHRONIC LUMBAR RADICULOPATHY: ICD-10-CM

## 2019-07-30 DIAGNOSIS — G89.29 CHRONIC PAIN OF BOTH KNEES: ICD-10-CM

## 2019-07-30 PROCEDURE — 99214 OFFICE O/P EST MOD 30 MIN: CPT | Performed by: FAMILY MEDICINE

## 2019-07-30 RX ORDER — AZITHROMYCIN 250 MG/1
TABLET, FILM COATED ORAL
Qty: 6 TABLET | Refills: 0 | Status: SHIPPED | OUTPATIENT
Start: 2019-07-30 | End: 2019-08-26 | Stop reason: ALTCHOICE

## 2019-07-30 RX ORDER — MELOXICAM 7.5 MG/1
7.5 TABLET ORAL
Qty: 30 TABLET | Refills: 1 | Status: SHIPPED | OUTPATIENT
Start: 2019-07-30 | End: 2019-10-28

## 2019-07-30 RX ORDER — METFORMIN HYDROCHLORIDE 500 MG/1
500 TABLET, EXTENDED RELEASE ORAL DAILY
Qty: 90 TABLET | Refills: 1 | Status: CANCELLED | OUTPATIENT
Start: 2019-07-30

## 2019-07-30 RX ORDER — ROSUVASTATIN CALCIUM 5 MG/1
5 TABLET, COATED ORAL NIGHTLY
Qty: 30 TABLET | Refills: 2 | Status: CANCELLED | OUTPATIENT
Start: 2019-07-30

## 2019-07-30 RX ORDER — LISINOPRIL AND HYDROCHLOROTHIAZIDE 12.5; 1 MG/1; MG/1
1 TABLET ORAL
Qty: 90 TABLET | Refills: 2 | Status: SHIPPED | OUTPATIENT
Start: 2019-07-30 | End: 2020-02-19

## 2019-07-30 NOTE — PROGRESS NOTES
Kaylen Cee is a 63year old female. HPI:  Pt is here for f/u on meds  C/o flare of bilat knee pain for a few weeks. Pain sometimes radiates down to lower legs  Take Tylenol Arthrits prn with mild, transient relief.   If takes Advil or ibuprofen regu 500 MG Oral Tablet 24 Hr Take 1 tablet (500 mg total) by mouth daily. Take with food Disp: 90 tablet Rfl: 1   omeprazole 20 MG Oral Capsule Delayed Release Take 1 capsule (20 mg total) by mouth every morning.  Disp: 90 capsule Rfl: 2   RaNITidine HCl 150 MG developed, well nourished,in mild distress due to joint pains.   Pleasant affect  SKIN: no rashes,no suspicious lesions  HEENT: atraumatic, normocephalic,  NECK: supple,no adenopathy,noTM  LUNGS: clear to auscultation  CARDIO: RRR without murmur  GI: NABS, needed. ,    8. Mixed hyperlipidemia  Reviewed diet/exercise/lipid goals. Continue statin. Monitor LFTs. advised to get fasting labs done as ordered. - COMP METABOLIC PANEL (14); Future  - LIPID PANEL; Future    9.  Encounter for medication monitoring  -

## 2019-08-26 ENCOUNTER — OFFICE VISIT (OUTPATIENT)
Dept: FAMILY MEDICINE CLINIC | Facility: CLINIC | Age: 59
End: 2019-08-26
Payer: COMMERCIAL

## 2019-08-26 VITALS
OXYGEN SATURATION: 99 % | TEMPERATURE: 98 F | RESPIRATION RATE: 16 BRPM | HEART RATE: 93 BPM | WEIGHT: 188 LBS | BODY MASS INDEX: 35.96 KG/M2 | SYSTOLIC BLOOD PRESSURE: 136 MMHG | DIASTOLIC BLOOD PRESSURE: 78 MMHG | HEIGHT: 60.63 IN

## 2019-08-26 DIAGNOSIS — G47.33 OSA (OBSTRUCTIVE SLEEP APNEA): ICD-10-CM

## 2019-08-26 DIAGNOSIS — R05.9 COUGH: Primary | ICD-10-CM

## 2019-08-26 DIAGNOSIS — I10 ESSENTIAL HYPERTENSION: ICD-10-CM

## 2019-08-26 DIAGNOSIS — G62.9 NEUROPATHY: ICD-10-CM

## 2019-08-26 DIAGNOSIS — M17.12 PRIMARY OSTEOARTHRITIS OF LEFT KNEE: ICD-10-CM

## 2019-08-26 DIAGNOSIS — E78.2 MIXED HYPERLIPIDEMIA: ICD-10-CM

## 2019-08-26 DIAGNOSIS — M54.16 CHRONIC LUMBAR RADICULOPATHY: ICD-10-CM

## 2019-08-26 DIAGNOSIS — G89.29 OTHER CHRONIC PAIN: ICD-10-CM

## 2019-08-26 DIAGNOSIS — R73.03 PREDIABETES: ICD-10-CM

## 2019-08-26 PROCEDURE — 99214 OFFICE O/P EST MOD 30 MIN: CPT | Performed by: FAMILY MEDICINE

## 2019-08-26 RX ORDER — GABAPENTIN 100 MG/1
100 CAPSULE ORAL NIGHTLY
Qty: 30 CAPSULE | Refills: 1 | Status: SHIPPED | OUTPATIENT
Start: 2019-08-26 | End: 2019-10-28

## 2019-08-26 NOTE — PROGRESS NOTES
Ashutosh Suarez is a 63year old female. HPI:  Pt just returned from [facility] to [address]  Did well while there despite her chronic pain.  Was able to walk a little and used wheelchair too   Had some nasal congestion and cough that started while she wa by mouth every 6 (six) hours as needed. Disp:  Rfl:    Rosuvastatin Calcium (CRESTOR) 5 MG Oral Tab Take 1 tablet (5 mg total) by mouth nightly.  Disp: 30 tablet Rfl: 2         Past Medical History:   Diagnosis Date   • Anxiety    • Esophageal reflux    • H supple,no adenopathy,noTM  LUNGS: clear to auscultation, no wheezing  CARDIO: RRR without murmur  GI: NABS, soft, no tenderness, no masses, no HSM, ND  EXTREMITIES: no cyanosis, clubbing or edema  Back with mild bilateral paralumbar tenderness.   Straight l

## 2019-08-28 ENCOUNTER — APPOINTMENT (OUTPATIENT)
Dept: LAB | Age: 59
End: 2019-08-28
Attending: FAMILY MEDICINE
Payer: COMMERCIAL

## 2019-08-28 DIAGNOSIS — Z51.81 ENCOUNTER FOR MEDICATION MONITORING: ICD-10-CM

## 2019-08-28 DIAGNOSIS — R73.03 PREDIABETES: ICD-10-CM

## 2019-08-28 DIAGNOSIS — I10 ESSENTIAL HYPERTENSION: ICD-10-CM

## 2019-08-28 DIAGNOSIS — E78.2 MIXED HYPERLIPIDEMIA: ICD-10-CM

## 2019-08-28 DIAGNOSIS — R73.01 IFG (IMPAIRED FASTING GLUCOSE): ICD-10-CM

## 2019-08-28 LAB
ALBUMIN SERPL-MCNC: 3.8 G/DL (ref 3.4–5)
ALBUMIN/GLOB SERPL: 0.9 {RATIO} (ref 1–2)
ALP LIVER SERPL-CCNC: 84 U/L (ref 46–118)
ALT SERPL-CCNC: 30 U/L (ref 13–56)
ANION GAP SERPL CALC-SCNC: 6 MMOL/L (ref 0–18)
AST SERPL-CCNC: 19 U/L (ref 15–37)
BILIRUB SERPL-MCNC: 0.8 MG/DL (ref 0.1–2)
BUN BLD-MCNC: 17 MG/DL (ref 7–18)
BUN/CREAT SERPL: 23 (ref 10–20)
CALCIUM BLD-MCNC: 9.5 MG/DL (ref 8.5–10.1)
CHLORIDE SERPL-SCNC: 105 MMOL/L (ref 98–112)
CHOLEST SMN-MCNC: 227 MG/DL (ref ?–200)
CO2 SERPL-SCNC: 31 MMOL/L (ref 21–32)
CREAT BLD-MCNC: 0.74 MG/DL (ref 0.55–1.02)
EST. AVERAGE GLUCOSE BLD GHB EST-MCNC: 137 MG/DL (ref 68–126)
GLOBULIN PLAS-MCNC: 4.3 G/DL (ref 2.8–4.4)
GLUCOSE BLD-MCNC: 110 MG/DL (ref 70–99)
HBA1C MFR BLD HPLC: 6.4 % (ref ?–5.7)
HDLC SERPL-MCNC: 77 MG/DL (ref 40–59)
LDLC SERPL CALC-MCNC: 124 MG/DL (ref ?–100)
M PROTEIN MFR SERPL ELPH: 8.1 G/DL (ref 6.4–8.2)
NONHDLC SERPL-MCNC: 150 MG/DL (ref ?–130)
OSMOLALITY SERPL CALC.SUM OF ELEC: 296 MOSM/KG (ref 275–295)
POTASSIUM SERPL-SCNC: 4.4 MMOL/L (ref 3.5–5.1)
SODIUM SERPL-SCNC: 142 MMOL/L (ref 136–145)
TRIGL SERPL-MCNC: 128 MG/DL (ref 30–149)
VLDLC SERPL CALC-MCNC: 26 MG/DL (ref 0–30)

## 2019-08-28 PROCEDURE — 83036 HEMOGLOBIN GLYCOSYLATED A1C: CPT

## 2019-08-28 PROCEDURE — 80053 COMPREHEN METABOLIC PANEL: CPT

## 2019-08-28 PROCEDURE — 36415 COLL VENOUS BLD VENIPUNCTURE: CPT

## 2019-08-28 PROCEDURE — 80061 LIPID PANEL: CPT

## 2019-10-28 RX ORDER — MELOXICAM 7.5 MG/1
7.5 TABLET ORAL
Qty: 30 TABLET | Refills: 0 | Status: SHIPPED | OUTPATIENT
Start: 2019-10-28 | End: 2019-11-27

## 2019-10-28 RX ORDER — GABAPENTIN 100 MG/1
100 CAPSULE ORAL NIGHTLY
Qty: 30 CAPSULE | Refills: 1 | Status: SHIPPED | OUTPATIENT
Start: 2019-10-28 | End: 2019-12-23

## 2019-11-27 RX ORDER — MELOXICAM 7.5 MG/1
7.5 TABLET ORAL
Qty: 30 TABLET | Refills: 0 | Status: SHIPPED | OUTPATIENT
Start: 2019-11-27 | End: 2020-03-01

## 2019-11-27 NOTE — TELEPHONE ENCOUNTER
LOV 8/26/19     Return in about 2 months (around 10/26/2019). LAST LAB none    LAST RX 10/28/19 30 tablet 0 refills       Next OV No future appointments.       PROTOCOL none    Name from pharmacy: MELOXICAM  7.5 MG TABLET          Will file in chart a Patient is not neutropenic, afebrile   If febrile pan cultures and CXR   Continue Ceftriaxone, need to clarify with ID re dosage   FU cultures: blood, wound  Pediatrist following   PT wound care to see pt  Local wound care  X ray of right foot Patient is not neutropenic, afebrile   If febrile pan cultures and CXR   Continue Ceftriaxone  FU cultures: blood, wound  Pediatrist following   PT wound care to see pt  Local wound care  X ray of right foot

## 2019-12-06 ENCOUNTER — OFFICE VISIT (OUTPATIENT)
Dept: FAMILY MEDICINE CLINIC | Facility: CLINIC | Age: 59
End: 2019-12-06
Payer: COMMERCIAL

## 2019-12-06 VITALS
HEIGHT: 60.63 IN | WEIGHT: 191 LBS | OXYGEN SATURATION: 98 % | HEART RATE: 92 BPM | BODY MASS INDEX: 36.53 KG/M2 | DIASTOLIC BLOOD PRESSURE: 82 MMHG | RESPIRATION RATE: 16 BRPM | SYSTOLIC BLOOD PRESSURE: 122 MMHG | TEMPERATURE: 98 F

## 2019-12-06 DIAGNOSIS — R05.9 COUGH: ICD-10-CM

## 2019-12-06 DIAGNOSIS — B34.9 VIRAL SYNDROME: Primary | ICD-10-CM

## 2019-12-06 PROCEDURE — 99202 OFFICE O/P NEW SF 15 MIN: CPT | Performed by: NURSE PRACTITIONER

## 2019-12-06 RX ORDER — ALBUTEROL SULFATE 90 UG/1
2 AEROSOL, METERED RESPIRATORY (INHALATION) EVERY 4 HOURS PRN
Qty: 1 INHALER | Refills: 0 | Status: SHIPPED | OUTPATIENT
Start: 2019-12-06 | End: 2019-12-06

## 2019-12-06 RX ORDER — ALBUTEROL SULFATE 90 UG/1
2 AEROSOL, METERED RESPIRATORY (INHALATION) EVERY 4 HOURS PRN
Qty: 1 INHALER | Refills: 0 | Status: SHIPPED | OUTPATIENT
Start: 2019-12-06 | End: 2020-02-19

## 2019-12-06 RX ORDER — BENZONATATE 200 MG/1
200 CAPSULE ORAL 3 TIMES DAILY PRN
Qty: 30 CAPSULE | Refills: 0 | Status: SHIPPED | OUTPATIENT
Start: 2019-12-06 | End: 2020-02-19

## 2019-12-06 RX ORDER — BENZONATATE 200 MG/1
200 CAPSULE ORAL 3 TIMES DAILY PRN
Qty: 30 CAPSULE | Refills: 0 | Status: SHIPPED | OUTPATIENT
Start: 2019-12-06 | End: 2019-12-06

## 2019-12-07 NOTE — PATIENT INSTRUCTIONS
-stay well hydrated and rest  -humidifier overnight  -follow up with PCP if new or worsening symptoms, or no improvement in next 4-5 days  -inhaler as needed  -over the counter cough  Medications such as robitussin DM and delsym can be helpful        Viral include water; orange juice; lemonade; apple, grape, and cranberry juice; clear fruit drinks; electrolyte replacement and sports drinks; and decaffeinated teas and coffee.  If you have been diagnosed with a kidney disease, ask your healthcare provider how m

## 2019-12-07 NOTE — PROGRESS NOTES
CHIEF COMPLAINT:   Patient presents with:  Cough: congestion. s/s for 4 days. OTC meds taken      HPI:   Emely Ventura is a 63year old female who presents with  for viral symptoms and congestion for  4 days.  Patient reports congested cough, s Rosuvastatin Calcium (CRESTOR) 5 MG Oral Tab, Take 1 tablet (5 mg total) by mouth nightly. (Patient not taking: Reported on 12/6/2019 ), Disp: 30 tablet, Rfl: 2  RaNITidine HCl 150 MG Oral Tab, Take 1 tablet (150 mg total) by mouth nightly.  (Patient not ta THROAT: Oral mucosa pink, moist. Posterior pharynx is not erythematous. no exudates. Tonsils 1/4. NECK: Supple, non-tender  LUNGS: clear to auscultation bilaterally, no wheezes or rhonchi. Normal respiratory effort without retraction.  +congested cough A viral illness may cause a number of symptoms such as fever. Other symptoms depend on the part of the body that the virus affects.  If it settles in your nose, throat, and lungs, it may cause cough, sore throat, congestion, runny nose, headache, earache an · Your appetite may be poor, so a light diet is fine. Avoid dehydration by drinking 8 to 12, 8-ounce glasses of fluids each day.  This may include water; orange juice; lemonade; apple, grape, and cranberry juice; clear fruit drinks; electrolyte replacement © 5547-6286 [employer] [address] [employer], [address] [place]. All rights reserved. This information is not intended as a substitute for professional medical care. Always follow your healthcare professional's instructions.             The

## 2019-12-23 RX ORDER — GABAPENTIN 100 MG/1
100 CAPSULE ORAL NIGHTLY
Qty: 30 CAPSULE | Refills: 0 | Status: SHIPPED | OUTPATIENT
Start: 2019-12-23 | End: 2020-01-25

## 2020-01-21 ENCOUNTER — TELEPHONE (OUTPATIENT)
Dept: FAMILY MEDICINE CLINIC | Facility: CLINIC | Age: 60
End: 2020-01-21

## 2020-01-24 NOTE — TELEPHONE ENCOUNTER
LOV 8/26/19      LAST LAB none    LAST RX  12/23/19 30 capsule 0 refills    Next OV   Future Appointments   Date Time Provider Lilliana Afsaneh   2/19/2020  3:40 PM Jah Worrell MD [facility]         PROTOCOL none    Name from pharmacy: Mauricio Pike

## 2020-01-25 RX ORDER — GABAPENTIN 100 MG/1
100 CAPSULE ORAL NIGHTLY
Qty: 30 CAPSULE | Refills: 0 | Status: SHIPPED | OUTPATIENT
Start: 2020-01-25 | End: 2020-03-01

## 2020-02-19 ENCOUNTER — MED REC SCAN ONLY (OUTPATIENT)
Dept: FAMILY MEDICINE CLINIC | Facility: CLINIC | Age: 60
End: 2020-02-19

## 2020-02-19 ENCOUNTER — OFFICE VISIT (OUTPATIENT)
Dept: FAMILY MEDICINE CLINIC | Facility: CLINIC | Age: 60
End: 2020-02-19
Payer: COMMERCIAL

## 2020-02-19 VITALS
WEIGHT: 190 LBS | TEMPERATURE: 99 F | HEART RATE: 89 BPM | SYSTOLIC BLOOD PRESSURE: 118 MMHG | OXYGEN SATURATION: 98 % | DIASTOLIC BLOOD PRESSURE: 78 MMHG | RESPIRATION RATE: 16 BRPM | BODY MASS INDEX: 36.34 KG/M2 | HEIGHT: 60.75 IN

## 2020-02-19 DIAGNOSIS — E66.9 OBESITY (BMI 35.0-39.9 WITHOUT COMORBIDITY): ICD-10-CM

## 2020-02-19 DIAGNOSIS — K21.9 CHRONIC GERD: ICD-10-CM

## 2020-02-19 DIAGNOSIS — Z01.419 ROUTINE GYNECOLOGICAL EXAMINATION: Primary | ICD-10-CM

## 2020-02-19 DIAGNOSIS — N81.10 FEMALE BLADDER PROLAPSE: ICD-10-CM

## 2020-02-19 DIAGNOSIS — Z12.31 VISIT FOR SCREENING MAMMOGRAM: ICD-10-CM

## 2020-02-19 DIAGNOSIS — Z80.3 FAMILY HISTORY OF BREAST CANCER IN SISTER: ICD-10-CM

## 2020-02-19 DIAGNOSIS — Z12.11 ENCOUNTER FOR SCREENING COLONOSCOPY: ICD-10-CM

## 2020-02-19 DIAGNOSIS — Z00.00 LABORATORY EXAMINATION ORDERED AS PART OF A COMPLETE PHYSICAL EXAMINATION: ICD-10-CM

## 2020-02-19 DIAGNOSIS — G47.33 OSA (OBSTRUCTIVE SLEEP APNEA): ICD-10-CM

## 2020-02-19 DIAGNOSIS — R79.82 ELEVATED C-REACTIVE PROTEIN (CRP): ICD-10-CM

## 2020-02-19 DIAGNOSIS — H81.10 BENIGN PAROXYSMAL POSITIONAL VERTIGO, UNSPECIFIED LATERALITY: ICD-10-CM

## 2020-02-19 DIAGNOSIS — I10 ESSENTIAL HYPERTENSION: ICD-10-CM

## 2020-02-19 DIAGNOSIS — E78.2 MIXED HYPERLIPIDEMIA: ICD-10-CM

## 2020-02-19 DIAGNOSIS — R73.03 PREDIABETES: ICD-10-CM

## 2020-02-19 DIAGNOSIS — M13.0 POLYARTHRITIS: ICD-10-CM

## 2020-02-19 PROCEDURE — 87624 HPV HI-RISK TYP POOLED RSLT: CPT | Performed by: FAMILY MEDICINE

## 2020-02-19 PROCEDURE — 88175 CYTOPATH C/V AUTO FLUID REDO: CPT | Performed by: FAMILY MEDICINE

## 2020-02-19 PROCEDURE — 99214 OFFICE O/P EST MOD 30 MIN: CPT | Performed by: FAMILY MEDICINE

## 2020-02-19 PROCEDURE — 99396 PREV VISIT EST AGE 40-64: CPT | Performed by: FAMILY MEDICINE

## 2020-02-19 RX ORDER — OMEPRAZOLE 20 MG/1
20 CAPSULE, DELAYED RELEASE ORAL EVERY MORNING
Qty: 90 CAPSULE | Refills: 1 | Status: SHIPPED | OUTPATIENT
Start: 2020-02-19 | End: 2020-08-20

## 2020-02-19 RX ORDER — ROSUVASTATIN CALCIUM 5 MG/1
5 TABLET, COATED ORAL NIGHTLY
Qty: 90 TABLET | Refills: 1 | Status: SHIPPED | OUTPATIENT
Start: 2020-02-19 | End: 2020-08-20

## 2020-02-19 RX ORDER — MECLIZINE HCL 12.5 MG/1
12.5 TABLET ORAL 2 TIMES DAILY PRN
Qty: 20 TABLET | Refills: 0 | Status: SHIPPED | OUTPATIENT
Start: 2020-02-19 | End: 2020-05-18

## 2020-02-19 RX ORDER — LISINOPRIL AND HYDROCHLOROTHIAZIDE 12.5; 1 MG/1; MG/1
1 TABLET ORAL
Qty: 90 TABLET | Refills: 1 | Status: SHIPPED | OUTPATIENT
Start: 2020-02-19 | End: 2020-08-20

## 2020-02-21 ENCOUNTER — TELEPHONE (OUTPATIENT)
Dept: FAMILY MEDICINE CLINIC | Facility: CLINIC | Age: 60
End: 2020-02-21

## 2020-02-21 NOTE — TELEPHONE ENCOUNTER
Patients  called said he is trying to schedule his wife for gastro dr and they are asking him why she needs to be seen and he said he does not know , wanting us to ask dr Narinder Elkins why ??

## 2020-02-21 NOTE — TELEPHONE ENCOUNTER
I called and spoke with the spouse of patient per consent. Explained why GI appt, states understanding and agrees to plan.

## 2020-02-23 LAB — HPV I/H RISK 1 DNA SPEC QL NAA+PROBE: NEGATIVE

## 2020-03-31 ENCOUNTER — TELEPHONE (OUTPATIENT)
Dept: FAMILY MEDICINE CLINIC | Facility: CLINIC | Age: 60
End: 2020-03-31

## 2020-03-31 NOTE — TELEPHONE ENCOUNTER
I spoke with patients dtr and notified of below. She will see about getting lower dose but if not able to will start the one she has and monitor b/p closely. Will call if problem or see provider in [facility] to consult with.

## 2020-03-31 NOTE — TELEPHONE ENCOUNTER
Pt's dtr sent [employer] Message through her own chart. Message being placed now in pt's chart:    Per dtr, Hayley Lyn: I had a quick question for my mom who is currently stuck in [place] and has ran out of her BP meds.  She purchased this one (picture attached

## 2020-05-15 ENCOUNTER — OFFICE VISIT (OUTPATIENT)
Dept: FAMILY MEDICINE CLINIC | Facility: CLINIC | Age: 60
End: 2020-05-15
Payer: COMMERCIAL

## 2020-05-15 VITALS
OXYGEN SATURATION: 98 % | BODY MASS INDEX: 34.81 KG/M2 | TEMPERATURE: 99 F | HEART RATE: 79 BPM | RESPIRATION RATE: 18 BRPM | HEIGHT: 60.75 IN | DIASTOLIC BLOOD PRESSURE: 76 MMHG | WEIGHT: 182 LBS | SYSTOLIC BLOOD PRESSURE: 120 MMHG

## 2020-05-15 DIAGNOSIS — R10.11 RUQ ABDOMINAL PAIN: ICD-10-CM

## 2020-05-15 DIAGNOSIS — M77.8 TENDINITIS OF RIGHT SHOULDER: Primary | ICD-10-CM

## 2020-05-15 DIAGNOSIS — Z51.81 ENCOUNTER FOR MEDICATION MONITORING: ICD-10-CM

## 2020-05-15 DIAGNOSIS — I10 ESSENTIAL HYPERTENSION: ICD-10-CM

## 2020-05-15 DIAGNOSIS — K21.9 GASTROESOPHAGEAL REFLUX DISEASE WITHOUT ESOPHAGITIS: ICD-10-CM

## 2020-05-15 DIAGNOSIS — R73.03 PREDIABETES: ICD-10-CM

## 2020-05-15 DIAGNOSIS — E78.2 MIXED HYPERLIPIDEMIA: ICD-10-CM

## 2020-05-15 DIAGNOSIS — M17.12 PRIMARY OSTEOARTHRITIS OF LEFT KNEE: ICD-10-CM

## 2020-05-15 DIAGNOSIS — M54.2 NECK PAIN, CHRONIC: ICD-10-CM

## 2020-05-15 DIAGNOSIS — G89.29 NECK PAIN, CHRONIC: ICD-10-CM

## 2020-05-15 DIAGNOSIS — R25.2 HAND CRAMPS: ICD-10-CM

## 2020-05-15 PROCEDURE — 99214 OFFICE O/P EST MOD 30 MIN: CPT | Performed by: FAMILY MEDICINE

## 2020-05-15 RX ORDER — GABAPENTIN 100 MG/1
100 CAPSULE ORAL NIGHTLY
COMMUNITY
End: 2020-10-27 | Stop reason: ALTCHOICE

## 2020-05-15 RX ORDER — MAGNESIUM 200 MG
TABLET ORAL DAILY
COMMUNITY
End: 2020-10-27 | Stop reason: ALTCHOICE

## 2020-05-15 RX ORDER — MELOXICAM 7.5 MG/1
7.5 TABLET ORAL DAILY PRN
COMMUNITY
End: 2020-08-12

## 2020-05-15 NOTE — PATIENT INSTRUCTIONS
Take vitamin D3 supplement, 1000 units daily    Call central scheduling, [phone] to schedule appointment for fasting blood test, ultrasound and x-rays    Schedule Physical Therapy for R shoulder/Neck pain    MSM supplements are generally considered sa

## 2020-05-20 ENCOUNTER — OFFICE VISIT (OUTPATIENT)
Dept: PHYSICAL THERAPY | Age: 60
End: 2020-05-20
Attending: FAMILY MEDICINE
Payer: COMMERCIAL

## 2020-05-20 DIAGNOSIS — M54.2 NECK PAIN, CHRONIC: ICD-10-CM

## 2020-05-20 DIAGNOSIS — M77.8 TENDINITIS OF RIGHT SHOULDER: ICD-10-CM

## 2020-05-20 DIAGNOSIS — G89.29 NECK PAIN, CHRONIC: ICD-10-CM

## 2020-05-20 PROCEDURE — 97110 THERAPEUTIC EXERCISES: CPT

## 2020-05-20 PROCEDURE — 97140 MANUAL THERAPY 1/> REGIONS: CPT

## 2020-05-20 PROCEDURE — 97161 PT EVAL LOW COMPLEX 20 MIN: CPT

## 2020-05-20 NOTE — PROGRESS NOTES
SHOULDER EVALUATION:   Referring Physician: Dr. David Ruff  Diagnosis: R shoulder tendinitis     Date of Service: 5/20/2020     PATIENT SUMMARY   Candelario Jeter is a 63year old female who presents to therapy today with complaints of R shoulder pain for ~1 m deficits include but are not limited to difficulty reaching, lifting laundry, cooking, cleaning. Signs and symptoms are consistent with diagnosis of R shoulder tendinitis with neck pain likely due to over compensation from R shoulder weakness.  Pt and PT d Therex x1      Total Timed Treatment: 24 min     Total Treatment Time: 45 min      PLAN OF CARE:    Goals:  (to be met in 12 visits)  · Pt will report improved ability to sleep without waking due to shoulder pain   · Pt will improve shoulder flexion AROM t

## 2020-05-25 ENCOUNTER — HOSPITAL ENCOUNTER (OUTPATIENT)
Dept: GENERAL RADIOLOGY | Age: 60
Discharge: HOME | End: 2020-05-25
Attending: FAMILY MEDICINE
Payer: COMMERCIAL

## 2020-05-25 DIAGNOSIS — G89.29 NECK PAIN, CHRONIC: ICD-10-CM

## 2020-05-25 DIAGNOSIS — M77.8 TENDINITIS OF RIGHT SHOULDER: ICD-10-CM

## 2020-05-25 DIAGNOSIS — M54.2 NECK PAIN, CHRONIC: ICD-10-CM

## 2020-05-25 PROCEDURE — 72050 X-RAY EXAM NECK SPINE 4/5VWS: CPT | Performed by: FAMILY MEDICINE

## 2020-05-25 PROCEDURE — 73030 X-RAY EXAM OF SHOULDER: CPT | Performed by: FAMILY MEDICINE

## 2020-05-26 ENCOUNTER — OFFICE VISIT (OUTPATIENT)
Dept: PHYSICAL THERAPY | Age: 60
End: 2020-05-26
Attending: FAMILY MEDICINE
Payer: COMMERCIAL

## 2020-05-26 PROCEDURE — 97140 MANUAL THERAPY 1/> REGIONS: CPT

## 2020-05-26 PROCEDURE — 97110 THERAPEUTIC EXERCISES: CPT

## 2020-05-26 NOTE — PROGRESS NOTES
Dx: R shoulder tendinitis         Insurance (Authorized # of Visits):  PPO           Authorizing Physician: Dr. Milena Colunga MD visit: none scheduled  Fall Risk: standard         Precautions: n/a             Subjective: Had a busy weekend cooking.  Shoulder or heat; reports she will ice at home              HEP: seated table AAROM flex and ABD, scap retractions, UT stretch     Charges:  Man x2, therex x1       Total Timed Treatment: 40 min  Total Treatment Time: 40 min

## 2020-05-27 ENCOUNTER — OFFICE VISIT (OUTPATIENT)
Dept: PHYSICAL THERAPY | Age: 60
End: 2020-05-27
Attending: FAMILY MEDICINE
Payer: COMMERCIAL

## 2020-05-27 PROCEDURE — 97140 MANUAL THERAPY 1/> REGIONS: CPT

## 2020-05-27 PROCEDURE — 97110 THERAPEUTIC EXERCISES: CPT

## 2020-05-27 NOTE — PROGRESS NOTES
Dx: R shoulder tendinitis         Insurance (Authorized # of Visits):  PPO           Authorizing Physician: Dr. Kaylyn Rebolledo  Next MD visit: none scheduled  Fall Risk: standard         Precautions: n/a             Subjective: Arm was tired after last session, bu 2x8 Therex  UBE retro x1 min (pain)  Pullies flex x1 min (good)  -abd x6 reps (pain)  YTB rows 2x10  -ext 1x10 (sore)  UT stretch -held  sidelying AAROM   -flex 1x8  -abd 1x8  -ER 2x8      Manual therapy   STM suboccipital, cervical paraspinals, UT x4 min

## 2020-06-01 ENCOUNTER — HOSPITAL ENCOUNTER (OUTPATIENT)
Dept: ULTRASOUND IMAGING | Age: 60
Discharge: HOME | End: 2020-06-01
Attending: FAMILY MEDICINE
Payer: COMMERCIAL

## 2020-06-01 DIAGNOSIS — R10.11 RUQ ABDOMINAL PAIN: ICD-10-CM

## 2020-06-01 PROCEDURE — 76700 US EXAM ABDOM COMPLETE: CPT | Performed by: FAMILY MEDICINE

## 2020-06-02 ENCOUNTER — TELEPHONE (OUTPATIENT)
Dept: PHYSICAL THERAPY | Age: 60
End: 2020-06-02

## 2020-06-02 ENCOUNTER — APPOINTMENT (OUTPATIENT)
Dept: PHYSICAL THERAPY | Age: 60
End: 2020-06-02
Payer: COMMERCIAL

## 2020-06-02 NOTE — TELEPHONE ENCOUNTER
Pt's  called to cancel therapy visits. He was furloughed from work and currently working on changing their health benefits. Will reschedule when able.

## 2020-06-04 ENCOUNTER — APPOINTMENT (OUTPATIENT)
Dept: PHYSICAL THERAPY | Age: 60
End: 2020-06-04
Payer: COMMERCIAL

## 2020-06-09 ENCOUNTER — APPOINTMENT (OUTPATIENT)
Dept: PHYSICAL THERAPY | Age: 60
End: 2020-06-09
Payer: COMMERCIAL

## 2020-06-09 ENCOUNTER — OFFICE VISIT (OUTPATIENT)
Dept: PHYSICAL THERAPY | Age: 60
End: 2020-06-09
Attending: FAMILY MEDICINE
Payer: COMMERCIAL

## 2020-06-09 PROCEDURE — 97140 MANUAL THERAPY 1/> REGIONS: CPT

## 2020-06-09 PROCEDURE — 97110 THERAPEUTIC EXERCISES: CPT

## 2020-06-09 NOTE — PROGRESS NOTES
Dx: R shoulder tendinitis         Insurance (Authorized # of Visits):  PPO           Authorizing Physician: Dr. Laurent Colunga MD visit: none scheduled  Fall Risk: standard         Precautions: n/a             Subjective: Pt reports since break in therapy du (sore)  UT stretch -held  sidelying AAROM   -flex 1x8  -abd 1x8  -ER 2x8 Therex  Pullies flex x2 min (good)  Wand AAROM supine  -ER x10 R  -flex x10 (~90 deg)  -standing scaption x10 (fair)  YTB rows 1x10  -low trap 1x10  -ERIC ER 1x10     Manual therapy

## 2020-06-11 ENCOUNTER — APPOINTMENT (OUTPATIENT)
Dept: PHYSICAL THERAPY | Age: 60
End: 2020-06-11
Payer: COMMERCIAL

## 2020-06-11 ENCOUNTER — OFFICE VISIT (OUTPATIENT)
Dept: PHYSICAL THERAPY | Age: 60
End: 2020-06-11
Attending: FAMILY MEDICINE
Payer: COMMERCIAL

## 2020-06-11 PROCEDURE — 97140 MANUAL THERAPY 1/> REGIONS: CPT

## 2020-06-11 PROCEDURE — 97110 THERAPEUTIC EXERCISES: CPT

## 2020-06-11 NOTE — PROGRESS NOTES
Dx: R shoulder tendinitis         Insurance (Authorized # of Visits):  PPO           Authorizing Physician: Dr. Bree Colunga MD visit: none scheduled  Fall Risk: standard         Precautions: n/a             Subjective: Pt reports that her shoulder is stil x10  UT stretch 3x10s R  sidelying AAROM   -flex 2x8  -abd 2x8  -ER 2x8 Therex  UBE retro x1 min (pain)  Pullies flex x1 min (good)  -abd x6 reps (pain)  YTB rows 2x10  -ext 1x10 (sore)  UT stretch -held  sidelying AAROM   -flex 1x8  -abd 1x8  -ER 2x8 Ther

## 2020-06-15 ENCOUNTER — OFFICE VISIT (OUTPATIENT)
Dept: PHYSICAL THERAPY | Age: 60
End: 2020-06-15
Attending: FAMILY MEDICINE
Payer: COMMERCIAL

## 2020-06-15 PROCEDURE — 97140 MANUAL THERAPY 1/> REGIONS: CPT

## 2020-06-15 PROCEDURE — 97110 THERAPEUTIC EXERCISES: CPT

## 2020-06-15 PROCEDURE — 97035 APP MDLTY 1+ULTRASOUND EA 15: CPT

## 2020-06-15 NOTE — PROGRESS NOTES
Dx: R shoulder tendinitis         Insurance (Authorized # of Visits):  PPO           Authorizing Physician: Dr. Valdovinos Prom  Next MD visit: none scheduled  Fall Risk: standard         Precautions: n/a             Subjective: Pt states her shoulder has been feel Therex  Seated table AAROM flex and ABD x10 ea  Scap retractions x10  UT stretch 3x10s R  sidelying AAROM   -flex 2x8  -abd 2x8  -ER 2x8 Therex  UBE retro x1 min (pain)  Pullies flex x1 min (good)  -abd x6 reps (pain)  YTB rows 2x10  -ext 1x10 (sore)  UT shoulder and neck x10 min  Modalities  US R upper trap origin x4 min; R bicep x4 min    HEP: seated table AAROM flex and ABD, scap retractions, UT stretch; YTB rows and ER    Charges:  Man x1, therex x1, US x1       Total Timed Treatment: 40 min  Total Torri

## 2020-06-16 ENCOUNTER — APPOINTMENT (OUTPATIENT)
Dept: PHYSICAL THERAPY | Age: 60
End: 2020-06-16
Payer: COMMERCIAL

## 2020-06-17 ENCOUNTER — OFFICE VISIT (OUTPATIENT)
Dept: PHYSICAL THERAPY | Age: 60
End: 2020-06-17
Attending: FAMILY MEDICINE
Payer: COMMERCIAL

## 2020-06-17 PROCEDURE — 97110 THERAPEUTIC EXERCISES: CPT

## 2020-06-17 PROCEDURE — 97035 APP MDLTY 1+ULTRASOUND EA 15: CPT

## 2020-06-17 PROCEDURE — 97140 MANUAL THERAPY 1/> REGIONS: CPT

## 2020-06-17 NOTE — PROGRESS NOTES
Dx: R shoulder tendinitis         Insurance (Authorized # of Visits):  PPO           Authorizing Physician: Dr. Marta Meigs  Next MD visit: none scheduled  Fall Risk: standard         Precautions: n/a             Subjective: Pt reports that she felt significant 6/11/2020             TX#: 5/12 Date: 6/15/2020  Tx#: 6/12 Date: 6/17/2020  Tx#: 7/12   Therex  Seated table AAROM flex and ABD x10 ea  Scap retractions x10  UT stretch 3x10s R  sidelying AAROM   -flex 2x8  -abd 2x8  -ER 2x8 Therex  UBE retro x1 min (pain) paraspinals, scalenes, UT x6 min   -UT pin and stretch x2 min R    STM R bicepsx6 min  Manual therapy   STM seated R cervical paraspinals, scalenes, UT x6 min   -UT pin and stretch x2 min R    STM R bicepsx6 min  R C2-6 rotation glides and upslope glides x

## 2020-06-18 ENCOUNTER — TELEPHONE (OUTPATIENT)
Dept: FAMILY MEDICINE CLINIC | Facility: CLINIC | Age: 60
End: 2020-06-18

## 2020-06-18 ENCOUNTER — APPOINTMENT (OUTPATIENT)
Dept: PHYSICAL THERAPY | Age: 60
End: 2020-06-18
Payer: COMMERCIAL

## 2020-06-18 NOTE — TELEPHONE ENCOUNTER
Per letter from surgeon, no labs or EKG required, however left voicemail with Tobi Gonsales in surgery scheduling asking for a return call to confirm this. Need to inform patient's  once we find out.

## 2020-06-18 NOTE — TELEPHONE ENCOUNTER
Patient scheduled for cataract surgery on 7/14. Will send pre-op requirement request to surgeon, patient's  would like to know if the patient will need fasting bloodwork for her presurgical visit. If so, he would like to change the time of the appt.

## 2020-06-24 ENCOUNTER — OFFICE VISIT (OUTPATIENT)
Dept: PHYSICAL THERAPY | Age: 60
End: 2020-06-24
Attending: FAMILY MEDICINE
Payer: COMMERCIAL

## 2020-06-24 PROCEDURE — 97140 MANUAL THERAPY 1/> REGIONS: CPT

## 2020-06-24 PROCEDURE — 97035 APP MDLTY 1+ULTRASOUND EA 15: CPT

## 2020-06-24 NOTE — PROGRESS NOTES
Dx: R shoulder tendinitis         Insurance (Authorized # of Visits):  PPO           Authorizing Physician: Dr. Ben Cannon  Next MD visit: none scheduled  Fall Risk: standard         Precautions: n/a             Subjective: Pt reports her shoulder feels good t TX#: 4/12 Date:  6/11/2020             TX#: 5/12 Date: 6/15/2020  Tx#: 6/12 Date: 6/17/2020  Tx#: 7/12 Date: 6/22/2020  Tx#: 8/12 Date: 6/24/2020  Tx#: 9/12   Therex  Pullies flex x2 min (good)  Wand AAROM supine  -ER x10 R  -flex x10 (~90 deg)  -standi x5 (sore; ok)    R C4-6 rotation glides Gr II 3x20s ea    kinesiotape R shoulder and scalene  Manual therapy   STM prone  R>L cervical paraspinals, scalenes, UT x12 min   -supine scalene MFR x2 min  -UT pin and stretch R 3x20s    Prone central and unilater

## 2020-06-29 ENCOUNTER — APPOINTMENT (OUTPATIENT)
Dept: PHYSICAL THERAPY | Age: 60
End: 2020-06-29
Attending: FAMILY MEDICINE
Payer: COMMERCIAL

## 2020-06-30 ENCOUNTER — OFFICE VISIT (OUTPATIENT)
Dept: PHYSICAL THERAPY | Age: 60
End: 2020-06-30
Attending: FAMILY MEDICINE
Payer: COMMERCIAL

## 2020-06-30 PROCEDURE — 97140 MANUAL THERAPY 1/> REGIONS: CPT

## 2020-06-30 PROCEDURE — 97110 THERAPEUTIC EXERCISES: CPT

## 2020-06-30 NOTE — PROGRESS NOTES
Dx: R shoulder tendinitis         Insurance (Authorized # of Visits):  PPO           Authorizing Physician: Dr. Nicole Man  Next MD visit: none scheduled  Fall Risk: standard         Precautions: n/a             Subjective: R shoulder continues to feel better 6/22/2020  Tx#: 8/12 Date: 6/24/2020  Tx#: 9/12 Date: 6/30/2020  Tx#: 10/12   Therex  Pullies flex x2 min   Wall towel AAROM   -flex x10   Corner pec stretch 3x30s  YTB   -low trap 1x10, 1x5  -ERIC ER 2x10  scap depression into SB 5s x10  Therex  Wall SB AA 3x20s ea   - - - - Neuro Re-ed  Deep neck flexor retraining 5s x10   Modalities  US R upper trap origin x4 min; R bicep x4 min  Modalities  US R upper trap origin x4 min; R bicep x4 min Modalities  US R upper trap/scalene origin x8 min Modalities  US R upp

## 2020-07-01 ENCOUNTER — APPOINTMENT (OUTPATIENT)
Dept: PHYSICAL THERAPY | Age: 60
End: 2020-07-01
Attending: FAMILY MEDICINE
Payer: COMMERCIAL

## 2020-07-06 ENCOUNTER — APPOINTMENT (OUTPATIENT)
Dept: PHYSICAL THERAPY | Age: 60
End: 2020-07-06
Attending: FAMILY MEDICINE
Payer: COMMERCIAL

## 2020-07-07 ENCOUNTER — OFFICE VISIT (OUTPATIENT)
Dept: FAMILY MEDICINE CLINIC | Facility: CLINIC | Age: 60
End: 2020-07-07
Payer: COMMERCIAL

## 2020-07-07 VITALS
HEART RATE: 75 BPM | SYSTOLIC BLOOD PRESSURE: 122 MMHG | WEIGHT: 189 LBS | BODY MASS INDEX: 36.15 KG/M2 | RESPIRATION RATE: 18 BRPM | HEIGHT: 60.75 IN | TEMPERATURE: 98 F | DIASTOLIC BLOOD PRESSURE: 78 MMHG | OXYGEN SATURATION: 98 %

## 2020-07-07 DIAGNOSIS — M47.892 OTHER OSTEOARTHRITIS OF SPINE, CERVICAL REGION: ICD-10-CM

## 2020-07-07 DIAGNOSIS — M17.12 PRIMARY OSTEOARTHRITIS OF LEFT KNEE: ICD-10-CM

## 2020-07-07 DIAGNOSIS — H25.813 COMBINED FORM OF AGE-RELATED CATARACT, BOTH EYES: Primary | ICD-10-CM

## 2020-07-07 DIAGNOSIS — Z01.818 PRE-OP EXAM: ICD-10-CM

## 2020-07-07 DIAGNOSIS — G89.29 OTHER CHRONIC PAIN: ICD-10-CM

## 2020-07-07 DIAGNOSIS — K21.9 GASTROESOPHAGEAL REFLUX DISEASE WITHOUT ESOPHAGITIS: ICD-10-CM

## 2020-07-07 DIAGNOSIS — E78.2 MIXED HYPERLIPIDEMIA: ICD-10-CM

## 2020-07-07 DIAGNOSIS — K76.0 FATTY LIVER: ICD-10-CM

## 2020-07-07 DIAGNOSIS — I10 ESSENTIAL HYPERTENSION: ICD-10-CM

## 2020-07-07 DIAGNOSIS — G47.33 OSA (OBSTRUCTIVE SLEEP APNEA): ICD-10-CM

## 2020-07-07 DIAGNOSIS — M77.8 RIGHT SHOULDER TENDINITIS: ICD-10-CM

## 2020-07-07 PROCEDURE — 93000 ELECTROCARDIOGRAM COMPLETE: CPT | Performed by: FAMILY MEDICINE

## 2020-07-07 PROCEDURE — 99243 OFF/OP CNSLTJ NEW/EST LOW 30: CPT | Performed by: FAMILY MEDICINE

## 2020-07-07 NOTE — PATIENT INSTRUCTIONS
Call [phone] to schedule fasting blood tests. Do not take Meloxicam until after cataract surgeries are done. Tylenol arthritis 1-2 tablets every 8 hours as needed for pain. Arthritis pains.

## 2020-07-07 NOTE — PROGRESS NOTES
Faiza Morse is a 63year old female. HPI:  Here for preop medical clearance/H&P requested by Dr. Telma Hernandez. Pt is scheduled for bilat cataract surgery, on L side 7/14/2020, then R side on 7/2020. will be done under IV sedation.   Having decreased vis (CRESTOR) 5 MG Oral Tab Take 1 tablet (5 mg total) by mouth nightly. 90 tablet 1   • omeprazole 20 MG Oral Capsule Delayed Release Take 1 capsule (20 mg total) by mouth every morning.  90 capsule 1   • Lisinopril-hydroCHLOROthiazide 10-12.5 MG Oral Tab Take 188 lb (85.3 kg)  07/30/19 : 188 lb (85.3 kg)    GENERAL: well developed, well nourished,in no apparent distress, pleasant affect  SKIN: no rashes,no suspicious lesions  HEENT: atraumatic, normocephalic,  Conj clear, EOMI  TMs:clear bilat  OMM, throat lester pain  Neurontin qhs helping. cpm  Hold meloxicam until surgery done. Avoid NSAIDs. Tylenol Arthrits q8hrs prn.

## 2020-07-08 ENCOUNTER — OFFICE VISIT (OUTPATIENT)
Dept: PHYSICAL THERAPY | Age: 60
End: 2020-07-08
Attending: FAMILY MEDICINE
Payer: COMMERCIAL

## 2020-07-08 ENCOUNTER — TELEPHONE (OUTPATIENT)
Dept: FAMILY MEDICINE CLINIC | Facility: CLINIC | Age: 60
End: 2020-07-08

## 2020-07-08 ENCOUNTER — APPOINTMENT (OUTPATIENT)
Dept: PHYSICAL THERAPY | Age: 60
End: 2020-07-08
Attending: FAMILY MEDICINE
Payer: COMMERCIAL

## 2020-07-08 DIAGNOSIS — R79.82 ELEVATED C-REACTIVE PROTEIN (CRP): ICD-10-CM

## 2020-07-08 DIAGNOSIS — M13.0 POLYARTHRITIS: ICD-10-CM

## 2020-07-08 DIAGNOSIS — Z00.00 LABORATORY EXAMINATION ORDERED AS PART OF A COMPLETE PHYSICAL EXAMINATION: Primary | ICD-10-CM

## 2020-07-08 DIAGNOSIS — R73.03 PREDIABETES: ICD-10-CM

## 2020-07-08 DIAGNOSIS — E78.2 MIXED HYPERLIPIDEMIA: ICD-10-CM

## 2020-07-08 PROCEDURE — 97140 MANUAL THERAPY 1/> REGIONS: CPT

## 2020-07-08 PROCEDURE — 97110 THERAPEUTIC EXERCISES: CPT

## 2020-07-08 NOTE — PROGRESS NOTES
Dx: R shoulder tendinitis         Insurance (Authorized # of Visits):  PPO           Authorizing Physician: Dr. Sabina Sanchez  Next MD visit: none scheduled  Fall Risk: standard         Precautions: n/a             Subjective: Shoulder continues to feel good.  Nec basket -progress  · Pt will improve neck pain and ROT AROM to 50 deg to be able to drive with less difficulty -part MET; able to rotate neck without pain   · Pt will be independent and compliant with comprehensive HEP to maintain progress achieved in PT -p STM prone  R>L cervical paraspinals, scalenes, UT x12 min   -supine scalene MFR x2 min  -UT pin and stretch R 3x20s    Prone central and unilateral R/L PA C2-6 Gr II x20s ea    Supine Cervical distraction 3x30s (feels good)    L>R C4-6 lateral glides Gr

## 2020-07-08 NOTE — TELEPHONE ENCOUNTER
Patients  said [facility] are going [facility] to have [facility] work done but the order is put in a edward and it needs to be switched to [facility] .

## 2020-07-09 ENCOUNTER — OFFICE VISIT (OUTPATIENT)
Dept: PHYSICAL THERAPY | Age: 60
End: 2020-07-09
Attending: FAMILY MEDICINE
Payer: COMMERCIAL

## 2020-07-09 PROBLEM — H25.813 COMBINED FORM OF AGE-RELATED CATARACT, BOTH EYES: Status: ACTIVE | Noted: 2020-07-09

## 2020-07-09 PROCEDURE — 97110 THERAPEUTIC EXERCISES: CPT

## 2020-07-09 PROCEDURE — 97140 MANUAL THERAPY 1/> REGIONS: CPT

## 2020-07-09 NOTE — PROGRESS NOTES
Tony  Pt has attended 12 visits in Physical Therapy.    Dx: R shoulder tendinitis         Insurance (Authorized # of Visits):  PPO           Authorizing Physician: Dr. Milena Colunga MD visit: none scheduled  Fall Risk: standard         Georgette Riley neck pain and ROT AROM to 50 deg to be able to drive with less difficulty -part MET; able to rotate neck without pain   · Pt will be independent and compliant with comprehensive HEP to maintain progress achieved in PT -MET    Plan: D/C neck/shoulder with c with PROM ROT L x5 (sore; ok)    R C4-6 rotation glides Gr II 3x20s ea    kinesiotape R shoulder and scalene  Manual therapy   STM prone  R>L cervical paraspinals, scalenes, UT x12 min   -supine scalene MFR x2 min  -UT pin and stretch R 3x20s    Prone cent

## 2020-07-11 LAB
ABSOLUTE BASOPHILS: 70 CELLS/UL (ref 0–200)
ABSOLUTE EOSINOPHILS: 240 CELLS/UL (ref 15–500)
ABSOLUTE LYMPHOCYTES: 3810 CELLS/UL (ref 850–3900)
ABSOLUTE MONOCYTES: 650 CELLS/UL (ref 200–950)
ABSOLUTE NEUTROPHILS: 5230 CELLS/UL (ref 1500–7800)
ALBUMIN/GLOBULIN RATIO: 1.4 (CALC) (ref 1–2.5)
ALBUMIN: 4.4 G/DL (ref 3.6–5.1)
ALKALINE PHOSPHATASE: 93 U/L (ref 37–153)
ALT: 23 U/L (ref 6–29)
AST: 22 U/L (ref 10–35)
BASOPHILS: 0.7 %
BILIRUBIN, TOTAL: 0.8 MG/DL (ref 0.2–1.2)
BUN: 18 MG/DL (ref 7–25)
C-REACTIVE PROTEIN: 19.9 MG/L
CALCIUM: 10 MG/DL (ref 8.6–10.4)
CARBON DIOXIDE: 30 MMOL/L (ref 20–32)
CHLORIDE: 100 MMOL/L (ref 98–110)
CHOL/HDLC RATIO: 3.2 (CALC)
CHOLESTEROL, TOTAL: 252 MG/DL
CREATININE: 0.68 MG/DL (ref 0.5–1.05)
EGFR IF AFRICN AM: 112 ML/MIN/1.73M2
EGFR IF NONAFRICN AM: 96 ML/MIN/1.73M2
EOSINOPHILS: 2.4 %
GLOBULIN: 3.1 G/DL (CALC) (ref 1.9–3.7)
GLUCOSE: 114 MG/DL (ref 65–99)
HDL CHOLESTEROL: 79 MG/DL
HEMATOCRIT: 38.1 % (ref 35–45)
HEMOGLOBIN A1C: 6.2 % OF TOTAL HGB
HEMOGLOBIN: 12.4 G/DL (ref 11.7–15.5)
LDL-CHOLESTEROL: 139 MG/DL (CALC)
LYMPHOCYTES: 38.1 %
MCH: 26.7 PG (ref 27–33)
MCHC: 32.5 G/DL (ref 32–36)
MCV: 82.1 FL (ref 80–100)
MONOCYTES: 6.5 %
MPV: 10.8 FL (ref 7.5–12.5)
NEUTROPHILS: 52.3 %
NON-HDL CHOLESTEROL: 173 MG/DL (CALC)
PLATELET COUNT: 305 THOUSAND/UL (ref 140–400)
POTASSIUM: 4.4 MMOL/L (ref 3.5–5.3)
PROTEIN, TOTAL: 7.5 G/DL (ref 6.1–8.1)
RDW: 13.4 % (ref 11–15)
RED BLOOD CELL COUNT: 4.64 MILLION/UL (ref 3.8–5.1)
SED RATE BY MODIFIED$WESTERGREN: 39 MM/H
SODIUM: 141 MMOL/L (ref 135–146)
TRIGLYCERIDES: 204 MG/DL
TSH W/REFLEX TO FT4: 2.55 MIU/L (ref 0.4–4.5)
WHITE BLOOD CELL COUNT: 10 THOUSAND/UL (ref 3.8–10.8)

## 2020-07-13 ENCOUNTER — MED REC SCAN ONLY (OUTPATIENT)
Dept: FAMILY MEDICINE CLINIC | Facility: CLINIC | Age: 60
End: 2020-07-13

## 2020-07-13 ENCOUNTER — APPOINTMENT (OUTPATIENT)
Dept: PHYSICAL THERAPY | Age: 60
End: 2020-07-13
Attending: FAMILY MEDICINE
Payer: COMMERCIAL

## 2020-07-15 ENCOUNTER — APPOINTMENT (OUTPATIENT)
Dept: PHYSICAL THERAPY | Age: 60
End: 2020-07-15
Attending: FAMILY MEDICINE
Payer: COMMERCIAL

## 2020-07-20 DIAGNOSIS — R79.82 ELEVATED C-REACTIVE PROTEIN (CRP): ICD-10-CM

## 2020-07-20 DIAGNOSIS — M25.50 ARTHRALGIA, UNSPECIFIED JOINT: Primary | ICD-10-CM

## 2020-07-20 DIAGNOSIS — R70.0 ELEVATED SED RATE (ELEV SR): ICD-10-CM

## 2020-07-29 ENCOUNTER — LAB ENCOUNTER (OUTPATIENT)
Dept: LAB | Facility: HOSPITAL | Age: 60
End: 2020-07-29
Attending: INTERNAL MEDICINE
Payer: COMMERCIAL

## 2020-07-29 DIAGNOSIS — Z01.818 PRE-OP TESTING: ICD-10-CM

## 2020-07-29 LAB — SARS-COV-2 RNA RESP QL NAA+PROBE: NOT DETECTED

## 2020-07-31 PROBLEM — Z12.11 SPECIAL SCREENING FOR MALIGNANT NEOPLASM OF COLON: Status: ACTIVE | Noted: 2020-07-31

## 2020-08-01 PROBLEM — D12.3 BENIGN NEOPLASM OF TRANSVERSE COLON: Status: ACTIVE | Noted: 2020-08-01

## 2020-08-01 PROBLEM — D12.5 BENIGN NEOPLASM OF SIGMOID COLON: Status: ACTIVE | Noted: 2020-08-01

## 2020-08-07 ENCOUNTER — TELEPHONE (OUTPATIENT)
Dept: FAMILY MEDICINE CLINIC | Facility: CLINIC | Age: 60
End: 2020-08-07

## 2020-08-07 NOTE — TELEPHONE ENCOUNTER
Patient's  returned call to r/s pt for medication and lab follow up. Dr. Luis Palacios. Schedule full for 2 weeks.    explained that it is very important for pt to be seen sooner only by Dr. Luis Palacios.    Would Dr. Luis Palacios be willing to fit patient in schedule next

## 2020-08-10 NOTE — TELEPHONE ENCOUNTER
Future Appointments   Date Time Provider Lilliana Afsaneh   8/12/2020 11:20 AM Rakesh Higgins MD [facility] 75TH   8/20/2020  2:00 PM Cierra Rizvi MD [facility] 808 RCK   9/21/2020  1:40 PM Francisco Saravia MD [facility]  SPAL   11/2/2020  2:15 PM

## 2020-08-12 ENCOUNTER — OFFICE VISIT (OUTPATIENT)
Dept: FAMILY MEDICINE CLINIC | Facility: CLINIC | Age: 60
End: 2020-08-12
Payer: COMMERCIAL

## 2020-08-12 VITALS
SYSTOLIC BLOOD PRESSURE: 126 MMHG | OXYGEN SATURATION: 99 % | BODY MASS INDEX: 33.13 KG/M2 | TEMPERATURE: 98 F | HEART RATE: 87 BPM | RESPIRATION RATE: 18 BRPM | WEIGHT: 187 LBS | DIASTOLIC BLOOD PRESSURE: 66 MMHG | HEIGHT: 63 IN

## 2020-08-12 DIAGNOSIS — J30.89 NON-SEASONAL ALLERGIC RHINITIS DUE TO OTHER ALLERGIC TRIGGER: ICD-10-CM

## 2020-08-12 DIAGNOSIS — M13.0 POLYARTHRITIS: ICD-10-CM

## 2020-08-12 DIAGNOSIS — E78.2 MIXED HYPERLIPIDEMIA: Primary | ICD-10-CM

## 2020-08-12 DIAGNOSIS — R51.9 GENERALIZED HEADACHES: ICD-10-CM

## 2020-08-12 DIAGNOSIS — R70.0 ELEVATED SED RATE: ICD-10-CM

## 2020-08-12 DIAGNOSIS — M77.8 RIGHT SHOULDER TENDINITIS: ICD-10-CM

## 2020-08-12 DIAGNOSIS — R73.03 PREDIABETES: ICD-10-CM

## 2020-08-12 DIAGNOSIS — M25.649 STIFFNESS OF HAND JOINT, UNSPECIFIED LATERALITY: ICD-10-CM

## 2020-08-12 DIAGNOSIS — D32.9 MENINGIOMA (HCC): ICD-10-CM

## 2020-08-12 DIAGNOSIS — Z51.81 ENCOUNTER FOR MEDICATION MONITORING: ICD-10-CM

## 2020-08-12 DIAGNOSIS — K76.0 FATTY LIVER: ICD-10-CM

## 2020-08-12 DIAGNOSIS — D12.6 TUBULAR ADENOMA OF COLON: ICD-10-CM

## 2020-08-12 DIAGNOSIS — G47.33 OSA (OBSTRUCTIVE SLEEP APNEA): ICD-10-CM

## 2020-08-12 DIAGNOSIS — R79.82 ELEVATED C-REACTIVE PROTEIN (CRP): ICD-10-CM

## 2020-08-12 DIAGNOSIS — I10 ESSENTIAL HYPERTENSION: ICD-10-CM

## 2020-08-12 PROCEDURE — 99215 OFFICE O/P EST HI 40 MIN: CPT | Performed by: FAMILY MEDICINE

## 2020-08-12 PROCEDURE — 3008F BODY MASS INDEX DOCD: CPT | Performed by: FAMILY MEDICINE

## 2020-08-12 PROCEDURE — 3074F SYST BP LT 130 MM HG: CPT | Performed by: FAMILY MEDICINE

## 2020-08-12 PROCEDURE — 3078F DIAST BP <80 MM HG: CPT | Performed by: FAMILY MEDICINE

## 2020-08-12 RX ORDER — METFORMIN HYDROCHLORIDE 500 MG/1
500 TABLET, EXTENDED RELEASE ORAL DAILY
Qty: 90 TABLET | Refills: 1 | Status: SHIPPED | OUTPATIENT
Start: 2020-08-12 | End: 2020-09-01

## 2020-08-12 RX ORDER — FLUTICASONE PROPIONATE 50 MCG
2 SPRAY, SUSPENSION (ML) NASAL DAILY
Qty: 16 G | Refills: 2 | Status: SHIPPED | OUTPATIENT
Start: 2020-08-12 | End: 2020-10-27 | Stop reason: ALTCHOICE

## 2020-08-12 RX ORDER — CETIRIZINE HYDROCHLORIDE 10 MG/1
10 TABLET ORAL DAILY
COMMUNITY
End: 2021-10-26

## 2020-08-12 RX ORDER — DIAZEPAM 5 MG/1
TABLET ORAL
Qty: 2 TABLET | Refills: 0 | Status: SHIPPED | OUTPATIENT
Start: 2020-08-12 | End: 2020-10-27 | Stop reason: ALTCHOICE

## 2020-08-12 NOTE — PROGRESS NOTES
Dottie Mata is a 63year old female. HPI:  Patient is here for follow-up on recent test results and medication. Had colonoscopy and noted to have 2 polyps, to have redone in 5 years. Had bilat cataract eye surgery and did well.   Vision improved  Got Nasal Suspension 2 sprays by Each Nare route daily. 16 g 2   • diazepam (VALIUM) 5 MG Oral Tab Take 1 tablet 1 hour prior to procedure, may repeat in 1 hour if needed. 2 tablet 0   • cholecalciferol 1000 UNITS Oral Cap Take 1,000 Units by mouth daily. Alcohol/week: 0.0 standard drinks    Drug use:  No                  REVIEW OF SYSTEMS:  GENERAL HEALTH: feels well otherwise, mood is good  SKIN: denies any unusual skin lesions or rashes  RESPIRATORY: denies shortness of breath with exertion, no cough  CAR elevated lipids. Discussed option to increase rosuvastatin dose to 10 mg daily. Patient prefers to avoid this. As she has now been taking 5 mg dose regularly, will plan to recheck labs in 2 months.   If no significant improvement, will consider abdifatah Propionate (FLONASE) 50 MCG/ACT Nasal Suspension; 2 sprays by Each Nare route daily. Dispense: 16 g; Refill: 2    8. Polyarthritis  9. Elevated sed rate  10. Elevated C-reactive protein (CRP)  11.  Stiffness of hand joint, unspecified laterality  Reviewed

## 2020-08-18 ENCOUNTER — HOSPITAL ENCOUNTER (OUTPATIENT)
Dept: MRI IMAGING | Age: 60
Discharge: HOME | End: 2020-08-18
Attending: FAMILY MEDICINE
Payer: COMMERCIAL

## 2020-08-18 DIAGNOSIS — R51.9 GENERALIZED HEADACHES: ICD-10-CM

## 2020-08-18 DIAGNOSIS — D32.9 MENINGIOMA (HCC): ICD-10-CM

## 2020-08-18 PROCEDURE — 70553 MRI BRAIN STEM W/O & W/DYE: CPT | Performed by: FAMILY MEDICINE

## 2020-08-18 PROCEDURE — A9575 INJ GADOTERATE MEGLUMI 0.1ML: HCPCS | Performed by: FAMILY MEDICINE

## 2020-08-19 ENCOUNTER — TELEPHONE (OUTPATIENT)
Dept: FAMILY MEDICINE CLINIC | Facility: CLINIC | Age: 60
End: 2020-08-19

## 2020-08-20 DIAGNOSIS — K21.9 CHRONIC GERD: ICD-10-CM

## 2020-08-20 DIAGNOSIS — E78.2 MIXED HYPERLIPIDEMIA: ICD-10-CM

## 2020-08-20 DIAGNOSIS — I10 ESSENTIAL HYPERTENSION: ICD-10-CM

## 2020-08-20 RX ORDER — ROSUVASTATIN CALCIUM 5 MG/1
5 TABLET, COATED ORAL NIGHTLY
Qty: 90 TABLET | Refills: 1 | Status: ON HOLD | OUTPATIENT
Start: 2020-08-20 | End: 2020-12-18

## 2020-08-20 RX ORDER — OMEPRAZOLE 20 MG/1
20 CAPSULE, DELAYED RELEASE ORAL EVERY MORNING
Qty: 90 CAPSULE | Refills: 1 | Status: ON HOLD | OUTPATIENT
Start: 2020-08-20 | End: 2020-12-18

## 2020-08-20 RX ORDER — LISINOPRIL AND HYDROCHLOROTHIAZIDE 12.5; 1 MG/1; MG/1
1 TABLET ORAL
Qty: 90 TABLET | Refills: 1 | Status: SHIPPED | OUTPATIENT
Start: 2020-08-20 | End: 2020-09-01

## 2020-08-20 NOTE — TELEPHONE ENCOUNTER
LOV 8/12/2020    LAST LAB    LAST RX All meds last refilled 2-19-20 90*1    Next OV   Future Appointments   Date Time Provider Lilliana Sanchezi   8/20/2020  2:00 PM Martina Wilde MD [place] [id number] [facility]   8/28/2020  9:20 AM Parul Alvarez MD EM[facility] 21 EMG Rx #: [id number]    Hypertension Medications Protocol Passed8/20 7:22 AM   CMP or BMP in past 12 months    Last serum creatinine< 2.0    Appointment in past 6 or next 3 months

## 2020-08-20 NOTE — TELEPHONE ENCOUNTER
Called and spoke with pt's , Aurelio Michaels (Abran Xiao per HIPAA), to inform per PCP indicated MRI shows findings of meningioma, stable compared to previous MRI from 2016. Clinically benign (not concerning).  There are findings of some changes in the small blood ve

## 2020-08-20 NOTE — TELEPHONE ENCOUNTER
Jessica Westbrook MD  8/20/2020  8:18 AM      MRI shows findings of meningioma, stable compared to previous MRI from 2016.  Clinically benign (not concerning).  There are findings of some changes in the small blood vessels due to age, blood pressure and ch

## 2020-08-28 ENCOUNTER — TELEPHONE (OUTPATIENT)
Dept: FAMILY MEDICINE CLINIC | Facility: CLINIC | Age: 60
End: 2020-08-28

## 2020-09-01 DIAGNOSIS — R73.03 PREDIABETES: ICD-10-CM

## 2020-09-01 DIAGNOSIS — I10 ESSENTIAL HYPERTENSION: ICD-10-CM

## 2020-09-01 RX ORDER — LISINOPRIL AND HYDROCHLOROTHIAZIDE 12.5; 1 MG/1; MG/1
1 TABLET ORAL
Qty: 30 TABLET | Refills: 0 | Status: SHIPPED | OUTPATIENT
Start: 2020-09-01 | End: 2020-11-03

## 2020-09-01 RX ORDER — METFORMIN HYDROCHLORIDE 500 MG/1
500 TABLET, EXTENDED RELEASE ORAL DAILY
Qty: 30 TABLET | Refills: 0 | Status: SHIPPED | OUTPATIENT
Start: 2020-09-01 | End: 2020-11-03

## 2020-09-01 NOTE — TELEPHONE ENCOUNTER
LOV 8/12/20  Labs ordered 8/12/20 - not completed  Future Appointments   Date Time Provider Lilliana Shelby   9/8/2020  1:20 PM Patricia Swann MD [facility] 75[facility]       Noted we had sent #90 for Lisinopril- HCTZ and Metformin.  Contacted Ab fleming

## 2020-09-01 NOTE — TELEPHONE ENCOUNTER
Patient's  stated pt lost her medication after picking up from pharmacy.  is requesting refills on Metformin and LISINOPRIL    Pt has MRI  F/U on 9/8.

## 2020-09-02 ENCOUNTER — TELEPHONE (OUTPATIENT)
Dept: FAMILY MEDICINE CLINIC | Facility: CLINIC | Age: 60
End: 2020-09-02

## 2020-09-02 NOTE — TELEPHONE ENCOUNTER
Patient's daughter stated that pt is going out of town and will need 3 month supply of 3 medications:    Lisiniprol  <etformin  And Rosuvastatin    Would Dr. Brannon Starr please refill?

## 2020-09-02 NOTE — TELEPHONE ENCOUNTER
Called and spoke with pt's [facility] to inform we have refilled these meds initially for 90 day supply, however, we had confirmed with pharmacy based on pt's plan insurance will cover to dispense only #30 at a time.  Furthermore, sin

## 2020-11-03 ENCOUNTER — OFFICE VISIT (OUTPATIENT)
Dept: FAMILY MEDICINE CLINIC | Facility: CLINIC | Age: 60
End: 2020-11-03
Payer: COMMERCIAL

## 2020-11-03 VITALS
TEMPERATURE: 97 F | RESPIRATION RATE: 16 BRPM | HEIGHT: 63 IN | OXYGEN SATURATION: 98 % | WEIGHT: 187 LBS | DIASTOLIC BLOOD PRESSURE: 62 MMHG | SYSTOLIC BLOOD PRESSURE: 124 MMHG | HEART RATE: 83 BPM | BODY MASS INDEX: 33.13 KG/M2

## 2020-11-03 DIAGNOSIS — Z51.81 ENCOUNTER FOR MEDICATION MONITORING: ICD-10-CM

## 2020-11-03 DIAGNOSIS — E78.2 MIXED HYPERLIPIDEMIA: ICD-10-CM

## 2020-11-03 DIAGNOSIS — Z99.89 OSA ON CPAP: ICD-10-CM

## 2020-11-03 DIAGNOSIS — G47.33 OSA ON CPAP: ICD-10-CM

## 2020-11-03 DIAGNOSIS — I10 ESSENTIAL HYPERTENSION: Primary | ICD-10-CM

## 2020-11-03 DIAGNOSIS — R70.0 ELEVATED SED RATE: ICD-10-CM

## 2020-11-03 DIAGNOSIS — N39.41 URGE INCONTINENCE OF URINE: ICD-10-CM

## 2020-11-03 DIAGNOSIS — R79.82 CRP ELEVATED: ICD-10-CM

## 2020-11-03 DIAGNOSIS — K21.9 GASTROESOPHAGEAL REFLUX DISEASE WITHOUT ESOPHAGITIS: ICD-10-CM

## 2020-11-03 DIAGNOSIS — R73.03 PREDIABETES: ICD-10-CM

## 2020-11-03 DIAGNOSIS — M25.50 POLYARTHRALGIA: ICD-10-CM

## 2020-11-03 DIAGNOSIS — R35.89 POLYURIA: ICD-10-CM

## 2020-11-03 PROCEDURE — 3078F DIAST BP <80 MM HG: CPT | Performed by: FAMILY MEDICINE

## 2020-11-03 PROCEDURE — 87086 URINE CULTURE/COLONY COUNT: CPT | Performed by: FAMILY MEDICINE

## 2020-11-03 PROCEDURE — 3008F BODY MASS INDEX DOCD: CPT | Performed by: FAMILY MEDICINE

## 2020-11-03 PROCEDURE — 81003 URINALYSIS AUTO W/O SCOPE: CPT | Performed by: FAMILY MEDICINE

## 2020-11-03 PROCEDURE — 99214 OFFICE O/P EST MOD 30 MIN: CPT | Performed by: FAMILY MEDICINE

## 2020-11-03 PROCEDURE — 3074F SYST BP LT 130 MM HG: CPT | Performed by: FAMILY MEDICINE

## 2020-11-03 RX ORDER — LISINOPRIL AND HYDROCHLOROTHIAZIDE 12.5; 1 MG/1; MG/1
1 TABLET ORAL
Qty: 90 TABLET | Refills: 1 | Status: SHIPPED | OUTPATIENT
Start: 2020-11-03 | End: 2021-06-17

## 2020-11-03 RX ORDER — METFORMIN HYDROCHLORIDE 500 MG/1
500 TABLET, EXTENDED RELEASE ORAL DAILY
Qty: 90 TABLET | Refills: 1 | Status: SHIPPED | OUTPATIENT
Start: 2020-11-03 | End: 2021-08-31

## 2020-11-03 NOTE — PROGRESS NOTES
Ashutosh Suarez is a 63year old female. HPI:  pt is here for follow-up on medication and labs. Doing well overall  Notes some polyuria and urge incontinence for about 2 weeks  Has been trying to drink more water.   No dysuria  No f/c  No back/flank pain 2020   • COLONOSCOPY  08/01/2020    Tubular Adenomas, recheck 5 years   • CONTRACEPT IUD     • CYST ASPIRATION RIGHT      Breast Lesion - benign cyst   • LEFT PHACOEMULSIFICATION OF CATARACT WITH INTRAOCULAR LENS IMPLANT [id number] Left 7/14/2020    Performed b hypertension  Blood pressure controlled. Labs with normal electrolytes creatinine. CPM.  - Lisinopril-hydroCHLOROthiazide 10-12.5 MG Oral Tab; Take 1 tablet by mouth once daily. Dispense: 90 tablet; Refill: 1    2.  Prediabetes  Labs with fasting blood bhatt compared to previous with some therapeutic lifestyle changes and regular use of statin. LFTs normal.  Continue current dose of statin. Advised patient to restart vitamin D3 supplement, 1000 units daily. Advised flu shot.   Patient will follow up for nu

## 2020-11-05 PROBLEM — Z99.89 OSA ON CPAP: Status: ACTIVE | Noted: 2019-02-28

## 2020-11-05 PROBLEM — G47.33 OSA ON CPAP: Status: ACTIVE | Noted: 2019-02-28

## 2020-11-17 ENCOUNTER — TELEPHONE (OUTPATIENT)
Dept: FAMILY MEDICINE CLINIC | Facility: CLINIC | Age: 60
End: 2020-11-17

## 2020-11-17 NOTE — TELEPHONE ENCOUNTER
Pt's  called to schedule pre op appt with Dr. Richy Aleman. Scheduled pt for 11/30/2020 at 11am. Pre op paperwork is in Dr. Rebecca Rain pending pre op folder back by MA station.

## 2020-11-23 NOTE — H&P
[facility]    PATIENT'S NAME: [facility]   ATTENDING PHYSICIAN: Danni See M.D.    PATIENT ACCOUNT#:   [de-identified]    LOCATION:    MEDICAL RECORD #:   [id number]       YOB: 1960  ADMISSION DATE:       12/18/2020    HISTORY AND P neoplasm of the sigmoid colon, benign neoplasm of the transverse colon, fatty liver, esophageal reflux, hypertension, lumbago, neck sprain, vertigo, vitamin D deficiency.     PAST SURGICAL HISTORY:  Colonoscopy, bilateral cataract extraction 2020, IUD, aspi Judith Bolden M.D.  d: 11/22/2020 10:01:13  t: 11/22/2020 13:01:17  [facility] [id number]  [facility]/    cc: JOSE Escobar M.D. Thayne Horns, MD

## 2020-11-30 ENCOUNTER — OFFICE VISIT (OUTPATIENT)
Dept: FAMILY MEDICINE CLINIC | Facility: CLINIC | Age: 60
End: 2020-11-30
Payer: COMMERCIAL

## 2020-11-30 VITALS
DIASTOLIC BLOOD PRESSURE: 62 MMHG | BODY MASS INDEX: 32.96 KG/M2 | WEIGHT: 186 LBS | HEIGHT: 63 IN | RESPIRATION RATE: 16 BRPM | SYSTOLIC BLOOD PRESSURE: 124 MMHG | TEMPERATURE: 97 F | HEART RATE: 86 BPM | OXYGEN SATURATION: 98 %

## 2020-11-30 DIAGNOSIS — M05.9 SEROPOSITIVE RHEUMATOID ARTHRITIS (HCC): ICD-10-CM

## 2020-11-30 DIAGNOSIS — I10 ESSENTIAL HYPERTENSION: ICD-10-CM

## 2020-11-30 DIAGNOSIS — K21.9 GASTROESOPHAGEAL REFLUX DISEASE WITHOUT ESOPHAGITIS: ICD-10-CM

## 2020-11-30 DIAGNOSIS — E78.2 MIXED HYPERLIPIDEMIA: ICD-10-CM

## 2020-11-30 DIAGNOSIS — Z23 NEED FOR VACCINATION: ICD-10-CM

## 2020-11-30 DIAGNOSIS — Z01.818 PRE-OP EXAMINATION: ICD-10-CM

## 2020-11-30 DIAGNOSIS — G47.33 OSA ON CPAP: ICD-10-CM

## 2020-11-30 DIAGNOSIS — M17.12 PRIMARY OSTEOARTHRITIS OF LEFT KNEE: Primary | ICD-10-CM

## 2020-11-30 DIAGNOSIS — Z99.89 OSA ON CPAP: ICD-10-CM

## 2020-11-30 DIAGNOSIS — R73.03 PREDIABETES: ICD-10-CM

## 2020-11-30 PROBLEM — H25.813 COMBINED FORM OF AGE-RELATED CATARACT, BOTH EYES: Status: RESOLVED | Noted: 2020-07-09 | Resolved: 2020-11-30

## 2020-11-30 PROBLEM — Z12.11 SPECIAL SCREENING FOR MALIGNANT NEOPLASM OF COLON: Status: RESOLVED | Noted: 2020-07-31 | Resolved: 2020-11-30

## 2020-11-30 PROCEDURE — 90471 IMMUNIZATION ADMIN: CPT | Performed by: FAMILY MEDICINE

## 2020-11-30 PROCEDURE — 3074F SYST BP LT 130 MM HG: CPT | Performed by: FAMILY MEDICINE

## 2020-11-30 PROCEDURE — 90686 IIV4 VACC NO PRSV 0.5 ML IM: CPT | Performed by: FAMILY MEDICINE

## 2020-11-30 PROCEDURE — 93000 ELECTROCARDIOGRAM COMPLETE: CPT | Performed by: FAMILY MEDICINE

## 2020-11-30 PROCEDURE — 99243 OFF/OP CNSLTJ NEW/EST LOW 30: CPT | Performed by: FAMILY MEDICINE

## 2020-11-30 PROCEDURE — 3078F DIAST BP <80 MM HG: CPT | Performed by: FAMILY MEDICINE

## 2020-11-30 PROCEDURE — 3008F BODY MASS INDEX DOCD: CPT | Performed by: FAMILY MEDICINE

## 2020-11-30 NOTE — H&P
Ash Storm is a 63year old female. HPI:  Pt is here for pre-op medical clearance requested per Dr. Marci Zacarias.  Pt is scheduled for L total knee replacement on 12/18/2020 at [facility].   Pt having continued L knee pain and swelling due to OA, which Sleep apnea    • Sprain of neck    • Unspecified essential hypertension    • Unspecified essential hypertension    • Vertigo    • Vitamin D deficiency        Past Surgical History:   Procedure Laterality Date   • CATARACT Bilateral 2020   • COLONOSCOPY  08 EOMI, PERRL  TMs:clear bilat  OMM, throat clear  NECK: supple,no adenopathy,noTM  LUNGS: clear to auscultation  CARDIO: RRR without murmur  GI: NABS, soft,obese, no tenderness, no masses, no HSM, ND  EXTREMITIES: no cyanosis, clubbing or edema  Bilateral k

## 2020-12-01 ENCOUNTER — TELEPHONE (OUTPATIENT)
Dept: FAMILY MEDICINE CLINIC | Facility: CLINIC | Age: 60
End: 2020-12-01

## 2020-12-01 NOTE — TELEPHONE ENCOUNTER
Pt is having knee surgery on 12/18 seen  yesterday and said she was told to stop taking some of her medications prior to surgery but she forgot which meds to adjust?

## 2020-12-02 NOTE — TELEPHONE ENCOUNTER
Called and spoke to patient's . States he was the one who called yesterday. Reviewed the medications to hold documented in Dr. Ashley Fatima pre-op note. 11/30/2020:  No aspirin/NSAIDs/OTC vitamins/supplements starting 1 week prior to surgery.   Alivia Mittal

## 2020-12-04 ENCOUNTER — TELEPHONE (OUTPATIENT)
Dept: FAMILY MEDICINE CLINIC | Facility: CLINIC | Age: 60
End: 2020-12-04

## 2020-12-04 RX ORDER — FERROUS SULFATE 325(65) MG
325 TABLET ORAL
Qty: 30 TABLET | Refills: 1 | Status: SHIPPED | OUTPATIENT
Start: 2020-12-04 | End: 2020-12-28

## 2020-12-04 NOTE — TELEPHONE ENCOUNTER
Result note sent to patient. Preop labs all okay except mild anemia. May be due to being on Plaquenil now. Recommend iron supplement once daily. Prescription sent to pharmacy. Also noted patient needs to get MSSA/MRSA culture screen done for preop.   Ashu Haddad

## 2020-12-07 NOTE — TELEPHONE ENCOUNTER
Called and spoke to patient's . Informed there are three different tests to be done at the lab appt on 12/15/2020. They should to a swab for Covid, a swab for MRSA and a blood test.  Instructed to make sure the lab is aware of all three tests.   Trino

## 2020-12-09 PROBLEM — M25.562 ACUTE PAIN OF LEFT KNEE: Status: ACTIVE | Noted: 2020-12-09

## 2020-12-15 ENCOUNTER — LAB ENCOUNTER (OUTPATIENT)
Dept: LAB | Age: 60
End: 2020-12-15
Attending: ORTHOPAEDIC SURGERY
Payer: COMMERCIAL

## 2020-12-15 DIAGNOSIS — Z01.818 PRE-OP EXAMINATION: ICD-10-CM

## 2020-12-15 DIAGNOSIS — M17.12 PRIMARY OSTEOARTHRITIS OF LEFT KNEE: ICD-10-CM

## 2020-12-15 PROCEDURE — 86900 BLOOD TYPING SEROLOGIC ABO: CPT

## 2020-12-15 PROCEDURE — 87081 CULTURE SCREEN ONLY: CPT

## 2020-12-15 PROCEDURE — 86850 RBC ANTIBODY SCREEN: CPT

## 2020-12-15 PROCEDURE — 86901 BLOOD TYPING SEROLOGIC RH(D): CPT

## 2020-12-17 RX ORDER — HYDROCODONE BITARTRATE AND ACETAMINOPHEN 10; 325 MG/1; MG/1
1-2 TABLET ORAL EVERY 6 HOURS PRN
Qty: 40 TABLET | Refills: 0 | Status: SHIPPED | OUTPATIENT
Start: 2020-12-18 | End: 2020-12-28

## 2020-12-17 RX ORDER — HYDROCODONE BITARTRATE AND ACETAMINOPHEN 10; 325 MG/1; MG/1
1-2 TABLET ORAL EVERY 6 HOURS PRN
Qty: 40 TABLET | Refills: 0 | Status: SHIPPED | OUTPATIENT
Start: 2020-12-18 | End: 2020-12-17

## 2020-12-18 ENCOUNTER — ANESTHESIA EVENT (OUTPATIENT)
Dept: SURGERY | Facility: HOSPITAL | Age: 60
End: 2020-12-18
Payer: COMMERCIAL

## 2020-12-18 ENCOUNTER — APPOINTMENT (OUTPATIENT)
Dept: GENERAL RADIOLOGY | Facility: HOSPITAL | Age: 60
End: 2020-12-18
Attending: ORTHOPAEDIC SURGERY
Payer: COMMERCIAL

## 2020-12-18 ENCOUNTER — ANESTHESIA (OUTPATIENT)
Dept: SURGERY | Facility: HOSPITAL | Age: 60
End: 2020-12-18
Payer: COMMERCIAL

## 2020-12-18 ENCOUNTER — HOSPITAL ENCOUNTER (OUTPATIENT)
Facility: HOSPITAL | Age: 60
Discharge: HOME HEALTH | End: 2020-12-20
Attending: ORTHOPAEDIC SURGERY | Admitting: ORTHOPAEDIC SURGERY
Payer: COMMERCIAL

## 2020-12-18 DIAGNOSIS — M17.12 PRIMARY OSTEOARTHRITIS OF LEFT KNEE: Primary | ICD-10-CM

## 2020-12-18 PROBLEM — Z47.89 ORTHOPEDIC AFTERCARE: Status: ACTIVE | Noted: 2020-12-18

## 2020-12-18 PROCEDURE — 73560 X-RAY EXAM OF KNEE 1 OR 2: CPT | Performed by: ORTHOPAEDIC SURGERY

## 2020-12-18 PROCEDURE — 0SRD069 REPLACEMENT OF LEFT KNEE JOINT WITH OXIDIZED ZIRCONIUM ON POLYETHYLENE SYNTHETIC SUBSTITUTE, CEMENTED, OPEN APPROACH: ICD-10-PCS | Performed by: ORTHOPAEDIC SURGERY

## 2020-12-18 PROCEDURE — 3E0T3BZ INTRODUCTION OF ANESTHETIC AGENT INTO PERIPHERAL NERVES AND PLEXI, PERCUTANEOUS APPROACH: ICD-10-PCS | Performed by: ANESTHESIOLOGY

## 2020-12-18 PROCEDURE — 76942 ECHO GUIDE FOR BIOPSY: CPT | Performed by: ANESTHESIOLOGY

## 2020-12-18 PROCEDURE — 99204 OFFICE O/P NEW MOD 45 MIN: CPT | Performed by: HOSPITALIST

## 2020-12-18 PROCEDURE — 3E0T33Z INTRODUCTION OF ANTI-INFLAMMATORY INTO PERIPHERAL NERVES AND PLEXI, PERCUTANEOUS APPROACH: ICD-10-PCS | Performed by: ANESTHESIOLOGY

## 2020-12-18 DEVICE — PSNA CM FM/CM TB/CPS VE SUR/ST: Type: IMPLANTABLE DEVICE | Status: FUNCTIONAL

## 2020-12-18 DEVICE — PSN ALL POLY PAT PLY 32MM: Type: IMPLANTABLE DEVICE | Site: KNEE | Status: FUNCTIONAL

## 2020-12-18 DEVICE — BIOMET BC R 1X40 US: Type: IMPLANTABLE DEVICE | Site: KNEE | Status: FUNCTIONAL

## 2020-12-18 DEVICE — PSN FEM PS CMT CCR STD SZ5 L: Type: IMPLANTABLE DEVICE | Site: KNEE | Status: FUNCTIONAL

## 2020-12-18 DEVICE — PSN STR HYB ST 14X+30 M: Type: IMPLANTABLE DEVICE | Site: KNEE | Status: FUNCTIONAL

## 2020-12-18 DEVICE — PSN TIB STM 5 DEG SZ D L: Type: IMPLANTABLE DEVICE | Site: KNEE | Status: FUNCTIONAL

## 2020-12-18 RX ORDER — CLONIDINE 100 UG/ML
INJECTION, SOLUTION EPIDURAL AS NEEDED
Status: DISCONTINUED | OUTPATIENT
Start: 2020-12-18 | End: 2020-12-18 | Stop reason: SURG

## 2020-12-18 RX ORDER — OXYCODONE HYDROCHLORIDE 5 MG/1
5 TABLET ORAL EVERY 4 HOURS PRN
Status: DISPENSED | OUTPATIENT
Start: 2020-12-18 | End: 2020-12-20

## 2020-12-18 RX ORDER — LISINOPRIL AND HYDROCHLOROTHIAZIDE 12.5; 1 MG/1; MG/1
1 TABLET ORAL
Status: DISCONTINUED | OUTPATIENT
Start: 2020-12-18 | End: 2020-12-18 | Stop reason: SDUPTHER

## 2020-12-18 RX ORDER — SODIUM CHLORIDE, SODIUM LACTATE, POTASSIUM CHLORIDE, CALCIUM CHLORIDE 600; 310; 30; 20 MG/100ML; MG/100ML; MG/100ML; MG/100ML
INJECTION, SOLUTION INTRAVENOUS CONTINUOUS
Status: DISCONTINUED | OUTPATIENT
Start: 2020-12-18 | End: 2020-12-18 | Stop reason: HOSPADM

## 2020-12-18 RX ORDER — ONDANSETRON 2 MG/ML
4 INJECTION INTRAMUSCULAR; INTRAVENOUS EVERY 4 HOURS PRN
Status: ACTIVE | OUTPATIENT
Start: 2020-12-18 | End: 2020-12-20

## 2020-12-18 RX ORDER — HYDROMORPHONE HYDROCHLORIDE 1 MG/ML
0.4 INJECTION, SOLUTION INTRAMUSCULAR; INTRAVENOUS; SUBCUTANEOUS EVERY 5 MIN PRN
Status: DISCONTINUED | OUTPATIENT
Start: 2020-12-18 | End: 2020-12-18 | Stop reason: HOSPADM

## 2020-12-18 RX ORDER — MIDAZOLAM HYDROCHLORIDE 1 MG/ML
INJECTION INTRAMUSCULAR; INTRAVENOUS AS NEEDED
Status: DISCONTINUED | OUTPATIENT
Start: 2020-12-18 | End: 2020-12-18 | Stop reason: SURG

## 2020-12-18 RX ORDER — KETOROLAC TROMETHAMINE 30 MG/ML
30 INJECTION, SOLUTION INTRAMUSCULAR; INTRAVENOUS EVERY 6 HOURS
Status: COMPLETED | OUTPATIENT
Start: 2020-12-18 | End: 2020-12-19

## 2020-12-18 RX ORDER — DIPHENHYDRAMINE HYDROCHLORIDE 50 MG/ML
12.5 INJECTION INTRAMUSCULAR; INTRAVENOUS EVERY 4 HOURS PRN
Status: DISCONTINUED | OUTPATIENT
Start: 2020-12-18 | End: 2020-12-20

## 2020-12-18 RX ORDER — HYDROMORPHONE HYDROCHLORIDE 1 MG/ML
0.2 INJECTION, SOLUTION INTRAMUSCULAR; INTRAVENOUS; SUBCUTANEOUS EVERY 2 HOUR PRN
Status: ACTIVE | OUTPATIENT
Start: 2020-12-18 | End: 2020-12-20

## 2020-12-18 RX ORDER — BISACODYL 10 MG
10 SUPPOSITORY, RECTAL RECTAL
Status: DISCONTINUED | OUTPATIENT
Start: 2020-12-18 | End: 2020-12-20

## 2020-12-18 RX ORDER — PANTOPRAZOLE SODIUM 20 MG/1
20 TABLET, DELAYED RELEASE ORAL
Status: DISCONTINUED | OUTPATIENT
Start: 2020-12-19 | End: 2020-12-20

## 2020-12-18 RX ORDER — POLYETHYLENE GLYCOL 3350 17 G/17G
17 POWDER, FOR SOLUTION ORAL DAILY PRN
Status: DISCONTINUED | OUTPATIENT
Start: 2020-12-18 | End: 2020-12-20

## 2020-12-18 RX ORDER — ROSUVASTATIN CALCIUM 5 MG/1
5 TABLET, COATED ORAL NIGHTLY
COMMUNITY
End: 2021-09-26

## 2020-12-18 RX ORDER — HYDROMORPHONE HYDROCHLORIDE 1 MG/ML
INJECTION, SOLUTION INTRAMUSCULAR; INTRAVENOUS; SUBCUTANEOUS
Status: COMPLETED
Start: 2020-12-18 | End: 2020-12-18

## 2020-12-18 RX ORDER — SODIUM PHOSPHATE, DIBASIC AND SODIUM PHOSPHATE, MONOBASIC 7; 19 G/133ML; G/133ML
1 ENEMA RECTAL ONCE AS NEEDED
Status: DISCONTINUED | OUTPATIENT
Start: 2020-12-18 | End: 2020-12-20

## 2020-12-18 RX ORDER — SCOLOPAMINE TRANSDERMAL SYSTEM 1 MG/1
1 PATCH, EXTENDED RELEASE TRANSDERMAL ONCE
Status: DISCONTINUED | OUTPATIENT
Start: 2020-12-18 | End: 2020-12-20

## 2020-12-18 RX ORDER — DEXTROSE MONOHYDRATE 25 G/50ML
50 INJECTION, SOLUTION INTRAVENOUS
Status: DISCONTINUED | OUTPATIENT
Start: 2020-12-18 | End: 2020-12-20

## 2020-12-18 RX ORDER — DIPHENHYDRAMINE HYDROCHLORIDE 50 MG/ML
25 INJECTION INTRAMUSCULAR; INTRAVENOUS ONCE AS NEEDED
Status: ACTIVE | OUTPATIENT
Start: 2020-12-18 | End: 2020-12-18

## 2020-12-18 RX ORDER — SODIUM CHLORIDE, SODIUM LACTATE, POTASSIUM CHLORIDE, CALCIUM CHLORIDE 600; 310; 30; 20 MG/100ML; MG/100ML; MG/100ML; MG/100ML
INJECTION, SOLUTION INTRAVENOUS CONTINUOUS
Status: DISCONTINUED | OUTPATIENT
Start: 2020-12-18 | End: 2020-12-20

## 2020-12-18 RX ORDER — SENNOSIDES 8.6 MG
17.2 TABLET ORAL NIGHTLY
Status: DISCONTINUED | OUTPATIENT
Start: 2020-12-18 | End: 2020-12-20

## 2020-12-18 RX ORDER — BUPRENORPHINE HYDROCHLORIDE 0.32 MG/ML
INJECTION INTRAMUSCULAR; INTRAVENOUS AS NEEDED
Status: DISCONTINUED | OUTPATIENT
Start: 2020-12-18 | End: 2020-12-18 | Stop reason: SURG

## 2020-12-18 RX ORDER — DIPHENHYDRAMINE HCL 25 MG
25 CAPSULE ORAL EVERY 4 HOURS PRN
Status: DISCONTINUED | OUTPATIENT
Start: 2020-12-18 | End: 2020-12-20

## 2020-12-18 RX ORDER — CEFAZOLIN SODIUM/WATER 2 G/20 ML
2 SYRINGE (ML) INTRAVENOUS ONCE
Status: COMPLETED | OUTPATIENT
Start: 2020-12-18 | End: 2020-12-18

## 2020-12-18 RX ORDER — ONDANSETRON 2 MG/ML
INJECTION INTRAMUSCULAR; INTRAVENOUS AS NEEDED
Status: DISCONTINUED | OUTPATIENT
Start: 2020-12-18 | End: 2020-12-18 | Stop reason: SURG

## 2020-12-18 RX ORDER — ACETAMINOPHEN 325 MG/1
TABLET ORAL
Status: COMPLETED
Start: 2020-12-18 | End: 2020-12-18

## 2020-12-18 RX ORDER — DIPHENHYDRAMINE HYDROCHLORIDE 50 MG/ML
12.5 INJECTION INTRAMUSCULAR; INTRAVENOUS AS NEEDED
Status: DISCONTINUED | OUTPATIENT
Start: 2020-12-18 | End: 2020-12-18 | Stop reason: HOSPADM

## 2020-12-18 RX ORDER — ROSUVASTATIN CALCIUM 5 MG/1
5 TABLET, COATED ORAL NIGHTLY
Status: DISCONTINUED | OUTPATIENT
Start: 2020-12-18 | End: 2020-12-20

## 2020-12-18 RX ORDER — ROPIVACAINE HYDROCHLORIDE 5 MG/ML
INJECTION, SOLUTION EPIDURAL; INFILTRATION; PERINEURAL AS NEEDED
Status: DISCONTINUED | OUTPATIENT
Start: 2020-12-18 | End: 2020-12-18 | Stop reason: SURG

## 2020-12-18 RX ORDER — MEPERIDINE HYDROCHLORIDE 25 MG/ML
12.5 INJECTION INTRAMUSCULAR; INTRAVENOUS; SUBCUTANEOUS AS NEEDED
Status: DISCONTINUED | OUTPATIENT
Start: 2020-12-18 | End: 2020-12-18 | Stop reason: HOSPADM

## 2020-12-18 RX ORDER — BUPIVACAINE HYDROCHLORIDE 2.5 MG/ML
INJECTION, SOLUTION EPIDURAL; INFILTRATION; INTRACAUDAL AS NEEDED
Status: DISCONTINUED | OUTPATIENT
Start: 2020-12-18 | End: 2020-12-18 | Stop reason: SURG

## 2020-12-18 RX ORDER — MAGNESIUM HYDROXIDE 1200 MG/15ML
LIQUID ORAL CONTINUOUS PRN
Status: DISCONTINUED | OUTPATIENT
Start: 2020-12-18 | End: 2020-12-18 | Stop reason: HOSPADM

## 2020-12-18 RX ORDER — OXYCODONE HYDROCHLORIDE 10 MG/1
10 TABLET ORAL EVERY 4 HOURS PRN
Status: DISPENSED | OUTPATIENT
Start: 2020-12-18 | End: 2020-12-20

## 2020-12-18 RX ORDER — SODIUM CHLORIDE 9 MG/ML
INJECTION, SOLUTION INTRAVENOUS CONTINUOUS
Status: DISCONTINUED | OUTPATIENT
Start: 2020-12-18 | End: 2020-12-20

## 2020-12-18 RX ORDER — DEXAMETHASONE SODIUM PHOSPHATE 4 MG/ML
VIAL (ML) INJECTION AS NEEDED
Status: DISCONTINUED | OUTPATIENT
Start: 2020-12-18 | End: 2020-12-18 | Stop reason: SURG

## 2020-12-18 RX ORDER — ACETAMINOPHEN 500 MG
1000 TABLET ORAL 4 TIMES DAILY
Status: DISPENSED | OUTPATIENT
Start: 2020-12-18 | End: 2020-12-20

## 2020-12-18 RX ORDER — CETIRIZINE HYDROCHLORIDE 10 MG/1
10 TABLET ORAL DAILY
Status: DISCONTINUED | OUTPATIENT
Start: 2020-12-18 | End: 2020-12-20

## 2020-12-18 RX ORDER — LABETALOL HYDROCHLORIDE 5 MG/ML
5 INJECTION, SOLUTION INTRAVENOUS EVERY 5 MIN PRN
Status: DISCONTINUED | OUTPATIENT
Start: 2020-12-18 | End: 2020-12-18 | Stop reason: HOSPADM

## 2020-12-18 RX ORDER — DEXAMETHASONE SODIUM PHOSPHATE 10 MG/ML
INJECTION, SOLUTION INTRAMUSCULAR; INTRAVENOUS AS NEEDED
Status: DISCONTINUED | OUTPATIENT
Start: 2020-12-18 | End: 2020-12-18 | Stop reason: SURG

## 2020-12-18 RX ORDER — OXYCODONE HYDROCHLORIDE 15 MG/1
15 TABLET ORAL EVERY 4 HOURS PRN
Status: ACTIVE | OUTPATIENT
Start: 2020-12-18 | End: 2020-12-20

## 2020-12-18 RX ORDER — DEXTROSE MONOHYDRATE 25 G/50ML
50 INJECTION, SOLUTION INTRAVENOUS
Status: DISCONTINUED | OUTPATIENT
Start: 2020-12-18 | End: 2020-12-18 | Stop reason: HOSPADM

## 2020-12-18 RX ORDER — TIZANIDINE 2 MG/1
2 TABLET ORAL 3 TIMES DAILY PRN
Status: DISCONTINUED | OUTPATIENT
Start: 2020-12-18 | End: 2020-12-20

## 2020-12-18 RX ORDER — HYDROMORPHONE HYDROCHLORIDE 1 MG/ML
0.8 INJECTION, SOLUTION INTRAMUSCULAR; INTRAVENOUS; SUBCUTANEOUS EVERY 2 HOUR PRN
Status: ACTIVE | OUTPATIENT
Start: 2020-12-18 | End: 2020-12-20

## 2020-12-18 RX ORDER — CEFAZOLIN SODIUM/WATER 2 G/20 ML
2 SYRINGE (ML) INTRAVENOUS EVERY 8 HOURS
Status: COMPLETED | OUTPATIENT
Start: 2020-12-18 | End: 2020-12-19

## 2020-12-18 RX ORDER — INSULIN ASPART 100 [IU]/ML
INJECTION, SOLUTION INTRAVENOUS; SUBCUTANEOUS ONCE
Status: DISCONTINUED | OUTPATIENT
Start: 2020-12-18 | End: 2020-12-18 | Stop reason: HOSPADM

## 2020-12-18 RX ORDER — MELATONIN
325
Status: DISCONTINUED | OUTPATIENT
Start: 2020-12-19 | End: 2020-12-20

## 2020-12-18 RX ORDER — NALOXONE HYDROCHLORIDE 0.4 MG/ML
80 INJECTION, SOLUTION INTRAMUSCULAR; INTRAVENOUS; SUBCUTANEOUS AS NEEDED
Status: DISCONTINUED | OUTPATIENT
Start: 2020-12-18 | End: 2020-12-18 | Stop reason: HOSPADM

## 2020-12-18 RX ORDER — MIDAZOLAM HYDROCHLORIDE 1 MG/ML
1 INJECTION INTRAMUSCULAR; INTRAVENOUS EVERY 5 MIN PRN
Status: DISCONTINUED | OUTPATIENT
Start: 2020-12-18 | End: 2020-12-18 | Stop reason: HOSPADM

## 2020-12-18 RX ORDER — HYDROMORPHONE HYDROCHLORIDE 1 MG/ML
0.4 INJECTION, SOLUTION INTRAMUSCULAR; INTRAVENOUS; SUBCUTANEOUS EVERY 2 HOUR PRN
Status: DISPENSED | OUTPATIENT
Start: 2020-12-18 | End: 2020-12-20

## 2020-12-18 RX ORDER — ACETAMINOPHEN 500 MG
1000 TABLET ORAL ONCE
Status: DISCONTINUED | OUTPATIENT
Start: 2020-12-18 | End: 2020-12-18

## 2020-12-18 RX ORDER — OMEPRAZOLE 20 MG/1
20 CAPSULE, DELAYED RELEASE ORAL
COMMUNITY
End: 2022-02-01

## 2020-12-18 RX ORDER — HYDROXYCHLOROQUINE SULFATE 200 MG/1
400 TABLET, FILM COATED ORAL DAILY
COMMUNITY
End: 2020-12-28

## 2020-12-18 RX ORDER — DOCUSATE SODIUM 100 MG/1
100 CAPSULE, LIQUID FILLED ORAL 2 TIMES DAILY
Status: DISCONTINUED | OUTPATIENT
Start: 2020-12-18 | End: 2020-12-20

## 2020-12-18 RX ORDER — PROCHLORPERAZINE EDISYLATE 5 MG/ML
10 INJECTION INTRAMUSCULAR; INTRAVENOUS EVERY 6 HOURS PRN
Status: ACTIVE | OUTPATIENT
Start: 2020-12-18 | End: 2020-12-20

## 2020-12-18 RX ORDER — ACETAMINOPHEN 325 MG/1
650 TABLET ORAL ONCE
Status: COMPLETED | OUTPATIENT
Start: 2020-12-18 | End: 2020-12-18

## 2020-12-18 RX ORDER — ONDANSETRON 2 MG/ML
4 INJECTION INTRAMUSCULAR; INTRAVENOUS AS NEEDED
Status: DISCONTINUED | OUTPATIENT
Start: 2020-12-18 | End: 2020-12-18 | Stop reason: HOSPADM

## 2020-12-18 RX ORDER — DEXAMETHASONE SODIUM PHOSPHATE 10 MG/ML
8 INJECTION, SOLUTION INTRAMUSCULAR; INTRAVENOUS ONCE
Status: COMPLETED | OUTPATIENT
Start: 2020-12-19 | End: 2020-12-19

## 2020-12-18 RX ADMIN — CEFAZOLIN SODIUM/WATER 2 G: 2 G/20 ML SYRINGE (ML) INTRAVENOUS at 09:15:00

## 2020-12-18 RX ADMIN — MIDAZOLAM HYDROCHLORIDE 2 MG: 1 INJECTION INTRAMUSCULAR; INTRAVENOUS at 09:14:00

## 2020-12-18 RX ADMIN — BUPRENORPHINE HYDROCHLORIDE 150 MCG: 0.32 INJECTION INTRAMUSCULAR; INTRAVENOUS at 09:25:00

## 2020-12-18 RX ADMIN — DEXAMETHASONE SODIUM PHOSPHATE 2 MG: 10 INJECTION, SOLUTION INTRAMUSCULAR; INTRAVENOUS at 09:25:00

## 2020-12-18 RX ADMIN — SODIUM CHLORIDE, SODIUM LACTATE, POTASSIUM CHLORIDE, CALCIUM CHLORIDE: 600; 310; 30; 20 INJECTION, SOLUTION INTRAVENOUS at 09:10:00

## 2020-12-18 RX ADMIN — DEXAMETHASONE SODIUM PHOSPHATE 4 MG: 4 MG/ML VIAL (ML) INJECTION at 10:15:00

## 2020-12-18 RX ADMIN — SODIUM CHLORIDE, SODIUM LACTATE, POTASSIUM CHLORIDE, CALCIUM CHLORIDE: 600; 310; 30; 20 INJECTION, SOLUTION INTRAVENOUS at 10:40:00

## 2020-12-18 RX ADMIN — ONDANSETRON 4 MG: 2 INJECTION INTRAMUSCULAR; INTRAVENOUS at 10:14:00

## 2020-12-18 RX ADMIN — BUPIVACAINE HYDROCHLORIDE 20 ML: 2.5 INJECTION, SOLUTION EPIDURAL; INFILTRATION; INTRACAUDAL at 09:30:00

## 2020-12-18 RX ADMIN — ROPIVACAINE HYDROCHLORIDE 20 ML: 5 INJECTION, SOLUTION EPIDURAL; INFILTRATION; PERINEURAL at 09:25:00

## 2020-12-18 RX ADMIN — CLONIDINE 50 MCG: 100 INJECTION, SOLUTION EPIDURAL at 09:25:00

## 2020-12-18 NOTE — PLAN OF CARE
S/p left total knee. Arrived to floor from PACU. Pain severe on arrival but pt very drowsy.  at bedside. Pt speaks Kinyarwanda but understands RN to follow commands. States there is still numbness bilaterally but mainly LLE. ESSIE, REBEL. DTV.  ADA diet, but pt

## 2020-12-18 NOTE — CM/SW NOTE
Spoke with Gina Lima from [facility] who stated that pt's insurance appears to be a \"health and wellness plan\" only and does not appear to have coverage for [place] [address] services. Await PT recommendations for further DC planning.   / to remain

## 2020-12-18 NOTE — CONSULTS
[facility]ITAL[facility]  CONSULT     Nina Jamil Patient Status:  Outpatient in a Bed    12/10/1960 MRN [id number]   [facility] 3SW-A Attending Shawn Ward MD   Hosp Day # 0 PCP Melissa Oliveira MD     Reason for consult: Medical History:   Family History   Problem Relation Age of Onset   • Other (Other) Father         tuberculosis   • Other (Coronary artery disease) Father    • Other (TB) Father    • Dementia Mother    • Hypertension Mother    • Breast Cancer Sister 36        oren mucous membranes. EOM-I. PERRLA. Anicteric. Neck: No lymphadenopathy. No JVD. No carotid bruits. Respiratory: Clear to auscultation bilaterally. No wheezes. No rhonchi. Cardiovascular: S1, S2. Regular rate and rhythm. No murmurs, rubs or gallops.  Equal

## 2020-12-18 NOTE — INTERVAL H&P NOTE
Pre-op Diagnosis: Primary osteoarthritis of left knee [M17.12]    The above referenced H&P was reviewed by KRYSTAL Sy on 41/91/9193, the patient was examined and no significant changes have occurred in the patient's condition since the H&P was perf

## 2020-12-18 NOTE — CM/SW NOTE
12/18/20 1400   CM/SW Referral Data   Referral Source Social Work (self-referral)   Reason for Referral Discharge planning   Discharge Needs   Anticipated D/C needs Home health care       Patient is a 61 y/o woman s/p TKA with Dr Demetris Jordan.   Pt with Joint J

## 2020-12-18 NOTE — BRIEF OP NOTE
Pre-Operative Diagnosis: Primary osteoarthritis of left knee [M17.12]     Post-Operative Diagnosis: Primary osteoarthritis of left knee [M17.12]      Procedure Performed:   Procedure(s):  LEFT TOTAL KNEE ARTHROPLASTY    Surgeon(s) and Role:     * Will Grijalva

## 2020-12-18 NOTE — ANESTHESIA POSTPROCEDURE EVALUATION
[address] [facility] Patient Status:  Outpatient in a Bed   Age/Gender 62year old female MRN [id number]   [facility] SURGERY Attending Lynette Calderon MD   Hosp Day # 0 PCP Sera Worrell MD       Anesthesia Post-op Note

## 2020-12-18 NOTE — ANESTHESIA PROCEDURE NOTES
Regional Block  Performed by: Gil Garcia MD  Authorized by: Gil Garcia MD       General Information and Staff    Start Time:  12/18/2020 9:22 AM  End Time:  12/18/2020 9:30 AM  Anesthesiologist:  Gil Garcia MD  Performed by:   Anesthesiologist

## 2020-12-18 NOTE — PHYSICAL THERAPY NOTE
PHYSICAL THERAPY KNEE EVALUATION - INPATIENT     Room Number: 366/366-A  Evaluation Date: 12/18/2020  Type of Evaluation: Initial  Physician Order: PT Eval and Treat    Presenting Problem: L TKA  Reason for Therapy: Mobility Dysfunction and Discharge Plann No  Patient Owned Equipment: None       Prior Level of [place]: Ind/mod I in her mobilities at home.  Sleeps upstairs    SUBJECTIVE  Per DIL; poor pain tolerance, take pain pills as soon as she feels pain at home    Patient self-stated goal is : home time;Shuffle     Comment : poor pain tolerance. Needs 100% cueing on breathing and relation technique    Skilled Therapy Provided:  in bed and initiated AROM on L knee with instruction of HEP as per day #0 protocol.  C/o pain on the L knee even with minimal DM, HTN, arthritis.  In this PT evaluation, the patient presents with the following impairments :LOM on the L knee with decrease functional strength affecting  Bed mobilities, transfers and gt on plain surface as well as stair navigation requiring increase

## 2020-12-18 NOTE — ANESTHESIA PREPROCEDURE EVALUATION
PRE-OP EVALUATION    Patient Name: Tracy Signs    Pre-op Diagnosis: Primary osteoarthritis of left knee [M17.12]    Procedure(s):  LEFT TOTAL KNEE ARTHROPLASTY    Surgeon(s) and Role:     Fabian Lopes MD - Primary    Pre-op vitals reviewed. Succinate], Imitrex [Sumatriptan], Nexium [Esomeprazole], Pollen, and Radiology Contrast Iodinated Dyes      Anesthesia Evaluation    Patient summary reviewed.     Anesthetic Complications  (-) history of anesthetic complications         GI/Hepatic/Renal 292 12/02/2020     Lab Results   Component Value Date     12/02/2020    K 4.5 12/02/2020     12/02/2020    CO2 32 12/02/2020    BUN 19 12/02/2020    CREATSERUM 0.79 12/02/2020     (H) 12/02/2020    CA 9.9 12/02/2020     Lab Results   Com

## 2020-12-18 NOTE — ANESTHESIA PROCEDURE NOTES
Spinal Block  Performed by: Vee Marroquin MD  Authorized by: Vee Marroquin MD       General Information and Staff    Start Time:  12/18/2020 9:12 AM  End Time:  12/18/2020 9:20 AM  Anesthesiologist:  Vee Marroquin MD  Performed by:   Anesthesiologist  Pa

## 2020-12-18 NOTE — ANESTHESIA PROCEDURE NOTES
Regional Block  Performed by: Austin Grady MD  Authorized by: Austin Grady MD       General Information and Staff    Start Time:  12/18/2020 9:22 AM  End Time:  12/18/2020 9:30 AM  Anesthesiologist:  Austin Grady MD  Patient Location:  OR    Block [address]

## 2020-12-19 PROCEDURE — 99213 OFFICE O/P EST LOW 20 MIN: CPT | Performed by: HOSPITALIST

## 2020-12-19 NOTE — PLAN OF CARE
Patient received awake, alert and oriented x 4,  at the bedside. Left knee with ace wrap  dressing clean and dry intact. Blood pressure medication held this morning due /49.   Patient was able to participate with PT, she complaint of some diz

## 2020-12-19 NOTE — PHYSICAL THERAPY NOTE
PHYSICAL THERAPY TREATMENT NOTE - INPATIENT    Room Number: 366/366-A     Session: 1   Number of Visits to Meet Established Goals: 3    Presenting Problem: L TKA    Problem List  Principal Problem:    Primary osteoarthritis of left knee  Active Problems: Static Sitting: Good  Dynamic Sitting: Good           Static Standing: Fair +  Dynamic Standing: Poor +    ACTIVITY TOLERANCE  Pulse: 76        BP: 105/45  BP Location: Right arm  BP Method: Automatic required to sit due to dizziness. Pt wheeled to rehab gym. Pt instructed in  Step to gait pattern and use of HR. Pt with supervision for ascend/descending stairs with step to gait pattern and began to feeel dizzy at top of stairs.  Pt instructed to focus on care/supervision;Home with home health PT     PLAN  PT Treatment Plan: Bed mobility; Body mechanics; Endurance; Patient education; Family education;Gait training;Range of motion;Strengthening;Stair training;Transfer training;Balance training  Rehab Potential :

## 2020-12-19 NOTE — OCCUPATIONAL THERAPY NOTE
OCCUPATIONAL THERAPY QUICK EVALUATION - INPATIENT    Room Number: 366/366-A  Evaluation Date: 12/19/2020     Type of Evaluation: Quick Eval  Presenting Problem: L TKR    Physician Order: IP Consult to Occupational Therapy  Reason for Therapy:  ADL/IADL Dys Family    Toilet and Equipment: Standard height toilet                Drives: No       Prior Level of Function: lives with . Son and daughter in law live close by. Daughter in law will be assisting the pt with ADL at home.        OBJECTIVE     Fall over her L leg. Per pt, her daughter in law will be assisting her with dressing at home. Independent UB dressing. Supervision to stand and to maintain balance while pt pulled pants over her waist.   Pt was left with PT and . 96% room air.

## 2020-12-19 NOTE — CONSULTS
Post Op Day 1 Ortho Note    Status Post Nerve Block:  Type of Nerve Block: Left Adductor canal and IPACK  Single Injection Nerve Block    Post op review: No evidence of immediate block related complications      Plan discussed with/by: Anesthesia - Dr. Jada Danielle

## 2020-12-19 NOTE — PROGRESS NOTES
[facility]  Progress Note    Para Dimmer Patient Status:  Outpatient in a Bed    12/10/1960 MRN [id number]   [facility] 3SW-A Attending Odette Cervantes MD   Hosp Day # 0 PCP Guillaume Ambriz MD     SUBJECTIVE:  INTERVAL HISTO management - Norco at pharmacy  2. Continue DVT prophylaxis - Eliquis at pharmacy  3. Continue PT/OT  4. Discharge plannin [address] when cleared by PT and hospitalist  5. Hg 9.5, monitor  6. Dressing change POD 7 to mepilex  7.  Continue medi

## 2020-12-19 NOTE — PROGRESS NOTES
[facility]  Progress Note     Inocencio Walter Patient Status:  Outpatient in a Bed    12/10/1960 MRN [id number]   [facility] 3SW-A Attending Jadon Booth MD   Hosp Day # 0 PCP Marie Garcia MD     Chief Complaint: Medical Intravenous Q6H   • Senna  17.2 mg Oral Nightly   • docusate sodium  100 mg Oral BID   • ferrous sulfate  325 mg Oral Daily with breakfast   • apixaban  2.5 mg Oral [email or web address]   • cetirizine  10 mg Oral Daily   • Pantoprazole Sodium  20 mg Oral QA AC

## 2020-12-19 NOTE — PLAN OF CARE
A&O x4. VSS. 2L O2 per nc, JOSÉ LUIS w/cpap at night. Pain controlled. Ambulating with mod assist. CPM as ordered. Voids freely. Bilateral SCD's. LLE ace wrap C/D/I, gel ice to site. Reviewed POC, pain management, IS use, and fall precautions with pt.  Bed alarm

## 2020-12-19 NOTE — RESPIRATORY THERAPY NOTE
JOSÉ LUIS : EQUIPMENT USE: DAILY SUMMARY                                            SET MODE:AUTO CPAP WITH CFLEX                                          USAGE IN HOURS:10:24                                          90%

## 2020-12-19 NOTE — OPERATIVE REPORT
[facility]    PATIENT'S NAME: Donaldo Roe   ATTENDING PHYSICIAN: Evelyne Osman M.D. OPERATING PHYSICIAN: Evelyne Osman M.D.    PATIENT ACCOUNT#:   [de-identified]    LOCATION:  [address] [place] [address]  MEDICAL RECORD #:   [id number]       DATE OF BIRTH of the medial tibial plateau. The ACL and PCL were sacrificed. The medial and lateral meniscal remnants were removed, the infrapatellar fat pad excised, the suprapatellar synovium removed.   A drill hole was created in the center of the intercondylar groo placed down the drill hole. The outrigger was attached to the intramedullary device and rotated externally to match the natural external rotation of the tibial tubercle and the tibial crest.  The outrigger was pinned in position.   The intramedullary devic mm.  Drill holes were created for the patellar button. Height of the patellar button on the resected surface of the patella was approximately 18 mm. Tracking of the patella was good with a no thumb technique. Trials were removed.   Drill holes were creat the excess cement, closure of the incision, and application of dressings. Dictated By Arabella Huber M.D.  d: 12/18/2020 21:54:32  t: 12/19/2020 09:28:00  [facility] [id number]  JNQ/    cc: Melita Lav, M.D. Karyle Favorite, M.D.

## 2020-12-20 VITALS
RESPIRATION RATE: 18 BRPM | HEART RATE: 75 BPM | BODY MASS INDEX: 32.86 KG/M2 | HEIGHT: 63 IN | WEIGHT: 185.44 LBS | SYSTOLIC BLOOD PRESSURE: 119 MMHG | DIASTOLIC BLOOD PRESSURE: 55 MMHG | TEMPERATURE: 98 F | OXYGEN SATURATION: 98 %

## 2020-12-20 PROCEDURE — 99213 OFFICE O/P EST LOW 20 MIN: CPT | Performed by: HOSPITALIST

## 2020-12-20 NOTE — CM/SW NOTE
Per RN, pt/family wants to private pay for [address]. [facility] quoted $150.00 per visit and will need credit card to assure payment. Kevin Jett from [facility] aware.     Bobbi Sierra LCSW  /Discharge Planner  [phone]

## 2020-12-20 NOTE — DISCHARGE SUMMARY
Discharge Summary  Patient ID:  Ray Buckner  [id number]  18 year old  12/10/1960    Admit date: 12/18/2020    Discharge date and time: 12/20/2020    Attending Physician:  DEVAN Riley MD    Reason for admission: Primary osteoarthritis of left knee [M

## 2020-12-20 NOTE — CM/SW NOTE
12/20/20 1300   Discharge disposition   Expected discharge disposition Home-Health   Name of Facillity/Home Care/Hospice Residential     Pt has agreed to private pay for [place] [id number] PT visits through [address] [place] [address] [facility]  /Discharge

## 2020-12-20 NOTE — PROGRESS NOTES
[facility]  Progress Note    Tracy Signs Patient Status:  Outpatient in a Bed    12/10/1960 MRN [id number]   [facility] 3SW-A Attending Angi Wolf MD   Hosp Day # 0 PCP Karyle Favorite, MD     SUBJECTIVE:  INTERVAL HISTO Labs   Lab 12/19/20  0540   HGB 9.5*   HCT 30.1*      No results for input(s): PTP, INR in the last 168 hours. ASSESSMENT/PLAN:  POD 2 s/p  L TKA  1. Continue pain management - [place] at pharmacy  2. Continue DVT prophylaxis - Eliquis at pharmacy  3.  C

## 2020-12-20 NOTE — PLAN OF CARE
Pt passed therapy goals this am.  Pain better today. Denies any dizziness. Walked in benson with SBA. Dressing dry and intact to left knee. Residential  arranged for dc. Pt and spouse watched dc instruction video.   Written and verbal instructions also

## 2020-12-20 NOTE — CM/SW NOTE
RN called to inquire about HHC at [place]. There [facility] agencies in [address] [place] [address] at this time. Per ANSHU note from Friday, pt may not have [place] [address] benefits under her insurance. ANSHU attempted to call insurance but they are closed. ANSHU updated RN.  She will ask

## 2020-12-20 NOTE — PHYSICAL THERAPY NOTE
PHYSICAL THERAPY TREATMENT NOTE - INPATIENT    Room Number: 366/366-A     Session: 2   Number of Visits to Meet Established Goals: 3    Presenting Problem: L TKA    Problem List  Principal Problem:    Primary osteoarthritis of left knee  Active Problems: Static Sitting: Good  Dynamic Sitting: Good           Static Standing: Fair +  Dynamic Standing: Poor +    ACTIVITY TOLERANCE           BP: 134/62  BP Location: Right arm  BP Method: Automatic  Patient Position: Sitt and instructed in stair training with step to gait pattern and use of 1 HR. Pt Mod I for stairs. Pt then instructed in car transfer technique and pt requires Min A for L LE management to lift up into car/tub. Pt required seated rest break xx 5 minutes.  Pt Recommendations: Home with home health PT;Outpatient PT     PLAN  PT Treatment Plan: Bed mobility; Body mechanics; Endurance; Patient education; Family education;Gait training;Range of motion;Strengthening;Stair training;Transfer training;Balance training  [place]

## 2020-12-20 NOTE — RESPIRATORY THERAPY NOTE
JOSÉ LUIS : EQUIPMENT USE: DAILY SUMMARY                                            SET MODE:AUTO CPAP WITH CFLEX                                          USAGE IN HOURS:5:26                                          90%

## 2020-12-20 NOTE — HOME CARE LIAISON
[facility] able to accept ptnt for [place] on dc. Ptnt has agreed to pay for visits.  [facility] office will call ptnt to arrange payment on the phone, once everything is confirmed [facility] will see ptnt    Thanks  Freddie Saunders

## 2020-12-20 NOTE — PLAN OF CARE
A&O x4. VSS. Pt denying dizziness at this time. Room air, JOSÉ LUIS w/cpap at night. Pain controlled. Ambulating with mod assist. CPM as ordered. Voids freely. Bilateral SCD's. LLE ace wrap C/D/I, gel ice to site.  Reviewed POC, pain management, IS use, and fall

## 2020-12-20 NOTE — PROGRESS NOTES
NAYANA HOSPITALIST  Progress Note     Vega Pena Patient Status:  Outpatient in a Bed    12/10/1960 MRN [id number]   [facility] 3SW-A Attending Kaushik Rivera MD   Hosp Day # 0 PCP Kenneth Odonnell MD     Chief Complaint: Medical • apixaban  2.5 mg Oral Deion@[employer]   • cetirizine  10 mg Oral Daily   • Pantoprazole Sodium  20 mg Oral QAM AC   • Rosuvastatin Calcium  5 mg Oral Nightly   • Insulin Aspart Pen  1-10 Units Subcutaneous TID AC and HS   • lisinopril-hydrochlorothiazide

## 2020-12-21 ENCOUNTER — TELEPHONE (OUTPATIENT)
Dept: FAMILY MEDICINE CLINIC | Facility: CLINIC | Age: 60
End: 2020-12-21

## 2020-12-21 NOTE — TELEPHONE ENCOUNTER
Pt's daughter called to schedule a post op appt with Dr. Richy Aleman. Doctor that did pt's surgery said she needs to be seen this week. Dr. Gaby Akhtar first available is 1/8/21. Please advise. Pt's daughter would like a call back.

## 2020-12-22 RX ORDER — ONDANSETRON 4 MG/1
4 TABLET, FILM COATED ORAL EVERY 8 HOURS PRN
Qty: 20 TABLET | Refills: 2 | Status: SHIPPED | OUTPATIENT
Start: 2020-12-22 | End: 2021-08-31

## 2020-12-22 NOTE — TELEPHONE ENCOUNTER
Future Appointments   Date Time Provider Lilliana Afsaneh   12/28/2020  1:40 PM Rosetta Cranker, MD [facility]

## 2020-12-22 NOTE — TELEPHONE ENCOUNTER
If pt is doing ok, I can see her on 12/28. Can add her to my schedule at 1:40pm.  If she needs to be seen this week, can see one of the other providers in the office, as I do not have any openings this week.

## 2020-12-22 NOTE — TELEPHONE ENCOUNTER
VMML for patient's daughter and informed the soonest  can see patient is 12/28/2020 after her regular hours at 1:40 pm. If it is imperative patient be seen this week, can schedule with one of the other providers.   Requested call back to schedule a

## 2020-12-22 NOTE — PROGRESS NOTES
Received a call from her daughter  Patient had a knee replacement surgery and was discharged from the hospital yesterday  She has been feeling dizzy nauseous and has been throwing up    Discussed the side effects of Norco that includes nausea vomiting cons

## 2020-12-28 ENCOUNTER — OFFICE VISIT (OUTPATIENT)
Dept: FAMILY MEDICINE CLINIC | Facility: CLINIC | Age: 60
End: 2020-12-28
Payer: COMMERCIAL

## 2020-12-28 VITALS
DIASTOLIC BLOOD PRESSURE: 68 MMHG | TEMPERATURE: 98 F | SYSTOLIC BLOOD PRESSURE: 126 MMHG | HEIGHT: 63 IN | BODY MASS INDEX: 33.49 KG/M2 | HEART RATE: 81 BPM | OXYGEN SATURATION: 96 % | WEIGHT: 189 LBS | RESPIRATION RATE: 16 BRPM

## 2020-12-28 DIAGNOSIS — R73.03 PREDIABETES: ICD-10-CM

## 2020-12-28 DIAGNOSIS — I10 ESSENTIAL HYPERTENSION: ICD-10-CM

## 2020-12-28 DIAGNOSIS — Z99.89 OSA ON CPAP: ICD-10-CM

## 2020-12-28 DIAGNOSIS — G47.33 OSA ON CPAP: ICD-10-CM

## 2020-12-28 DIAGNOSIS — M79.662 TENDERNESS OF LEFT CALF: Primary | ICD-10-CM

## 2020-12-28 DIAGNOSIS — M05.9 SEROPOSITIVE RHEUMATOID ARTHRITIS (HCC): ICD-10-CM

## 2020-12-28 DIAGNOSIS — Z96.652 S/P TKR (TOTAL KNEE REPLACEMENT), LEFT: ICD-10-CM

## 2020-12-28 DIAGNOSIS — K21.9 GASTROESOPHAGEAL REFLUX DISEASE WITHOUT ESOPHAGITIS: ICD-10-CM

## 2020-12-28 DIAGNOSIS — D64.9 ANEMIA, UNSPECIFIED TYPE: ICD-10-CM

## 2020-12-28 PROCEDURE — 3078F DIAST BP <80 MM HG: CPT | Performed by: FAMILY MEDICINE

## 2020-12-28 PROCEDURE — 1111F DSCHRG MED/CURRENT MED MERGE: CPT | Performed by: FAMILY MEDICINE

## 2020-12-28 PROCEDURE — 3074F SYST BP LT 130 MM HG: CPT | Performed by: FAMILY MEDICINE

## 2020-12-28 PROCEDURE — 99214 OFFICE O/P EST MOD 30 MIN: CPT | Performed by: FAMILY MEDICINE

## 2020-12-28 PROCEDURE — 3008F BODY MASS INDEX DOCD: CPT | Performed by: FAMILY MEDICINE

## 2020-12-28 RX ORDER — FERROUS SULFATE 325(65) MG
325 TABLET ORAL
Qty: 30 TABLET | Refills: 2 | Status: SHIPPED | OUTPATIENT
Start: 2020-12-28 | End: 2021-12-20

## 2020-12-28 NOTE — PROGRESS NOTES
Shannon Coleman is a 62year old female. HPI:  Patient is here with her daughter for follow-up after left TKR on 12/18/2020. Complains of left knee pain and swelling, did not tolerate Norco due to nausea/vomiting/dizziness.   Was taking 2 tablets at Tab Take 1 tablet (2.5 mg total) by mouth 2 (two) times daily. 24 tablet 0   • omeprazole 20 MG Oral Capsule Delayed Release Take 20 mg by mouth every morning before breakfast. prn      • Rosuvastatin Calcium 5 MG Oral Tab Take 5 mg by mouth nightly.      • groin abscess bilaterally   • RIGHT PHACOEMULSIFICATION OF CATARACT WITH INTRAOCULAR LENS IMPLANT 52026 Right 7/28/2020    Performed by Debby Saldana MD at [address] [place] [place] [address]         Social History    Tobacco Use      Smoking status: Never Smoker findings, recommend venous Dopplers as ordered. Continue Eliquis twice daily  Elevate, ice throughout the day  Continue with PT  Norco 10 mg worked better for pain, but had some side effects with nausea/vomiting/dizziness.   Was not taking regularly with f

## 2021-03-20 DIAGNOSIS — Z23 NEED FOR VACCINATION: ICD-10-CM

## 2021-03-22 ENCOUNTER — TELEPHONE (OUTPATIENT)
Dept: FAMILY MEDICINE CLINIC | Facility: CLINIC | Age: 61
End: 2021-03-22

## 2021-03-22 DIAGNOSIS — Z12.31 ENCOUNTER FOR SCREENING MAMMOGRAM FOR BREAST CANCER: Primary | ICD-10-CM

## 2021-04-01 ENCOUNTER — TELEPHONE (OUTPATIENT)
Dept: FAMILY MEDICINE CLINIC | Facility: CLINIC | Age: 61
End: 2021-04-01

## 2021-04-01 ENCOUNTER — TELEMEDICINE (OUTPATIENT)
Dept: FAMILY MEDICINE CLINIC | Facility: CLINIC | Age: 61
End: 2021-04-01
Payer: COMMERCIAL

## 2021-04-01 DIAGNOSIS — J34.89 RHINORRHEA: ICD-10-CM

## 2021-04-01 DIAGNOSIS — M79.10 MYALGIA: ICD-10-CM

## 2021-04-01 DIAGNOSIS — R05.9 COUGH: ICD-10-CM

## 2021-04-01 DIAGNOSIS — R50.81 FEVER IN OTHER DISEASES: Primary | ICD-10-CM

## 2021-04-01 DIAGNOSIS — R51.9 ACUTE NONINTRACTABLE HEADACHE, UNSPECIFIED HEADACHE TYPE: ICD-10-CM

## 2021-04-01 PROCEDURE — 99213 OFFICE O/P EST LOW 20 MIN: CPT | Performed by: FAMILY MEDICINE

## 2021-04-01 RX ORDER — CODEINE PHOSPHATE AND GUAIFENESIN 10; 100 MG/5ML; MG/5ML
5 SOLUTION ORAL EVERY 6 HOURS PRN
Qty: 120 ML | Refills: 0 | Status: SHIPPED | OUTPATIENT
Start: 2021-04-01 | End: 2021-08-31

## 2021-04-01 NOTE — PROGRESS NOTES
[address] A 62year old female evaluated via telehealth visit FOR Patient presents with:  Runny Nose: fever, chillis, bodyaches, sneezing ,cough and heaaches for 5 days. due to current national emergency with SARS-CoV-2 pandemic.      Histor neck    • Unspecified essential hypertension    • Unspecified essential hypertension    • Vertigo    • Vitamin D deficiency        Past Surgical History:   Procedure Laterality Date   • CATARACT Bilateral 2020   • COLONOSCOPY  08/01/2020    Tubular Adenoma Calcium 5 MG Oral Tab Take 5 mg by mouth nightly. • Cholecalciferol 50 MCG (2000 UT) Oral Tab Take 2,000 tablets by mouth daily. • Lisinopril-hydroCHLOROthiazide 10-12.5 MG Oral Tab Take 1 tablet by mouth once daily.  90 tablet 1   • metFORMIN HCl E Asked        Stress Concern: Not Asked        Weight Concern: Not Asked        Special Diet: Not Asked        Back Care: Not Asked        Exercise: No        Bike Helmet: Not Asked        Seat Belt: Not Asked        Self-Exams: Not Asked    Social History

## 2021-04-01 NOTE — TELEPHONE ENCOUNTER
Patient's daughter stated that pt has cold symptoms, no fever and would like to be seen in office. Informed dtr that pt's with cold symptoms would typically be seen in a video visit.     Dtr wants pt to come into office to be seen by Dr. Nicole Man or Dr. Melania Domínguez

## 2021-04-01 NOTE — TELEPHONE ENCOUNTER
Pt's  calling asking for some medication for this pt. States she has been coughing, sneezing, some chills, and a little fever. Got vaccine last week and has been experiencing these symptoms for 3-4 days.  Let him know that the nurse will give him a c

## 2021-04-01 NOTE — TELEPHONE ENCOUNTER
Spoke to patient's daughter who states symptoms started about 3 days ago. Sneezing, nasal congestion not relieved with tylenol sinus, claritin, flonase and symptomatic tx. Has been drinking herbal tea, steam tx.   Denies fever, HA, ST, cough, SOB, or chest

## 2021-04-01 NOTE — PATIENT INSTRUCTIONS
For symptom relief with upper respiratory symptoms: Sudafed (pseudoephedrine, request this at the pharmacist's counter) 30 mg 1 or 2 tablets every 6 hours as needed for nasal congestion. This short-acting dose usually will not increase blood pressure much.

## 2021-06-17 DIAGNOSIS — I10 ESSENTIAL HYPERTENSION: ICD-10-CM

## 2021-06-17 RX ORDER — LISINOPRIL AND HYDROCHLOROTHIAZIDE 12.5; 1 MG/1; MG/1
TABLET ORAL
Qty: 30 TABLET | Refills: 0 | Status: SHIPPED | OUTPATIENT
Start: 2021-06-17 | End: 2021-07-27

## 2021-06-17 NOTE — TELEPHONE ENCOUNTER
"Anesthesia Transfer of Care Note    Patient: Nati Hathaway    Procedure(s) Performed: * No procedures listed *    Patient location: Labor and Delivery    Anesthesia Type: epidural    Post pain: adequate analgesia    Post assessment: no apparent anesthetic complications    Post vital signs: stable    Level of consciousness: awake and alert    Nausea/Vomiting: no nausea/vomiting    Complications: none    Transfer of care protocol was followed      Last vitals:   Visit Vitals  /69 (BP Location: Right arm)   Pulse 103   Temp 37.1 °C (98.8 °F) (Axillary)   Resp 19   Ht 5' 3" (1.6 m)   Wt 89.4 kg (197 lb)   LMP 05/26/2018   SpO2 100%   Breastfeeding? No   BMI 34.90 kg/m²     " Hypertension Medications Protocol [id number]/17/2021 03:58 PM   CMP or BMP in past 12 months Protocol Details    Last serum creatinine< 2.0     Appointment in past 6 or next 3 months      LOV 12/28/20      LAST LAB   2/17/21    LAST RX 11/30/20 90 with 1 ref

## 2021-07-27 DIAGNOSIS — I10 ESSENTIAL HYPERTENSION: ICD-10-CM

## 2021-07-27 RX ORDER — LISINOPRIL AND HYDROCHLOROTHIAZIDE 12.5; 1 MG/1; MG/1
TABLET ORAL
Qty: 90 TABLET | Refills: 0 | Status: SHIPPED | OUTPATIENT
Start: 2021-07-27 | End: 2021-10-22

## 2021-07-27 NOTE — TELEPHONE ENCOUNTER
Spouse called again, states  She is leaving tomorrow morning for [place] for 5 days and has no medication. He is asking for refill request to be filled today to [facility]. Also asking why she is only getting 30 days at a time?   Please notify wh

## 2021-07-27 NOTE — TELEPHONE ENCOUNTER
LOV 12/28/2020      LAST LAB 12/2/2020    LAST RX  LISINOPRIL-HYDROCHLOROTHIAZIDE 10-12.5 MG Oral Tab 30 tablet 0 6/17/2021         Next OV   Future Appointments   Date Time Provider Lilliana Shelby   8/9/2021  2:40 PM SYD [facility]

## 2021-08-31 ENCOUNTER — OFFICE VISIT (OUTPATIENT)
Dept: FAMILY MEDICINE CLINIC | Facility: CLINIC | Age: 61
End: 2021-08-31
Payer: COMMERCIAL

## 2021-08-31 VITALS
HEART RATE: 75 BPM | TEMPERATURE: 97 F | RESPIRATION RATE: 16 BRPM | BODY MASS INDEX: 37.38 KG/M2 | DIASTOLIC BLOOD PRESSURE: 70 MMHG | HEIGHT: 61 IN | OXYGEN SATURATION: 97 % | SYSTOLIC BLOOD PRESSURE: 122 MMHG | WEIGHT: 198 LBS

## 2021-08-31 DIAGNOSIS — R63.5 WEIGHT GAIN: ICD-10-CM

## 2021-08-31 DIAGNOSIS — N39.41 URGE INCONTINENCE: ICD-10-CM

## 2021-08-31 DIAGNOSIS — Z96.652 S/P TKR (TOTAL KNEE REPLACEMENT), LEFT: ICD-10-CM

## 2021-08-31 DIAGNOSIS — Z99.89 OSA ON CPAP: ICD-10-CM

## 2021-08-31 DIAGNOSIS — Z00.00 LABORATORY EXAMINATION ORDERED AS PART OF A COMPLETE PHYSICAL EXAMINATION: ICD-10-CM

## 2021-08-31 DIAGNOSIS — Z23 NEED FOR TDAP VACCINATION: ICD-10-CM

## 2021-08-31 DIAGNOSIS — N81.10 FEMALE BLADDER PROLAPSE: ICD-10-CM

## 2021-08-31 DIAGNOSIS — M05.9 SEROPOSITIVE RHEUMATOID ARTHRITIS (HCC): ICD-10-CM

## 2021-08-31 DIAGNOSIS — E78.2 MIXED HYPERLIPIDEMIA: ICD-10-CM

## 2021-08-31 DIAGNOSIS — M17.0 PRIMARY OSTEOARTHRITIS OF KNEES, BILATERAL: ICD-10-CM

## 2021-08-31 DIAGNOSIS — Z51.81 ENCOUNTER FOR MEDICATION MONITORING: ICD-10-CM

## 2021-08-31 DIAGNOSIS — K21.9 GASTROESOPHAGEAL REFLUX DISEASE WITHOUT ESOPHAGITIS: ICD-10-CM

## 2021-08-31 DIAGNOSIS — Z12.31 VISIT FOR SCREENING MAMMOGRAM: ICD-10-CM

## 2021-08-31 DIAGNOSIS — R73.03 PREDIABETES: ICD-10-CM

## 2021-08-31 DIAGNOSIS — G47.33 OSA ON CPAP: ICD-10-CM

## 2021-08-31 DIAGNOSIS — Z01.419 WELL WOMAN EXAM WITH ROUTINE GYNECOLOGICAL EXAM: Primary | ICD-10-CM

## 2021-08-31 DIAGNOSIS — I10 PRIMARY HYPERTENSION: ICD-10-CM

## 2021-08-31 PROCEDURE — 99213 OFFICE O/P EST LOW 20 MIN: CPT | Performed by: FAMILY MEDICINE

## 2021-08-31 PROCEDURE — 3078F DIAST BP <80 MM HG: CPT | Performed by: FAMILY MEDICINE

## 2021-08-31 PROCEDURE — 3008F BODY MASS INDEX DOCD: CPT | Performed by: FAMILY MEDICINE

## 2021-08-31 PROCEDURE — 99396 PREV VISIT EST AGE 40-64: CPT | Performed by: FAMILY MEDICINE

## 2021-08-31 PROCEDURE — 90471 IMMUNIZATION ADMIN: CPT | Performed by: FAMILY MEDICINE

## 2021-08-31 PROCEDURE — 90715 TDAP VACCINE 7 YRS/> IM: CPT | Performed by: FAMILY MEDICINE

## 2021-08-31 PROCEDURE — 3074F SYST BP LT 130 MM HG: CPT | Performed by: FAMILY MEDICINE

## 2021-08-31 RX ORDER — METFORMIN HYDROCHLORIDE 500 MG/1
500 TABLET, EXTENDED RELEASE ORAL DAILY
Qty: 90 TABLET | Refills: 1 | Status: SHIPPED | OUTPATIENT
Start: 2021-08-31 | End: 2021-11-24

## 2021-08-31 NOTE — PROGRESS NOTES
Shereen Carvajal is a 62year old female. HPI:  Pt is here for routine physical/WWE   Overall doing well. Has noted some weight gain. Trying to eat healthy. No regular exercise. S/p L TKR 12/2020  cont to have some pain and swelling.    Swelling and daily.     • cetirizine 10 MG Oral Tab Take 10 mg by mouth daily.            Past Medical History:   Diagnosis Date   • Anxiety    • Cataracts, bilateral    • Diabetes ([address]     Pre diabetes   • Disorder of liver     fatty liver   • Esophageal reflux    • H kg)    GENERAL: well developed, well nourished,in no apparent distress, pleasant affect  SKIN: Hyperpigmented papular nevus on face/nose.  Well-demarcated.  Stable  Healed 1.2 inch scar noted in right upper inner thigh (from cyst/abscess removal many years Laboratory examination ordered as part of a complete physical examination  - CBC WITH DIFFERENTIAL WITH PLATELET  - COMP METABOLIC PANEL (14)  - LIPID PANEL  - TSH W REFLEX TO FREE T4  - HEMOGLOBIN A1C    3.  Visit for screening mammogram  Mammogram ordered monitoring

## 2021-08-31 NOTE — PATIENT INSTRUCTIONS
Recommend shingles vaccine series. Annual flu vaccine recommended in 1-2 months. Schedule appointments with orthopedics, rheumatologist and Urogynecologist.    Schedule appointment for mammogram and fasting labs as well.

## 2021-09-04 ENCOUNTER — HOSPITAL ENCOUNTER (OUTPATIENT)
Dept: MAMMOGRAPHY | Facility: HOSPITAL | Age: 61
Discharge: HOME | End: 2021-09-04
Attending: FAMILY MEDICINE
Payer: COMMERCIAL

## 2021-09-04 ENCOUNTER — LAB ENCOUNTER (OUTPATIENT)
Dept: LAB | Facility: HOSPITAL | Age: 61
End: 2021-09-04
Attending: FAMILY MEDICINE
Payer: COMMERCIAL

## 2021-09-04 DIAGNOSIS — Z12.31 ENCOUNTER FOR SCREENING MAMMOGRAM FOR BREAST CANCER: ICD-10-CM

## 2021-09-04 LAB
ALBUMIN SERPL-MCNC: 3.6 G/DL (ref 3.4–5)
ALBUMIN/GLOB SERPL: 0.9 {RATIO} (ref 1–2)
ALP LIVER SERPL-CCNC: 97 U/L
ALT SERPL-CCNC: 23 U/L
ANION GAP SERPL CALC-SCNC: 2 MMOL/L (ref 0–18)
AST SERPL-CCNC: 18 U/L (ref 15–37)
BASOPHILS # BLD AUTO: 0.05 X10(3) UL (ref 0–0.2)
BASOPHILS NFR BLD AUTO: 0.6 %
BILIRUB SERPL-MCNC: 0.8 MG/DL (ref 0.1–2)
BUN BLD-MCNC: 15 MG/DL (ref 7–18)
CALCIUM BLD-MCNC: 9.4 MG/DL (ref 8.5–10.1)
CHLORIDE SERPL-SCNC: 108 MMOL/L (ref 98–112)
CHOLEST SMN-MCNC: 240 MG/DL (ref ?–200)
CO2 SERPL-SCNC: 30 MMOL/L (ref 21–32)
CREAT BLD-MCNC: 0.88 MG/DL
CRP SERPL-MCNC: 2.19 MG/DL (ref ?–0.3)
EOSINOPHIL # BLD AUTO: 0.45 X10(3) UL (ref 0–0.7)
EOSINOPHIL NFR BLD AUTO: 5.2 %
ERYTHROCYTE [DISTWIDTH] IN BLOOD BY AUTOMATED COUNT: 13.8 %
EST. AVERAGE GLUCOSE BLD GHB EST-MCNC: 137 MG/DL (ref 68–126)
GLOBULIN PLAS-MCNC: 4.1 G/DL (ref 2.8–4.4)
GLUCOSE BLD-MCNC: 118 MG/DL (ref 70–99)
HBA1C MFR BLD HPLC: 6.4 % (ref ?–5.7)
HCT VFR BLD AUTO: 37.6 %
HDLC SERPL-MCNC: 88 MG/DL (ref 40–59)
HGB BLD-MCNC: 12.2 G/DL
IMM GRANULOCYTES # BLD AUTO: 0.03 X10(3) UL (ref 0–1)
IMM GRANULOCYTES NFR BLD: 0.3 %
LDLC SERPL CALC-MCNC: 129 MG/DL (ref ?–100)
LYMPHOCYTES # BLD AUTO: 2.61 X10(3) UL (ref 1–4)
LYMPHOCYTES NFR BLD AUTO: 30.2 %
M PROTEIN MFR SERPL ELPH: 7.7 G/DL (ref 6.4–8.2)
MCH RBC QN AUTO: 27.2 PG (ref 26–34)
MCHC RBC AUTO-ENTMCNC: 32.4 G/DL (ref 31–37)
MCV RBC AUTO: 83.9 FL
MONOCYTES # BLD AUTO: 0.69 X10(3) UL (ref 0.1–1)
MONOCYTES NFR BLD AUTO: 8 %
NEUTROPHILS # BLD AUTO: 4.82 X10 (3) UL (ref 1.5–7.7)
NEUTROPHILS # BLD AUTO: 4.82 X10(3) UL (ref 1.5–7.7)
NEUTROPHILS NFR BLD AUTO: 55.7 %
NONHDLC SERPL-MCNC: 152 MG/DL (ref ?–130)
OSMOLALITY SERPL CALC.SUM OF ELEC: 292 MOSM/KG (ref 275–295)
PATIENT FASTING Y/N/NP: YES
PATIENT FASTING Y/N/NP: YES
PLATELET # BLD AUTO: 265 10(3)UL (ref 150–450)
POTASSIUM SERPL-SCNC: 4.2 MMOL/L (ref 3.5–5.1)
RBC # BLD AUTO: 4.48 X10(6)UL
SED RATE-ML: 30 MM/HR
SODIUM SERPL-SCNC: 140 MMOL/L (ref 136–145)
TRIGL SERPL-MCNC: 136 MG/DL (ref 30–149)
TSI SER-ACNC: 2.15 MIU/ML (ref 0.36–3.74)
VLDLC SERPL CALC-MCNC: 24 MG/DL (ref 0–30)
WBC # BLD AUTO: 8.7 X10(3) UL (ref 4–11)

## 2021-09-04 PROCEDURE — 86140 C-REACTIVE PROTEIN: CPT | Performed by: FAMILY MEDICINE

## 2021-09-04 PROCEDURE — 77067 SCR MAMMO BI INCL CAD: CPT | Performed by: FAMILY MEDICINE

## 2021-09-04 PROCEDURE — 3044F HG A1C LEVEL LT 7.0%: CPT | Performed by: FAMILY MEDICINE

## 2021-09-04 PROCEDURE — 80050 GENERAL HEALTH PANEL: CPT | Performed by: FAMILY MEDICINE

## 2021-09-04 PROCEDURE — 85652 RBC SED RATE AUTOMATED: CPT | Performed by: FAMILY MEDICINE

## 2021-09-04 PROCEDURE — 83036 HEMOGLOBIN GLYCOSYLATED A1C: CPT | Performed by: FAMILY MEDICINE

## 2021-09-04 PROCEDURE — 77063 BREAST TOMOSYNTHESIS BI: CPT | Performed by: FAMILY MEDICINE

## 2021-09-04 PROCEDURE — 80061 LIPID PANEL: CPT | Performed by: FAMILY MEDICINE

## 2021-09-09 ENCOUNTER — TELEPHONE (OUTPATIENT)
Dept: FAMILY MEDICINE CLINIC | Facility: CLINIC | Age: 61
End: 2021-09-09

## 2021-09-09 NOTE — TELEPHONE ENCOUNTER
LVM (OK per consent) with normal results and informed that they should be receiving a letter in the mail with results and recommendations to repeat test in one year.

## 2021-10-19 ENCOUNTER — TELEPHONE (OUTPATIENT)
Dept: FAMILY MEDICINE CLINIC | Facility: CLINIC | Age: 61
End: 2021-10-19

## 2021-10-19 NOTE — TELEPHONE ENCOUNTER
Pt's spouse called asking for an appointment fpr Pt. Both of her ears are draining fluid & are painful.

## 2021-10-19 NOTE — TELEPHONE ENCOUNTER
Called and spoke to patient's  who states for the past three days patient has states she has bilateral ear pain with some watery drainage. Denies fever, runny nose, cough, sore throat or congestion.   States he believes she has mentioned this to

## 2021-10-22 ENCOUNTER — OFFICE VISIT (OUTPATIENT)
Dept: FAMILY MEDICINE CLINIC | Facility: CLINIC | Age: 61
End: 2021-10-22
Payer: COMMERCIAL

## 2021-10-22 VITALS
DIASTOLIC BLOOD PRESSURE: 80 MMHG | OXYGEN SATURATION: 99 % | TEMPERATURE: 97 F | HEART RATE: 85 BPM | RESPIRATION RATE: 16 BRPM | HEIGHT: 61 IN | SYSTOLIC BLOOD PRESSURE: 138 MMHG | WEIGHT: 197 LBS | BODY MASS INDEX: 37.19 KG/M2

## 2021-10-22 DIAGNOSIS — E78.2 MIXED HYPERLIPIDEMIA: ICD-10-CM

## 2021-10-22 DIAGNOSIS — H90.3 SENSORINEURAL HEARING LOSS, BILATERAL: ICD-10-CM

## 2021-10-22 DIAGNOSIS — I10 ESSENTIAL HYPERTENSION: ICD-10-CM

## 2021-10-22 DIAGNOSIS — K21.9 GASTROESOPHAGEAL REFLUX DISEASE WITHOUT ESOPHAGITIS: ICD-10-CM

## 2021-10-22 DIAGNOSIS — H74.8X9 SCAR OF MIDDLE EAR: ICD-10-CM

## 2021-10-22 DIAGNOSIS — H65.193 ACUTE OTITIS MEDIA WITH EFFUSION OF BOTH EARS: Primary | ICD-10-CM

## 2021-10-22 DIAGNOSIS — Z51.81 ENCOUNTER FOR MEDICATION MONITORING: ICD-10-CM

## 2021-10-22 DIAGNOSIS — M05.9 SEROPOSITIVE RHEUMATOID ARTHRITIS (HCC): ICD-10-CM

## 2021-10-22 DIAGNOSIS — F43.21 GRIEF REACTION: ICD-10-CM

## 2021-10-22 DIAGNOSIS — J01.00 ACUTE NON-RECURRENT MAXILLARY SINUSITIS: ICD-10-CM

## 2021-10-22 DIAGNOSIS — R73.03 PREDIABETES: ICD-10-CM

## 2021-10-22 PROCEDURE — 3008F BODY MASS INDEX DOCD: CPT | Performed by: FAMILY MEDICINE

## 2021-10-22 PROCEDURE — 3079F DIAST BP 80-89 MM HG: CPT | Performed by: FAMILY MEDICINE

## 2021-10-22 PROCEDURE — 99214 OFFICE O/P EST MOD 30 MIN: CPT | Performed by: FAMILY MEDICINE

## 2021-10-22 PROCEDURE — 3075F SYST BP GE 130 - 139MM HG: CPT | Performed by: FAMILY MEDICINE

## 2021-10-22 RX ORDER — AMOXICILLIN AND CLAVULANATE POTASSIUM 875; 125 MG/1; MG/1
1 TABLET, FILM COATED ORAL 2 TIMES DAILY
Qty: 20 TABLET | Refills: 0 | Status: SHIPPED | OUTPATIENT
Start: 2021-10-22 | End: 2021-11-01

## 2021-10-22 RX ORDER — LISINOPRIL AND HYDROCHLOROTHIAZIDE 12.5; 1 MG/1; MG/1
1 TABLET ORAL DAILY
Qty: 90 TABLET | Refills: 2 | Status: SHIPPED | OUTPATIENT
Start: 2021-10-22

## 2021-10-22 RX ORDER — FLUTICASONE PROPIONATE 50 MCG
2 SPRAY, SUSPENSION (ML) NASAL DAILY
Qty: 16 G | Refills: 1 | Status: SHIPPED | OUTPATIENT
Start: 2021-10-22 | End: 2022-10-17

## 2021-10-22 NOTE — PROGRESS NOTES
Rosalva Parker is a 62year old female. HPI:  Started with bilat ear pain for about 2 weeks. Then R ear drainage for 4 days. Looked like yellow/pus drainage. No blood noted. No fevers. Sinus pressure with mild headache.   Chronic R hearing loss prn      • Cholecalciferol 50 MCG (2000 UT) Oral Tab Take 5,000 Units by mouth daily.      •       •             Past Medical History:   Diagnosis Date   • Anxiety    • Cataracts, bilateral    • Diabetes ([facility] [address]     Pre diabetes   • Disorder of liver     fatt kg)    GENERAL: well developed, well nourished,in no apparent distress, pleasant affect but intermittently tearful when talking about recent family tragedy.   SKIN: no rashes,no suspicious lesions  HEENT: atraumatic, normocephalic,  Conj clear  TMs: PANCHO COBOS wi dietary triggers. PPI as needed works well. 9. Seropositive rheumatoid arthritis (HCC)  Sed rate and CRP elevated with previous labs. Has appointment scheduled with rheumatologist.    10. Encounter for medication monitoring    11.   Grief reaction  Cosme Morris

## 2021-10-26 PROBLEM — M25.562 ACUTE PAIN OF LEFT KNEE: Status: RESOLVED | Noted: 2020-12-09 | Resolved: 2021-10-26

## 2021-10-26 RX ORDER — ANTIARTHRITIC COMBINATION NO.2 900 MG
TABLET ORAL DAILY
COMMUNITY

## 2021-10-26 RX ORDER — IBUPROFEN, ACETAMINOPHEN 125; 250 MG/1; MG/1
TABLET, FILM COATED ORAL DAILY PRN
COMMUNITY

## 2021-10-26 RX ORDER — LORATADINE 10 MG/1
10 TABLET ORAL DAILY
COMMUNITY

## 2021-10-26 RX ORDER — VIT A/VIT C/VIT E/ZINC/COPPER 2148-113
TABLET ORAL DAILY
COMMUNITY

## 2021-11-23 ENCOUNTER — OFFICE VISIT (OUTPATIENT)
Dept: FAMILY MEDICINE CLINIC | Facility: CLINIC | Age: 61
End: 2021-11-23
Payer: COMMERCIAL

## 2021-11-23 VITALS
OXYGEN SATURATION: 98 % | HEART RATE: 92 BPM | RESPIRATION RATE: 16 BRPM | WEIGHT: 201 LBS | HEIGHT: 63 IN | SYSTOLIC BLOOD PRESSURE: 114 MMHG | BODY MASS INDEX: 35.61 KG/M2 | DIASTOLIC BLOOD PRESSURE: 60 MMHG | TEMPERATURE: 97 F

## 2021-11-23 DIAGNOSIS — R73.03 PREDIABETES: ICD-10-CM

## 2021-11-23 DIAGNOSIS — H90.A21 SENSORINEURAL HEARING LOSS (SNHL) OF RIGHT EAR WITH RESTRICTED HEARING OF LEFT EAR: ICD-10-CM

## 2021-11-23 DIAGNOSIS — M17.0 PRIMARY OSTEOARTHRITIS OF KNEES, BILATERAL: ICD-10-CM

## 2021-11-23 DIAGNOSIS — Z86.69 HISTORY OF OTITIS MEDIA: ICD-10-CM

## 2021-11-23 DIAGNOSIS — H81.13 BPPV (BENIGN PAROXYSMAL POSITIONAL VERTIGO), BILATERAL: Primary | ICD-10-CM

## 2021-11-23 DIAGNOSIS — N39.41 URGE INCONTINENCE: ICD-10-CM

## 2021-11-23 DIAGNOSIS — Z86.69 HISTORY OF OTITIS EXTERNA: ICD-10-CM

## 2021-11-23 DIAGNOSIS — Z96.652 S/P TKR (TOTAL KNEE REPLACEMENT), LEFT: ICD-10-CM

## 2021-11-23 DIAGNOSIS — Z51.81 ENCOUNTER FOR MEDICATION MONITORING: ICD-10-CM

## 2021-11-23 DIAGNOSIS — K21.9 GASTROESOPHAGEAL REFLUX DISEASE WITHOUT ESOPHAGITIS: ICD-10-CM

## 2021-11-23 DIAGNOSIS — E78.2 MIXED HYPERLIPIDEMIA: ICD-10-CM

## 2021-11-23 PROCEDURE — 99214 OFFICE O/P EST MOD 30 MIN: CPT | Performed by: FAMILY MEDICINE

## 2021-11-23 PROCEDURE — 3078F DIAST BP <80 MM HG: CPT | Performed by: FAMILY MEDICINE

## 2021-11-23 PROCEDURE — 3008F BODY MASS INDEX DOCD: CPT | Performed by: FAMILY MEDICINE

## 2021-11-23 PROCEDURE — 3074F SYST BP LT 130 MM HG: CPT | Performed by: FAMILY MEDICINE

## 2021-11-23 RX ORDER — OMEPRAZOLE 20 MG/1
20 CAPSULE, DELAYED RELEASE ORAL
Qty: 90 CAPSULE | Refills: 2 | Status: SHIPPED | OUTPATIENT
Start: 2021-11-23

## 2021-11-23 RX ORDER — FAMOTIDINE 20 MG/1
TABLET ORAL
Qty: 30 TABLET | Refills: 1 | Status: SHIPPED | OUTPATIENT
Start: 2021-11-23 | End: 2022-02-01

## 2021-11-23 RX ORDER — MECLIZINE HCL 12.5 MG/1
TABLET ORAL
Qty: 30 TABLET | Refills: 0 | Status: SHIPPED | OUTPATIENT
Start: 2021-11-23

## 2021-11-23 NOTE — PROGRESS NOTES
Ray Buckner is a 62year old female. HPI:  Patient complains of intermittent dizziness since yesterday. Notes sxs when changing head positiions bilaterally and even if looking up or down. Some nausea associated with symptoms. No vomiting.   Bolt.io daily. 90 tablet 2   • rosuvastatin 5 MG Oral Tab Take 1 tablet (5 mg total) by mouth nightly. 90 tablet 0   • ASPIRIN 81 OR Take by mouth daily. • metFORMIN HCl  MG Oral Tablet 24 Hr Take 1 tablet (500 mg total) by mouth daily.  Take with food 90 prolapse. To see Urologist. No dysuria.   NEURO: As above    EXAM:  /60   Pulse 92   Temp 97.2 °F (36.2 °C) (Temporal)   Resp 16   Ht 5' 3\" (1.6 m)   Wt 201 lb (91.2 kg)   SpO2 98%   BMI 35.61 kg/m²   Wt Readings from Last 6 Encounters:  11/23/21 : daily.  - COMP METABOLIC PANEL (14); Future  - HEMOGLOBIN A1C; Future    3. Gastroesophageal reflux disease without esophagitis  Avoid possible dietary triggers. Small, frequent meals. Avoid lying supine for at least 3 hours after eating.   Having some br

## 2021-11-23 NOTE — PATIENT INSTRUCTIONS
Increase Metformin to twice daily (breakfast and dinner)    Take Omeprazole in AM 30 minutes before breakfast    Take Famotidine 20mg  In PM with dinner    Meclizine 1 in AM and  2 in PM as directed

## 2021-11-24 ENCOUNTER — TELEPHONE (OUTPATIENT)
Dept: FAMILY MEDICINE CLINIC | Facility: CLINIC | Age: 61
End: 2021-11-24

## 2021-11-24 DIAGNOSIS — R73.03 PREDIABETES: ICD-10-CM

## 2021-11-24 RX ORDER — METFORMIN HYDROCHLORIDE 500 MG/1
500 TABLET, EXTENDED RELEASE ORAL 2 TIMES DAILY WITH MEALS
Qty: 90 TABLET | Refills: 0 | Status: SHIPPED | OUTPATIENT
Start: 2021-11-24

## 2021-11-24 NOTE — TELEPHONE ENCOUNTER
Called patient's  and reviewed medication instructions per Dr. Asher Turcios note 11/23/21. He is unsure if patient needs a refill for the metformin. Advised we will check with pharmacy to see when last refill dispensed.   [address], they s

## 2021-11-28 PROBLEM — N39.41 URGE INCONTINENCE: Status: ACTIVE | Noted: 2021-11-28

## 2021-12-08 NOTE — TELEPHONE ENCOUNTER
LOV 11/23/2021      LAST LAB 9/4/2021    LAST RX  rosuvastatin 5 MG Oral Tab 90 tablet 0 9/27/2021         Next OV   Future Appointments   Date Time Provider Lilliana Shelby   12/20/2021 10:40 AM Valerio Almonte MD [facility]   12/21/2021 10:45 AM Cristela Barragan MD [facility]   1/4/2022  9:00 AM [facility]   2/1/2022  1:00 PM Gene Zuleta MD [facility]   2/8/2022 10:00 AM Mary Finney MD [facility] [address]   2/9/2022  2:20 PM SYD [facility] [address]   9/12/2022 12:30 PM Ana Hernandez MD [address] [place]

## 2021-12-11 DIAGNOSIS — E78.2 MIXED HYPERLIPIDEMIA: Primary | ICD-10-CM

## 2021-12-11 RX ORDER — ROSUVASTATIN CALCIUM 5 MG/1
5 TABLET, COATED ORAL NIGHTLY
Qty: 90 TABLET | Refills: 0 | Status: SHIPPED | OUTPATIENT
Start: 2021-12-11

## 2021-12-11 NOTE — TELEPHONE ENCOUNTER
Cholesterol Medication Protocol Passed 12/11/2021 11:37 AM   Protocol Details  ALT < 80    ALT resulted within past year    Lipid panel within past 12 months    Appointment within past 12 or next 3 months

## 2021-12-15 PROCEDURE — 3044F HG A1C LEVEL LT 7.0%: CPT | Performed by: FAMILY MEDICINE

## 2021-12-20 ENCOUNTER — OFFICE VISIT (OUTPATIENT)
Dept: FAMILY MEDICINE CLINIC | Facility: CLINIC | Age: 61
End: 2021-12-20
Payer: COMMERCIAL

## 2021-12-20 VITALS
TEMPERATURE: 98 F | HEIGHT: 63 IN | WEIGHT: 197.25 LBS | HEART RATE: 84 BPM | SYSTOLIC BLOOD PRESSURE: 116 MMHG | BODY MASS INDEX: 34.95 KG/M2 | RESPIRATION RATE: 16 BRPM | OXYGEN SATURATION: 99 % | DIASTOLIC BLOOD PRESSURE: 64 MMHG

## 2021-12-20 DIAGNOSIS — E78.2 MIXED HYPERLIPIDEMIA: ICD-10-CM

## 2021-12-20 DIAGNOSIS — I10 PRIMARY HYPERTENSION: Primary | ICD-10-CM

## 2021-12-20 DIAGNOSIS — R00.2 PALPITATIONS: ICD-10-CM

## 2021-12-20 DIAGNOSIS — Z51.81 ENCOUNTER FOR MEDICATION MONITORING: ICD-10-CM

## 2021-12-20 DIAGNOSIS — H81.13 BPPV (BENIGN PAROXYSMAL POSITIONAL VERTIGO), BILATERAL: ICD-10-CM

## 2021-12-20 DIAGNOSIS — M05.9 SEROPOSITIVE RHEUMATOID ARTHRITIS (HCC): ICD-10-CM

## 2021-12-20 DIAGNOSIS — R01.1 HEART MURMUR: ICD-10-CM

## 2021-12-20 DIAGNOSIS — M70.52 BURSITIS OF OTHER BURSA OF LEFT KNEE: ICD-10-CM

## 2021-12-20 DIAGNOSIS — M17.12 PRIMARY OSTEOARTHRITIS OF LEFT KNEE: ICD-10-CM

## 2021-12-20 DIAGNOSIS — R73.03 PREDIABETES: ICD-10-CM

## 2021-12-20 DIAGNOSIS — K21.9 GASTROESOPHAGEAL REFLUX DISEASE WITHOUT ESOPHAGITIS: ICD-10-CM

## 2021-12-20 DIAGNOSIS — Z96.652 S/P TKR (TOTAL KNEE REPLACEMENT), LEFT: ICD-10-CM

## 2021-12-20 DIAGNOSIS — N39.46 URGE AND STRESS INCONTINENCE: ICD-10-CM

## 2021-12-20 DIAGNOSIS — D64.9 NORMOCYTIC ANEMIA: ICD-10-CM

## 2021-12-20 DIAGNOSIS — Z23 NEED FOR VACCINATION: ICD-10-CM

## 2021-12-20 PROCEDURE — 3008F BODY MASS INDEX DOCD: CPT | Performed by: FAMILY MEDICINE

## 2021-12-20 PROCEDURE — 90471 IMMUNIZATION ADMIN: CPT | Performed by: FAMILY MEDICINE

## 2021-12-20 PROCEDURE — 90686 IIV4 VACC NO PRSV 0.5 ML IM: CPT | Performed by: FAMILY MEDICINE

## 2021-12-20 PROCEDURE — 3078F DIAST BP <80 MM HG: CPT | Performed by: FAMILY MEDICINE

## 2021-12-20 PROCEDURE — 99215 OFFICE O/P EST HI 40 MIN: CPT | Performed by: FAMILY MEDICINE

## 2021-12-20 PROCEDURE — 93000 ELECTROCARDIOGRAM COMPLETE: CPT | Performed by: FAMILY MEDICINE

## 2021-12-20 PROCEDURE — 3074F SYST BP LT 130 MM HG: CPT | Performed by: FAMILY MEDICINE

## 2021-12-20 RX ORDER — ROSUVASTATIN CALCIUM 5 MG/1
TABLET, COATED ORAL
Qty: 90 TABLET | Refills: 1 | OUTPATIENT
Start: 2021-12-20

## 2021-12-20 RX ORDER — FERROUS SULFATE 325(65) MG
325 TABLET ORAL
Qty: 90 TABLET | Refills: 1 | Status: SHIPPED | OUTPATIENT
Start: 2021-12-20

## 2021-12-20 NOTE — PROGRESS NOTES
Marita Mercer is a 62year old female. HPI:  Patient is here for follow-up on medication and recent labs. Cont to have L knee pain and swelling, chronic L knee pain that affects her ambulation. History of left knee replacement 12/2020. .  Saw orth Hydroxychloroquine Sulfate 200 MG Oral Tab Take 2 tablets (400 mg total) by mouth daily. 180 tablet 0   • Multiple Vitamins-Minerals (PRESERVISION AREDS) Oral Tab Take by mouth daily.      • Ibuprofen-Acetaminophen (ADVIL DUAL ACTION) 125-250 MG Oral Tab Ta No                  REVIEW OF SYSTEMS:  GENERAL HEALTH: feels well otherwise, mood is good  SKIN: denies any unusual skin lesions or rashes  RESPIRATORY: denies shortness of breath with exertion, no cough  CARDIOVASCULAR: denies chest pain on exerti Increase iron in diet. Ferrous sulfate 325 mg daily. Stool softener daily if notes any constipation with medication. Monitor.  - CBC WITH DIFFERENTIAL WITH PLATELET  - IRON AND TIBC  - FERRITIN  - VITAMIN B12    5.  Mixed hyperlipidemia  Lipid panel revi

## 2021-12-22 ENCOUNTER — TELEPHONE (OUTPATIENT)
Dept: FAMILY MEDICINE CLINIC | Facility: CLINIC | Age: 61
End: 2021-12-22

## 2021-12-22 NOTE — TELEPHONE ENCOUNTER
Seen on Monday 12/20/21 by Dr. Femi Forde and forgot to ask if she could get the handicap placcard for her continuing knee problems.

## 2021-12-23 NOTE — TELEPHONE ENCOUNTER
Dr. David Ruff,  Please advise if this is something you can provide for patient. LOV 12/20/21   HTN   +9   12/21/21   Ortho appt.   Bursitis   +1  - script for formal PT   -  Will continue over the counter nsaids in form of 600mg ibuprofen 3 times per day fo

## 2021-12-23 NOTE — TELEPHONE ENCOUNTER
Yes, this is fine but will need pt to fill out her portion of form and sign , then can drop off to office for me to complete my portion.

## 2022-01-03 NOTE — TELEPHONE ENCOUNTER
Informed pt that she will need to sign disability form and fill out her portion . Patient agreed to stop by today . Form placed in a  folder .

## 2022-01-03 NOTE — TELEPHONE ENCOUNTER
Form completed.   pls make copy for scanning and pt needs to enter email address and sign form before submitting

## 2022-01-04 ENCOUNTER — MED REC SCAN ONLY (OUTPATIENT)
Dept: FAMILY MEDICINE CLINIC | Facility: CLINIC | Age: 62
End: 2022-01-04

## 2022-02-09 ENCOUNTER — TELEPHONE (OUTPATIENT)
Dept: FAMILY MEDICINE CLINIC | Facility: CLINIC | Age: 62
End: 2022-02-09

## 2022-02-09 NOTE — TELEPHONE ENCOUNTER
Patient's  stated that 2 items on the parking placard need to be completed. Please complete and send to  and copy for scanning.  would like to be notified when form is complete and send in a [facility] message. Placed on Dr. Dilma Appiah desk.

## 2022-02-28 RX ORDER — ROSUVASTATIN CALCIUM 5 MG/1
5 TABLET, COATED ORAL NIGHTLY
Qty: 90 TABLET | Refills: 0 | Status: SHIPPED | OUTPATIENT
Start: 2022-02-28

## 2022-02-28 NOTE — TELEPHONE ENCOUNTER
LOV 12/20/2021      LAST LAB 12/15/2021     LAST RX   rosuvastatin 5 MG Oral Tab 90 tablet 0 12/11/2021         Next OV   Future Appointments   Date Time Provider Lilliana Shelby   3/8/2022  9:10 AM Syeda Denise MD [facility]   5/18/2022 11:40 AM Nicole Krishnamurthy MD [facility]   9/12/2022 12:30 PM [facility]   1/10/2023 12:30 PM [facility]   2/15/2023 11:00 AM Mika Martínez APN [facility] - [address] [facility]

## 2022-05-09 ENCOUNTER — TELEPHONE (OUTPATIENT)
Dept: FAMILY MEDICINE CLINIC | Facility: CLINIC | Age: 62
End: 2022-05-09

## 2022-05-09 RX ORDER — METFORMIN HYDROCHLORIDE 500 MG/1
TABLET, EXTENDED RELEASE ORAL
Qty: 60 TABLET | Refills: 1 | Status: SHIPPED | OUTPATIENT
Start: 2022-05-09

## 2022-05-09 NOTE — TELEPHONE ENCOUNTER
leaving tomorrow afternoon out of town,  only has 3 left of Metformin    sent request from pharmacy already wants to be filled ASAP   advised we require 48-72 hours notice for med refills.

## 2022-05-09 NOTE — TELEPHONE ENCOUNTER
Diabetic Medication Protocol Failed 05/09/2022 11:49 AM   Protocol Details  Microalbumin procedure in past 12 months or taking ACE/ARB    HgBA1C procedure resulted in past 6 months    Last HgBA1C < 7.5    Appointment in past 6 or next 3 months     LOV  12/20/21 dr woodward    LAST LAB 12/15/21     LAST RX  11/24/21 90 tabs      Next OV No future appointments.       PROTOCOL failed

## 2022-05-24 DIAGNOSIS — I10 ESSENTIAL HYPERTENSION: ICD-10-CM

## 2022-05-24 RX ORDER — LISINOPRIL AND HYDROCHLOROTHIAZIDE 12.5; 1 MG/1; MG/1
1 TABLET ORAL DAILY
Qty: 90 TABLET | Refills: 1 | Status: SHIPPED | OUTPATIENT
Start: 2022-05-24

## 2022-05-24 NOTE — TELEPHONE ENCOUNTER
Last office visit: (if over 1 year, schedule appointment)  12/20/21    Appointment scheduled with:  Dr. Kamini Meza    Requested medication:   Lisinopril-Hydrochlorathyazide     Pharmacy:  [address], [address] [place] [address] [phone], [phone]      Patient lost medication and needs refill.   Please send today

## 2022-05-25 RX ORDER — LISINOPRIL AND HYDROCHLOROTHIAZIDE 12.5; 1 MG/1; MG/1
TABLET ORAL
Qty: 90 TABLET | Refills: 0 | OUTPATIENT
Start: 2022-05-25

## 2022-08-09 DIAGNOSIS — E78.2 MIXED HYPERLIPIDEMIA: ICD-10-CM

## 2022-08-09 RX ORDER — ROSUVASTATIN CALCIUM 5 MG/1
5 TABLET, COATED ORAL NIGHTLY
Qty: 90 TABLET | Refills: 0 | Status: SHIPPED | OUTPATIENT
Start: 2022-08-09

## 2022-08-09 NOTE — TELEPHONE ENCOUNTER
Cholesterol Medication Protocol Passed 08/09/2022 03:32 PM   Protocol Details  ALT < 80    ALT resulted within past year    Lipid panel within past 12 months    Appointment within past 12 or next 3 months        LOV 12/20/21     LAST LAB  12/15/21    LAST RX  2/28/22 90     Next OV   Future Appointments   Date Time Provider Lilliana Shelby   8/30/2022  2:40 PM Christine Mendez MD [facility]         PROTOCOL pass

## 2022-09-12 ENCOUNTER — OFFICE VISIT (OUTPATIENT)
Dept: FAMILY MEDICINE CLINIC | Facility: CLINIC | Age: 62
End: 2022-09-12
Payer: COMMERCIAL

## 2022-09-12 VITALS
OXYGEN SATURATION: 95 % | HEIGHT: 63 IN | RESPIRATION RATE: 16 BRPM | HEART RATE: 86 BPM | TEMPERATURE: 97 F | SYSTOLIC BLOOD PRESSURE: 132 MMHG | BODY MASS INDEX: 34.91 KG/M2 | WEIGHT: 197 LBS | DIASTOLIC BLOOD PRESSURE: 78 MMHG

## 2022-09-12 DIAGNOSIS — R73.03 PREDIABETES: ICD-10-CM

## 2022-09-12 DIAGNOSIS — I10 PRIMARY HYPERTENSION: Primary | ICD-10-CM

## 2022-09-12 DIAGNOSIS — K21.9 GASTROESOPHAGEAL REFLUX DISEASE WITHOUT ESOPHAGITIS: ICD-10-CM

## 2022-09-12 DIAGNOSIS — R06.09 DOE (DYSPNEA ON EXERTION): ICD-10-CM

## 2022-09-12 DIAGNOSIS — D64.9 NORMOCYTIC ANEMIA: ICD-10-CM

## 2022-09-12 DIAGNOSIS — G47.09 OTHER INSOMNIA: ICD-10-CM

## 2022-09-12 DIAGNOSIS — E55.9 VITAMIN D DEFICIENCY: ICD-10-CM

## 2022-09-12 DIAGNOSIS — Z51.81 ENCOUNTER FOR MEDICATION MONITORING: ICD-10-CM

## 2022-09-12 DIAGNOSIS — M05.9 SEROPOSITIVE RHEUMATOID ARTHRITIS (HCC): ICD-10-CM

## 2022-09-12 DIAGNOSIS — M17.12 PRIMARY OSTEOARTHRITIS OF LEFT KNEE: ICD-10-CM

## 2022-09-12 DIAGNOSIS — E78.2 MIXED HYPERLIPIDEMIA: ICD-10-CM

## 2022-09-12 DIAGNOSIS — Z99.89 OSA ON CPAP: ICD-10-CM

## 2022-09-12 DIAGNOSIS — R00.2 PALPITATIONS: ICD-10-CM

## 2022-09-12 DIAGNOSIS — G47.33 OSA ON CPAP: ICD-10-CM

## 2022-09-12 PROCEDURE — 3078F DIAST BP <80 MM HG: CPT | Performed by: FAMILY MEDICINE

## 2022-09-12 PROCEDURE — 99215 OFFICE O/P EST HI 40 MIN: CPT | Performed by: FAMILY MEDICINE

## 2022-09-12 PROCEDURE — 3075F SYST BP GE 130 - 139MM HG: CPT | Performed by: FAMILY MEDICINE

## 2022-09-12 PROCEDURE — 3008F BODY MASS INDEX DOCD: CPT | Performed by: FAMILY MEDICINE

## 2022-09-12 RX ORDER — ZOLPIDEM TARTRATE 10 MG/1
10 TABLET ORAL NIGHTLY PRN
Qty: 30 TABLET | Refills: 0 | Status: SHIPPED | OUTPATIENT
Start: 2022-09-12

## 2022-09-12 RX ORDER — FERROUS SULFATE 325(65) MG
325 TABLET ORAL
Qty: 90 TABLET | Refills: 1 | Status: SHIPPED | OUTPATIENT
Start: 2022-09-12

## 2022-09-12 RX ORDER — METFORMIN HYDROCHLORIDE 500 MG/1
500 TABLET, EXTENDED RELEASE ORAL 2 TIMES DAILY WITH MEALS
Qty: 180 TABLET | Refills: 1 | Status: SHIPPED | OUTPATIENT
Start: 2022-09-12

## 2022-09-12 RX ORDER — OMEPRAZOLE 20 MG/1
20 CAPSULE, DELAYED RELEASE ORAL
Qty: 90 CAPSULE | Refills: 2 | Status: SHIPPED | OUTPATIENT
Start: 2022-09-12

## 2022-09-19 ENCOUNTER — TELEPHONE (OUTPATIENT)
Dept: FAMILY MEDICINE CLINIC | Facility: CLINIC | Age: 62
End: 2022-09-19

## 2022-09-19 NOTE — TELEPHONE ENCOUNTER
Detailed VMML for patient's  advising it is not and EKG that Dr. Marlo Nagy wants patient to do, it is the Echocardiogram ordered in December. Advised order is already in [address] Instructed to call Central Scheduling at [phone] to make appt. Advised to return call with any questions.

## 2022-09-19 NOTE — TELEPHONE ENCOUNTER
Pt pt instructions:  Get echocardiogram done as previously ordered. (Does not need EKG)  Please give patient information to schedule appointment.

## 2022-09-20 PROCEDURE — 3044F HG A1C LEVEL LT 7.0%: CPT | Performed by: FAMILY MEDICINE

## 2022-09-21 LAB
% SATURATION: 13 % (CALC) (ref 16–45)
ABSOLUTE BASOPHILS: 63 CELLS/UL (ref 0–200)
ABSOLUTE EOSINOPHILS: 297 CELLS/UL (ref 15–500)
ABSOLUTE LYMPHOCYTES: 3654 CELLS/UL (ref 850–3900)
ABSOLUTE MONOCYTES: 630 CELLS/UL (ref 200–950)
ABSOLUTE NEUTROPHILS: 4356 CELLS/UL (ref 1500–7800)
ALBUMIN/GLOBULIN RATIO: 1.6 (CALC) (ref 1–2.5)
ALBUMIN: 4.1 G/DL (ref 3.6–5.1)
ALKALINE PHOSPHATASE: 71 U/L (ref 37–153)
ALT: 30 U/L (ref 6–29)
AST: 27 U/L (ref 10–35)
BASOPHILS: 0.7 %
BILIRUBIN, TOTAL: 0.5 MG/DL (ref 0.2–1.2)
BUN: 20 MG/DL (ref 7–25)
CALCIUM: 9.9 MG/DL (ref 8.6–10.4)
CARBON DIOXIDE: 32 MMOL/L (ref 20–32)
CHLORIDE: 103 MMOL/L (ref 98–110)
CHOL/HDLC RATIO: 2.7 (CALC)
CHOLESTEROL, TOTAL: 221 MG/DL
CREATININE: 0.68 MG/DL (ref 0.5–1.05)
EGFR: 100 ML/MIN/1.73M2
EOSINOPHILS: 3.3 %
FERRITIN: 32 NG/ML (ref 16–232)
GLOBULIN: 2.6 G/DL (CALC) (ref 1.9–3.7)
GLUCOSE: 119 MG/DL (ref 65–99)
HDL CHOLESTEROL: 83 MG/DL
HEMATOCRIT: 33.7 % (ref 35–45)
HEMOGLOBIN A1C: 6.1 % OF TOTAL HGB
HEMOGLOBIN: 11.3 G/DL (ref 11.7–15.5)
IRON BINDING CAPACITY: 411 MCG/DL (CALC) (ref 250–450)
IRON, TOTAL: 52 MCG/DL (ref 45–160)
LDL-CHOLESTEROL: 111 MG/DL (CALC)
LYMPHOCYTES: 40.6 %
MCH: 27.8 PG (ref 27–33)
MCHC: 33.5 G/DL (ref 32–36)
MCV: 82.8 FL (ref 80–100)
MONOCYTES: 7 %
MPV: 10.9 FL (ref 7.5–12.5)
NEUTROPHILS: 48.4 %
NON-HDL CHOLESTEROL: 138 MG/DL (CALC)
PLATELET COUNT: 297 THOUSAND/UL (ref 140–400)
POTASSIUM: 4 MMOL/L (ref 3.5–5.3)
PROTEIN, TOTAL: 6.7 G/DL (ref 6.1–8.1)
RDW: 13.6 % (ref 11–15)
RED BLOOD CELL COUNT: 4.07 MILLION/UL (ref 3.8–5.1)
SODIUM: 142 MMOL/L (ref 135–146)
TRIGLYCERIDES: 152 MG/DL
TSH W/REFLEX TO FT4: 2.91 MIU/L (ref 0.4–4.5)
VITAMIN B12: 336 PG/ML (ref 200–1100)
VITAMIN D, 25-OH, TOTAL: 28 NG/ML (ref 30–100)
WHITE BLOOD CELL COUNT: 9 THOUSAND/UL (ref 3.8–10.8)

## 2022-10-17 ENCOUNTER — TELEPHONE (OUTPATIENT)
Dept: FAMILY MEDICINE CLINIC | Facility: CLINIC | Age: 62
End: 2022-10-17

## 2022-10-17 ENCOUNTER — OFFICE VISIT (OUTPATIENT)
Dept: FAMILY MEDICINE CLINIC | Facility: CLINIC | Age: 62
End: 2022-10-17
Payer: COMMERCIAL

## 2022-10-17 VITALS
OXYGEN SATURATION: 99 % | WEIGHT: 198 LBS | TEMPERATURE: 98 F | HEIGHT: 63 IN | RESPIRATION RATE: 16 BRPM | HEART RATE: 80 BPM | BODY MASS INDEX: 35.08 KG/M2 | DIASTOLIC BLOOD PRESSURE: 60 MMHG | SYSTOLIC BLOOD PRESSURE: 124 MMHG

## 2022-10-17 DIAGNOSIS — J06.9 VIRAL UPPER RESPIRATORY TRACT INFECTION WITH COUGH: Primary | ICD-10-CM

## 2022-10-17 PROCEDURE — 3008F BODY MASS INDEX DOCD: CPT | Performed by: FAMILY MEDICINE

## 2022-10-17 PROCEDURE — 3078F DIAST BP <80 MM HG: CPT | Performed by: FAMILY MEDICINE

## 2022-10-17 PROCEDURE — 3074F SYST BP LT 130 MM HG: CPT | Performed by: FAMILY MEDICINE

## 2022-10-17 PROCEDURE — 99213 OFFICE O/P EST LOW 20 MIN: CPT | Performed by: FAMILY MEDICINE

## 2022-10-17 RX ORDER — BENZONATATE 200 MG/1
200 CAPSULE ORAL 3 TIMES DAILY PRN
Qty: 30 CAPSULE | Refills: 0 | Status: SHIPPED | OUTPATIENT
Start: 2022-10-17

## 2022-10-17 NOTE — TELEPHONE ENCOUNTER
Attempted to speak to patient's  regarding symptoms, but when call transferred, he did not answer.     Future Appointments   Date Time Provider Lilliana Shelby   10/17/2022  1:00 PM Igor Zaldivar DO [facility]   11/7/2022 12:40 PM Varsha Lucas MD [facility]

## 2022-10-17 NOTE — TELEPHONE ENCOUNTER
called saying his wife has been coughing for about 4 -5 days. Tested negative for covid yesterday. Is taking OTC medicine Nyquil at night with no help. No other symptoms. Has not been able to sleep at night. Her daughter is coming in at 11:40 am today for Dr Brokc Weber. Please triage and advise.

## 2022-11-07 ENCOUNTER — HOSPITAL ENCOUNTER (OUTPATIENT)
Dept: GENERAL RADIOLOGY | Age: 62
Discharge: HOME | End: 2022-11-07
Attending: FAMILY MEDICINE
Payer: COMMERCIAL

## 2022-11-07 ENCOUNTER — OFFICE VISIT (OUTPATIENT)
Dept: FAMILY MEDICINE CLINIC | Facility: CLINIC | Age: 62
End: 2022-11-07
Payer: COMMERCIAL

## 2022-11-07 VITALS
HEART RATE: 88 BPM | HEIGHT: 63 IN | TEMPERATURE: 97 F | BODY MASS INDEX: 34.11 KG/M2 | OXYGEN SATURATION: 98 % | RESPIRATION RATE: 16 BRPM | DIASTOLIC BLOOD PRESSURE: 76 MMHG | SYSTOLIC BLOOD PRESSURE: 126 MMHG | WEIGHT: 192.5 LBS

## 2022-11-07 DIAGNOSIS — E55.9 VITAMIN D DEFICIENCY: ICD-10-CM

## 2022-11-07 DIAGNOSIS — R74.01 ELEVATED ALT MEASUREMENT: ICD-10-CM

## 2022-11-07 DIAGNOSIS — G43.009 MIGRAINE WITHOUT AURA AND WITHOUT STATUS MIGRAINOSUS, NOT INTRACTABLE: Primary | ICD-10-CM

## 2022-11-07 DIAGNOSIS — M17.0 BILATERAL PRIMARY OSTEOARTHRITIS OF KNEE: ICD-10-CM

## 2022-11-07 DIAGNOSIS — H81.13 BPPV (BENIGN PAROXYSMAL POSITIONAL VERTIGO), BILATERAL: ICD-10-CM

## 2022-11-07 DIAGNOSIS — N39.41 URGE INCONTINENCE: ICD-10-CM

## 2022-11-07 DIAGNOSIS — R63.4 WEIGHT LOSS: ICD-10-CM

## 2022-11-07 DIAGNOSIS — M47.892 OTHER OSTEOARTHRITIS OF SPINE, CERVICAL REGION: ICD-10-CM

## 2022-11-07 DIAGNOSIS — K21.9 GERD WITHOUT ESOPHAGITIS: ICD-10-CM

## 2022-11-07 DIAGNOSIS — Z23 NEED FOR VACCINATION: ICD-10-CM

## 2022-11-07 DIAGNOSIS — D64.9 NORMOCYTIC ANEMIA: ICD-10-CM

## 2022-11-07 DIAGNOSIS — D32.9 MENINGIOMA (HCC): ICD-10-CM

## 2022-11-07 DIAGNOSIS — E78.2 MIXED HYPERLIPIDEMIA: ICD-10-CM

## 2022-11-07 DIAGNOSIS — F51.04 CHRONIC INSOMNIA: ICD-10-CM

## 2022-11-07 DIAGNOSIS — M62.838 CERVICAL PARASPINAL MUSCLE SPASM: ICD-10-CM

## 2022-11-07 DIAGNOSIS — Z96.652 HISTORY OF TOTAL LEFT KNEE REPLACEMENT (TKR): ICD-10-CM

## 2022-11-07 DIAGNOSIS — R73.03 PREDIABETES: ICD-10-CM

## 2022-11-07 DIAGNOSIS — F41.9 ANXIETY: ICD-10-CM

## 2022-11-07 DIAGNOSIS — I10 ESSENTIAL HYPERTENSION: ICD-10-CM

## 2022-11-07 DIAGNOSIS — K76.0 FATTY LIVER: ICD-10-CM

## 2022-11-07 PROCEDURE — 90686 IIV4 VACC NO PRSV 0.5 ML IM: CPT | Performed by: FAMILY MEDICINE

## 2022-11-07 PROCEDURE — 3078F DIAST BP <80 MM HG: CPT | Performed by: FAMILY MEDICINE

## 2022-11-07 PROCEDURE — 90471 IMMUNIZATION ADMIN: CPT | Performed by: FAMILY MEDICINE

## 2022-11-07 PROCEDURE — 73562 X-RAY EXAM OF KNEE 3: CPT | Performed by: FAMILY MEDICINE

## 2022-11-07 PROCEDURE — 3008F BODY MASS INDEX DOCD: CPT | Performed by: FAMILY MEDICINE

## 2022-11-07 PROCEDURE — 3074F SYST BP LT 130 MM HG: CPT | Performed by: FAMILY MEDICINE

## 2022-11-07 PROCEDURE — 73564 X-RAY EXAM KNEE 4 OR MORE: CPT | Performed by: FAMILY MEDICINE

## 2022-11-07 PROCEDURE — 99214 OFFICE O/P EST MOD 30 MIN: CPT | Performed by: FAMILY MEDICINE

## 2022-11-07 RX ORDER — LISINOPRIL AND HYDROCHLOROTHIAZIDE 12.5; 1 MG/1; MG/1
1 TABLET ORAL DAILY
Qty: 90 TABLET | Refills: 1 | Status: SHIPPED | OUTPATIENT
Start: 2022-11-07

## 2022-11-07 RX ORDER — ROSUVASTATIN CALCIUM 5 MG/1
5 TABLET, COATED ORAL NIGHTLY
Qty: 90 TABLET | Refills: 0 | Status: SHIPPED | OUTPATIENT
Start: 2022-11-07

## 2022-11-07 RX ORDER — DIAZEPAM 5 MG/1
5 TABLET ORAL NIGHTLY PRN
Qty: 30 TABLET | Refills: 1 | Status: SHIPPED | OUTPATIENT
Start: 2022-11-07

## 2022-11-07 RX ORDER — MECLIZINE HCL 12.5 MG/1
TABLET ORAL
Qty: 30 TABLET | Refills: 1 | Status: SHIPPED | OUTPATIENT
Start: 2022-11-07

## 2022-11-07 NOTE — PATIENT INSTRUCTIONS
Increase omeprazole to 20 mg twice daily for 3 weeks, then resume once daily    Stop zolpidem (Ambien)    Take diazepam (Valium) nightly for 10 days, then nightly as needed.

## 2022-11-09 ENCOUNTER — TELEPHONE (OUTPATIENT)
Dept: ORTHOPEDICS CLINIC | Facility: CLINIC | Age: 62
End: 2022-11-09

## 2022-11-09 NOTE — TELEPHONE ENCOUNTER
Last Imaging  XR KNEE (3 VIEWS), LEFT (CPT=73562)  Narrative: PROCEDURE:  XR KNEE ROUTINE (3 VIEWS), LEFT (CPT=73562)     LOCATION:  [id number]      TECHNIQUE:  Three views were obtained including patellar view. COMPARISON:  None. INDICATIONS:  Bilateral knee pain, history of left knee arthroplasty     PATIENT STATED HISTORY: (As transcribed by Technologist)  Patient stated bilateral knee pain. Patient stated history of left knee replacement 2 years ago. She denies any injury. FINDINGS:    There is a total left knee arthroplasty which appears within normal limits. No evidence of acute osseous injuries. No soft tissue swelling or significant joint effusion suggested. Impression: CONCLUSION:    1. There is a total left knee arthroplasty which appears within normal limits. 2. There is no evidence of an acute process or significant disease. Dictated by (CST): Max Mitchell DO on 11/07/2022 at 2:50 PM       Finalized by (CST): Max Mitchell DO on 11/07/2022 at 2:51 PM     XR KNEE, COMPLETE (4 OR MORE VIEWS), RIGHT (IOK=82812)  Narrative: PROCEDURE:  XR KNEE, COMPLETE (4 OR MORE VIEWS), RIGHT (MUC=[id number])     LOCATION:  [id number]      TECHNIQUE:  AP, lateral, sunrise, and tunnel views were obtained     COMPARISON:  None. INDICATIONS:  M17.0 Bilateral primary osteoarthritis of knee     PATIENT STATED HISTORY: (As transcribed by Technologist)  Patient stated bilateral knee pain. Patient stated history of left knee replacement 2 years ago. She denies any injury. FINDINGS:    BONES:  There is no evidence of acute osseous injuries. There is tricompartmental osteoarthritis which appears moderate to severe and most pronounced in the medial compartment where there is extensive joint space loss with prominent marginal   osteophytes. SOFT TISSUES:  Negative. No visible soft tissue swelling. EFFUSION:  Mild suprapatellar joint effusion. OTHER:  Negative. Impression: CONCLUSION:    1. Moderate to severe tricompartmental osteoarthritis, most pronounced in the medial compartment. 2. Mild suprapatellar joint effusion.         Dictated by (CST): Kwesi De La Torre DO on 11/07/2022 at 2:49 PM       Finalized by (CST): Kwesi De La Torre DO on 11/07/2022 at 2:50 PM          Future Appointments   Date Time Provider [facility]   11/14/2022  2:30 PM Gaviota Arguello PA-C [facility]   12/5/2022 10:40 AM Tee Colon MD [facility]   12/7/2022  7:45 AM [facility]

## 2022-11-09 NOTE — TELEPHONE ENCOUNTER
Future Appointments   Date Time Provider Lilliana Shelby   11/14/2022  2:30 PM Miguel Rock PA-C [facility]     This patient is coming for LT Knee replacment. There was recent imaging done in epic. Please advise if additional views are needed for this appt. Thanks.       Patient can be reached at [phone]

## 2022-11-14 ENCOUNTER — OFFICE VISIT (OUTPATIENT)
Dept: ORTHOPEDICS CLINIC | Facility: CLINIC | Age: 62
End: 2022-11-14
Payer: COMMERCIAL

## 2022-11-14 VITALS — HEART RATE: 84 BPM | OXYGEN SATURATION: 100 % | BODY MASS INDEX: 34.11 KG/M2 | WEIGHT: 192.5 LBS | HEIGHT: 63 IN

## 2022-11-14 DIAGNOSIS — M17.11 PRIMARY OSTEOARTHRITIS OF RIGHT KNEE: Primary | ICD-10-CM

## 2022-11-14 DIAGNOSIS — E78.2 MIXED HYPERLIPIDEMIA: ICD-10-CM

## 2022-11-14 DIAGNOSIS — Z96.652 STATUS POST LEFT KNEE REPLACEMENT: ICD-10-CM

## 2022-11-14 RX ORDER — ROSUVASTATIN CALCIUM 5 MG/1
TABLET, COATED ORAL
Qty: 90 TABLET | Refills: 3 | OUTPATIENT
Start: 2022-11-14

## 2022-11-14 RX ORDER — TRIAMCINOLONE ACETONIDE 40 MG/ML
40 INJECTION, SUSPENSION INTRA-ARTICULAR; INTRAMUSCULAR ONCE
Status: COMPLETED | OUTPATIENT
Start: 2022-11-14 | End: 2022-11-14

## 2022-11-14 RX ORDER — MELOXICAM 15 MG/1
15 TABLET ORAL DAILY
Qty: 30 TABLET | Refills: 0 | Status: SHIPPED | OUTPATIENT
Start: 2022-11-14

## 2022-11-14 RX ADMIN — TRIAMCINOLONE ACETONIDE 40 MG: 40 INJECTION, SUSPENSION INTRA-ARTICULAR; INTRAMUSCULAR at 15:27:00

## 2022-11-14 NOTE — PROCEDURES
After informed consent, the patient's right knee was marked, locally anesthetized with skin refrigerant, prepped with topical antiseptic, and injected with a mixture of 1mL 40mg/mL Kenalog, 2mL 1% lidocaine and 2mL 0.5% marcaine through the inferolateral portal.  A band-aid was applied. The patient tolerated the procedure well.     Narendra Sutherland PA-C  [address] Orthopedic Surgery

## 2022-12-05 ENCOUNTER — OFFICE VISIT (OUTPATIENT)
Dept: FAMILY MEDICINE CLINIC | Facility: CLINIC | Age: 62
End: 2022-12-05
Payer: COMMERCIAL

## 2022-12-05 VITALS
HEIGHT: 63 IN | BODY MASS INDEX: 34.37 KG/M2 | OXYGEN SATURATION: 99 % | SYSTOLIC BLOOD PRESSURE: 128 MMHG | WEIGHT: 194 LBS | RESPIRATION RATE: 16 BRPM | HEART RATE: 93 BPM | TEMPERATURE: 97 F | DIASTOLIC BLOOD PRESSURE: 74 MMHG

## 2022-12-05 DIAGNOSIS — M47.892 OTHER OSTEOARTHRITIS OF SPINE, CERVICAL REGION: ICD-10-CM

## 2022-12-05 DIAGNOSIS — K29.00 OTHER ACUTE GASTRITIS WITHOUT HEMORRHAGE: Primary | ICD-10-CM

## 2022-12-05 DIAGNOSIS — G43.009 MIGRAINE WITHOUT AURA AND WITHOUT STATUS MIGRAINOSUS, NOT INTRACTABLE: ICD-10-CM

## 2022-12-05 DIAGNOSIS — I10 PRIMARY HYPERTENSION: ICD-10-CM

## 2022-12-05 DIAGNOSIS — K21.9 CHRONIC GERD: ICD-10-CM

## 2022-12-05 DIAGNOSIS — M17.0 BILATERAL PRIMARY OSTEOARTHRITIS OF KNEE: ICD-10-CM

## 2022-12-05 DIAGNOSIS — K76.0 FATTY LIVER: ICD-10-CM

## 2022-12-05 DIAGNOSIS — R73.03 PREDIABETES: ICD-10-CM

## 2022-12-05 PROCEDURE — 99215 OFFICE O/P EST HI 40 MIN: CPT | Performed by: FAMILY MEDICINE

## 2022-12-05 PROCEDURE — 3074F SYST BP LT 130 MM HG: CPT | Performed by: FAMILY MEDICINE

## 2022-12-05 PROCEDURE — 3008F BODY MASS INDEX DOCD: CPT | Performed by: FAMILY MEDICINE

## 2022-12-05 PROCEDURE — 3078F DIAST BP <80 MM HG: CPT | Performed by: FAMILY MEDICINE

## 2022-12-05 RX ORDER — SUCRALFATE 1 G/1
1 TABLET ORAL
Qty: 30 TABLET | Refills: 0 | Status: SHIPPED | OUTPATIENT
Start: 2022-12-05 | End: 2022-12-15

## 2022-12-05 NOTE — PATIENT INSTRUCTIONS
STOP MELOXICAM    NO IBUPROFEN    TYLENOL ARTHR ITS, 2 TABLETS TWICE DAILY AS NEEDED    CHANGE OMEPRAZOLE TO 2 TABLETS (40MG) DAILY IN ma    SUCRALFATE 3 TIMES DAILY BEFORE MEALS FOR 10 DAYS.

## 2022-12-06 ENCOUNTER — TELEPHONE (OUTPATIENT)
Dept: FAMILY MEDICINE CLINIC | Facility: CLINIC | Age: 62
End: 2022-12-06

## 2022-12-06 NOTE — TELEPHONE ENCOUNTER
Checked with [facility]  10 days supplies  At [facility] 4.93 $ [facility] pharmacy 9.96$ . [facility] [address] ct . Pt will call to confirm pharmacy .

## 2022-12-06 NOTE — TELEPHONE ENCOUNTER
Pt's spouse called stating Dr Jeannie Cervantes prescribed sucralfate (CARAFATE) 1 g Oral Tab for her yesterday. Spouse said Pharmacy told him they faxed a PA request to this office.

## 2022-12-07 ENCOUNTER — HOSPITAL ENCOUNTER (OUTPATIENT)
Dept: ULTRASOUND IMAGING | Age: 62
Discharge: HOME | End: 2022-12-07
Attending: FAMILY MEDICINE
Payer: COMMERCIAL

## 2022-12-07 DIAGNOSIS — R74.01 ELEVATED ALT MEASUREMENT: ICD-10-CM

## 2022-12-07 DIAGNOSIS — K76.0 FATTY LIVER: ICD-10-CM

## 2022-12-07 PROCEDURE — 76705 ECHO EXAM OF ABDOMEN: CPT | Performed by: FAMILY MEDICINE

## 2022-12-07 PROCEDURE — 76981 USE PARENCHYMA: CPT | Performed by: FAMILY MEDICINE

## 2023-01-06 ENCOUNTER — TELEPHONE (OUTPATIENT)
Dept: FAMILY MEDICINE CLINIC | Facility: CLINIC | Age: 63
End: 2023-01-06

## 2023-01-06 NOTE — TELEPHONE ENCOUNTER
Patient came in for her appointment today and we discovered her insurance is accepted, but PCP is listed as [address] [place] [address]. Patient and spouse explained to have it changed so Dr. Gretel Heimlich can be covered under their insurance. Scheduled appointment for next week. Patient and spouse were appreciate of help and explanation.

## 2023-01-13 ENCOUNTER — OFFICE VISIT (OUTPATIENT)
Dept: FAMILY MEDICINE CLINIC | Facility: CLINIC | Age: 63
End: 2023-01-13
Payer: COMMERCIAL

## 2023-01-13 VITALS
BODY MASS INDEX: 35.08 KG/M2 | SYSTOLIC BLOOD PRESSURE: 120 MMHG | OXYGEN SATURATION: 99 % | WEIGHT: 198 LBS | DIASTOLIC BLOOD PRESSURE: 70 MMHG | RESPIRATION RATE: 16 BRPM | TEMPERATURE: 97 F | HEART RATE: 84 BPM | HEIGHT: 63 IN

## 2023-01-13 DIAGNOSIS — L29.9 EAR ITCHING: ICD-10-CM

## 2023-01-13 DIAGNOSIS — Z98.41 HISTORY OF CATARACT EXTRACTION, RIGHT: ICD-10-CM

## 2023-01-13 DIAGNOSIS — I10 ESSENTIAL HYPERTENSION: ICD-10-CM

## 2023-01-13 DIAGNOSIS — G47.33 OSA ON CPAP: ICD-10-CM

## 2023-01-13 DIAGNOSIS — J30.89 NON-SEASONAL ALLERGIC RHINITIS DUE TO OTHER ALLERGIC TRIGGER: ICD-10-CM

## 2023-01-13 DIAGNOSIS — D64.9 NORMOCYTIC ANEMIA: ICD-10-CM

## 2023-01-13 DIAGNOSIS — H53.9 VISUAL DISTURBANCE: ICD-10-CM

## 2023-01-13 DIAGNOSIS — E78.2 MIXED HYPERLIPIDEMIA: ICD-10-CM

## 2023-01-13 DIAGNOSIS — M05.9 SEROPOSITIVE RHEUMATOID ARTHRITIS (HCC): ICD-10-CM

## 2023-01-13 DIAGNOSIS — K21.9 CHRONIC GERD: ICD-10-CM

## 2023-01-13 DIAGNOSIS — Z99.89 OSA ON CPAP: ICD-10-CM

## 2023-01-13 DIAGNOSIS — Z98.42: ICD-10-CM

## 2023-01-13 DIAGNOSIS — Z51.81 ENCOUNTER FOR MEDICATION MONITORING: ICD-10-CM

## 2023-01-13 DIAGNOSIS — G47.09 OTHER INSOMNIA: ICD-10-CM

## 2023-01-13 DIAGNOSIS — K74.00 FIBROSIS OF LIVER: ICD-10-CM

## 2023-01-13 DIAGNOSIS — R73.03 PREDIABETES: ICD-10-CM

## 2023-01-13 DIAGNOSIS — K76.0 FATTY LIVER: Primary | ICD-10-CM

## 2023-01-13 PROCEDURE — 99214 OFFICE O/P EST MOD 30 MIN: CPT | Performed by: FAMILY MEDICINE

## 2023-01-13 PROCEDURE — 3008F BODY MASS INDEX DOCD: CPT | Performed by: FAMILY MEDICINE

## 2023-01-13 PROCEDURE — 3074F SYST BP LT 130 MM HG: CPT | Performed by: FAMILY MEDICINE

## 2023-01-13 PROCEDURE — 3078F DIAST BP <80 MM HG: CPT | Performed by: FAMILY MEDICINE

## 2023-01-13 RX ORDER — LISINOPRIL AND HYDROCHLOROTHIAZIDE 12.5; 1 MG/1; MG/1
1 TABLET ORAL DAILY
Qty: 90 TABLET | Refills: 2 | Status: SHIPPED | OUTPATIENT
Start: 2023-01-13

## 2023-01-13 RX ORDER — MAGNESIUM 200 MG
TABLET ORAL
COMMUNITY

## 2023-01-13 RX ORDER — ZOLPIDEM TARTRATE 10 MG/1
10 TABLET ORAL NIGHTLY PRN
Qty: 30 TABLET | Refills: 1 | Status: SHIPPED | OUTPATIENT
Start: 2023-01-13 | End: 2024-01-08

## 2023-01-13 RX ORDER — OMEPRAZOLE 20 MG/1
20 CAPSULE, DELAYED RELEASE ORAL
Qty: 90 CAPSULE | Refills: 2 | Status: SHIPPED | OUTPATIENT
Start: 2023-01-13

## 2023-02-20 DIAGNOSIS — E78.2 MIXED HYPERLIPIDEMIA: ICD-10-CM

## 2023-02-20 RX ORDER — ROSUVASTATIN CALCIUM 5 MG/1
TABLET, COATED ORAL
Qty: 90 TABLET | Refills: 1 | Status: SHIPPED | OUTPATIENT
Start: 2023-02-20

## 2023-02-20 NOTE — TELEPHONE ENCOUNTER
LOV 1/13/2023      LAST LAB 9/20/22    LAST RX 11/7/22 90 tab    Next OV No future appointments.       PROTOCOL pass

## 2023-03-09 ENCOUNTER — TELEPHONE (OUTPATIENT)
Dept: FAMILY MEDICINE CLINIC | Facility: CLINIC | Age: 63
End: 2023-03-09

## 2023-03-09 NOTE — TELEPHONE ENCOUNTER
Pt's  called requesting an appointment for spouse today. Pt's both ears have been draining fluid for 7-10 days. Informed him Dr Jose Abdalla not in today would enter a high priority message to the nurse. He gave a hard time about that. He insisted on being put on the schedule with .  She is scheduled for 3-15-23. Did tell him she should not wait until than.

## 2023-03-09 NOTE — TELEPHONE ENCOUNTER
Spoke to spouse who reports:    Clear drainage from both ears  Onset 1 week     Right pain  Denies pain on left side  Uses hearing aid    Nasal congestion  Runny nose      Denies   Difficulty swallowing   Throat pain  Coughing  Chest pain  Shortness of breath    An appointment was scheduled for 3/10/23. Understanding was verbalized.

## 2023-03-10 ENCOUNTER — OFFICE VISIT (OUTPATIENT)
Dept: FAMILY MEDICINE CLINIC | Facility: CLINIC | Age: 63
End: 2023-03-10
Payer: COMMERCIAL

## 2023-03-10 VITALS
OXYGEN SATURATION: 98 % | SYSTOLIC BLOOD PRESSURE: 138 MMHG | BODY MASS INDEX: 34.55 KG/M2 | RESPIRATION RATE: 16 BRPM | WEIGHT: 195 LBS | DIASTOLIC BLOOD PRESSURE: 80 MMHG | TEMPERATURE: 98 F | HEIGHT: 63 IN | HEART RATE: 96 BPM

## 2023-03-10 DIAGNOSIS — H66.003 NON-RECURRENT ACUTE SUPPURATIVE OTITIS MEDIA OF BOTH EARS WITHOUT SPONTANEOUS RUPTURE OF TYMPANIC MEMBRANES: ICD-10-CM

## 2023-03-10 DIAGNOSIS — K21.9 GASTROESOPHAGEAL REFLUX DISEASE WITHOUT ESOPHAGITIS: ICD-10-CM

## 2023-03-10 DIAGNOSIS — J01.00 ACUTE NON-RECURRENT MAXILLARY SINUSITIS: Primary | ICD-10-CM

## 2023-03-10 DIAGNOSIS — I10 PRIMARY HYPERTENSION: ICD-10-CM

## 2023-03-10 DIAGNOSIS — H60.393 OTHER INFECTIVE ACUTE OTITIS EXTERNA OF BOTH EARS: ICD-10-CM

## 2023-03-10 PROCEDURE — 3008F BODY MASS INDEX DOCD: CPT | Performed by: FAMILY MEDICINE

## 2023-03-10 PROCEDURE — 3079F DIAST BP 80-89 MM HG: CPT | Performed by: FAMILY MEDICINE

## 2023-03-10 PROCEDURE — 3075F SYST BP GE 130 - 139MM HG: CPT | Performed by: FAMILY MEDICINE

## 2023-03-10 PROCEDURE — 99214 OFFICE O/P EST MOD 30 MIN: CPT | Performed by: FAMILY MEDICINE

## 2023-03-10 RX ORDER — AMOXICILLIN AND CLAVULANATE POTASSIUM 875; 125 MG/1; MG/1
1 TABLET, FILM COATED ORAL 2 TIMES DAILY
Qty: 20 TABLET | Refills: 0 | Status: SHIPPED | OUTPATIENT
Start: 2023-03-10 | End: 2023-03-20

## 2023-03-10 RX ORDER — NEOMYCIN SULFATE, POLYMYXIN B SULFATE AND HYDROCORTISONE 10; 3.5; 1 MG/ML; MG/ML; [USP'U]/ML
3 SUSPENSION/ DROPS AURICULAR (OTIC) 3 TIMES DAILY
Qty: 10 ML | Refills: 0 | Status: SHIPPED | OUTPATIENT
Start: 2023-03-10 | End: 2023-03-17

## 2023-03-22 ENCOUNTER — TELEPHONE (OUTPATIENT)
Dept: FAMILY MEDICINE CLINIC | Facility: CLINIC | Age: 63
End: 2023-03-22

## 2023-03-22 NOTE — TELEPHONE ENCOUNTER
Pt c/o possible infection between toes, said it is red, swollen and seems to have pus draining from it, denies fever

## 2023-03-22 NOTE — TELEPHONE ENCOUNTER
Called and spoke to patient's . He states since Tuesday patient noted redness and swelling between her toes. Does have some drainage. Unsure if she has fever, doesn't think so. States has been washing it and using \"foot powder\". Advised not to use foot powder. Soak foot in epsom salt or salt water for 5 or 10 minutes and dry thoroughly. Can apply abx ointment if she has any. States they do not have any. Advised to keep it clean and dry. Offered appt tomorrow with another provider. Would rather wait and see Dr. Vivian Barraza on Friday because patient is still having a problem with her ears. They are better but not totally healed. Patient has run out of ear drops. Appt scheduled.     Future Appointments   Date Time Provider Lilliana Afsaneh   3/24/2023  9:40 AM Rossy Olivares MD [facility]

## 2023-03-24 ENCOUNTER — OFFICE VISIT (OUTPATIENT)
Dept: FAMILY MEDICINE CLINIC | Facility: CLINIC | Age: 63
End: 2023-03-24
Payer: COMMERCIAL

## 2023-03-24 VITALS
SYSTOLIC BLOOD PRESSURE: 116 MMHG | RESPIRATION RATE: 16 BRPM | DIASTOLIC BLOOD PRESSURE: 68 MMHG | WEIGHT: 193 LBS | BODY MASS INDEX: 34.2 KG/M2 | OXYGEN SATURATION: 97 % | TEMPERATURE: 97 F | HEART RATE: 89 BPM | HEIGHT: 63 IN

## 2023-03-24 DIAGNOSIS — L03.115 CELLULITIS OF RIGHT FOOT: Primary | ICD-10-CM

## 2023-03-24 DIAGNOSIS — J30.89 NON-SEASONAL ALLERGIC RHINITIS DUE TO OTHER ALLERGIC TRIGGER: ICD-10-CM

## 2023-03-24 DIAGNOSIS — H65.493 CHRONIC MEE (MIDDLE EAR EFFUSION), BILATERAL: ICD-10-CM

## 2023-03-24 DIAGNOSIS — B36.9 FUNGAL INFECTION OF SKIN: ICD-10-CM

## 2023-03-24 DIAGNOSIS — K21.9 GASTROESOPHAGEAL REFLUX DISEASE WITHOUT ESOPHAGITIS: ICD-10-CM

## 2023-03-24 DIAGNOSIS — I10 PRIMARY HYPERTENSION: ICD-10-CM

## 2023-03-24 PROCEDURE — 99214 OFFICE O/P EST MOD 30 MIN: CPT | Performed by: FAMILY MEDICINE

## 2023-03-24 PROCEDURE — 3078F DIAST BP <80 MM HG: CPT | Performed by: FAMILY MEDICINE

## 2023-03-24 PROCEDURE — 3008F BODY MASS INDEX DOCD: CPT | Performed by: FAMILY MEDICINE

## 2023-03-24 PROCEDURE — 3074F SYST BP LT 130 MM HG: CPT | Performed by: FAMILY MEDICINE

## 2023-03-24 RX ORDER — CEFADROXIL 500 MG/1
500 CAPSULE ORAL 2 TIMES DAILY
Qty: 20 CAPSULE | Refills: 0 | Status: SHIPPED | OUTPATIENT
Start: 2023-03-24 | End: 2023-04-03

## 2023-03-24 RX ORDER — KETOCONAZOLE 20 MG/G
CREAM TOPICAL
Qty: 30 G | Refills: 0 | Status: SHIPPED | OUTPATIENT
Start: 2023-03-24

## 2023-06-12 ENCOUNTER — OFFICE VISIT (OUTPATIENT)
Dept: FAMILY MEDICINE CLINIC | Facility: CLINIC | Age: 63
End: 2023-06-12
Payer: COMMERCIAL

## 2023-06-12 VITALS
HEIGHT: 63 IN | DIASTOLIC BLOOD PRESSURE: 64 MMHG | TEMPERATURE: 97 F | RESPIRATION RATE: 16 BRPM | SYSTOLIC BLOOD PRESSURE: 130 MMHG | WEIGHT: 192 LBS | OXYGEN SATURATION: 99 % | BODY MASS INDEX: 34.02 KG/M2 | HEART RATE: 91 BPM

## 2023-06-12 DIAGNOSIS — K76.0 FATTY LIVER: ICD-10-CM

## 2023-06-12 DIAGNOSIS — B36.9 FUNGAL INFECTION OF SKIN: ICD-10-CM

## 2023-06-12 DIAGNOSIS — Z51.81 ENCOUNTER FOR MEDICATION MONITORING: ICD-10-CM

## 2023-06-12 DIAGNOSIS — M25.512 ACUTE PAIN OF LEFT SHOULDER: ICD-10-CM

## 2023-06-12 DIAGNOSIS — G47.09 OTHER INSOMNIA: ICD-10-CM

## 2023-06-12 DIAGNOSIS — G47.33 OSA ON CPAP: ICD-10-CM

## 2023-06-12 DIAGNOSIS — M05.9 SEROPOSITIVE RHEUMATOID ARTHRITIS (HCC): ICD-10-CM

## 2023-06-12 DIAGNOSIS — K21.9 CHRONIC GERD: ICD-10-CM

## 2023-06-12 DIAGNOSIS — S23.9XXA THORACIC BACK SPRAIN, INITIAL ENCOUNTER: ICD-10-CM

## 2023-06-12 DIAGNOSIS — S29.011A MUSCLE STRAIN OF CHEST WALL, INITIAL ENCOUNTER: Primary | ICD-10-CM

## 2023-06-12 DIAGNOSIS — Z23 NEED FOR SHINGLES VACCINE: ICD-10-CM

## 2023-06-12 DIAGNOSIS — I10 PRIMARY HYPERTENSION: ICD-10-CM

## 2023-06-12 DIAGNOSIS — Z12.31 VISIT FOR SCREENING MAMMOGRAM: ICD-10-CM

## 2023-06-12 DIAGNOSIS — K74.00 FIBROSIS OF LIVER: ICD-10-CM

## 2023-06-12 PROCEDURE — 90471 IMMUNIZATION ADMIN: CPT | Performed by: FAMILY MEDICINE

## 2023-06-12 PROCEDURE — 90750 HZV VACC RECOMBINANT IM: CPT | Performed by: FAMILY MEDICINE

## 2023-06-12 PROCEDURE — 3075F SYST BP GE 130 - 139MM HG: CPT | Performed by: FAMILY MEDICINE

## 2023-06-12 PROCEDURE — 3008F BODY MASS INDEX DOCD: CPT | Performed by: FAMILY MEDICINE

## 2023-06-12 PROCEDURE — 3078F DIAST BP <80 MM HG: CPT | Performed by: FAMILY MEDICINE

## 2023-06-12 PROCEDURE — 99215 OFFICE O/P EST HI 40 MIN: CPT | Performed by: FAMILY MEDICINE

## 2023-06-12 RX ORDER — ZOLPIDEM TARTRATE 10 MG/1
10 TABLET ORAL NIGHTLY PRN
Qty: 30 TABLET | Refills: 2 | Status: SHIPPED | OUTPATIENT
Start: 2023-06-12 | End: 2024-06-06

## 2023-06-12 RX ORDER — TIZANIDINE 4 MG/1
TABLET ORAL
Qty: 20 TABLET | Refills: 0 | Status: SHIPPED | OUTPATIENT
Start: 2023-06-12 | End: 2023-07-10

## 2023-06-12 NOTE — PATIENT INSTRUCTIONS
Next shingles vaccine due in 2-6 months.     Get fasting labs done as previously ordered at [facility] lab    Schedule appointment for mammogram    Schedule appointment to see liver specialist

## 2023-06-20 ENCOUNTER — HOSPITAL ENCOUNTER (OUTPATIENT)
Dept: MAMMOGRAPHY | Age: 63
Discharge: HOME | End: 2023-06-20
Attending: FAMILY MEDICINE
Payer: COMMERCIAL

## 2023-06-20 DIAGNOSIS — Z12.31 VISIT FOR SCREENING MAMMOGRAM: ICD-10-CM

## 2023-06-20 PROCEDURE — 77063 BREAST TOMOSYNTHESIS BI: CPT | Performed by: FAMILY MEDICINE

## 2023-06-20 PROCEDURE — 77067 SCR MAMMO BI INCL CAD: CPT | Performed by: FAMILY MEDICINE

## 2023-06-27 ENCOUNTER — TELEPHONE (OUTPATIENT)
Dept: FAMILY MEDICINE CLINIC | Facility: CLINIC | Age: 63
End: 2023-06-27

## 2023-06-28 ENCOUNTER — HOSPITAL ENCOUNTER (OUTPATIENT)
Age: 63
Discharge: HOME | End: 2023-06-28
Payer: COMMERCIAL

## 2023-06-28 VITALS
SYSTOLIC BLOOD PRESSURE: 131 MMHG | OXYGEN SATURATION: 97 % | RESPIRATION RATE: 18 BRPM | WEIGHT: 188 LBS | HEART RATE: 99 BPM | TEMPERATURE: 98 F | BODY MASS INDEX: 33 KG/M2 | DIASTOLIC BLOOD PRESSURE: 59 MMHG

## 2023-06-28 DIAGNOSIS — L03.039 CELLULITIS OF FIFTH TOE: Primary | ICD-10-CM

## 2023-06-28 PROCEDURE — 99213 OFFICE O/P EST LOW 20 MIN: CPT

## 2023-06-28 PROCEDURE — 99204 OFFICE O/P NEW MOD 45 MIN: CPT

## 2023-06-28 RX ORDER — CEPHALEXIN 500 MG/1
500 CAPSULE ORAL 4 TIMES DAILY
Qty: 40 CAPSULE | Refills: 0 | Status: SHIPPED | OUTPATIENT
Start: 2023-06-28 | End: 2023-07-08

## 2023-06-28 NOTE — DISCHARGE INSTRUCTIONS
Rest and use warm water epsom salt soaks for 15-20 minutes at a time a few times a day. Do this for 3-5 days. Start the antibiotics and make sure to finish the entire prescription. Take a probiotic or eat a yogurt everyday to help with the GI effects. Follow up with Dr. Cristian White in 5-7 days.

## 2023-06-30 ENCOUNTER — TELEPHONE (OUTPATIENT)
Dept: PODIATRY CLINIC | Facility: CLINIC | Age: 63
End: 2023-06-30

## 2023-07-10 ENCOUNTER — OFFICE VISIT (OUTPATIENT)
Dept: FAMILY MEDICINE CLINIC | Facility: CLINIC | Age: 63
End: 2023-07-10
Payer: COMMERCIAL

## 2023-07-10 VITALS
OXYGEN SATURATION: 98 % | HEART RATE: 88 BPM | SYSTOLIC BLOOD PRESSURE: 110 MMHG | BODY MASS INDEX: 34.91 KG/M2 | RESPIRATION RATE: 16 BRPM | DIASTOLIC BLOOD PRESSURE: 66 MMHG | TEMPERATURE: 98 F | WEIGHT: 197 LBS | HEIGHT: 63 IN

## 2023-07-10 DIAGNOSIS — R73.03 PREDIABETES: ICD-10-CM

## 2023-07-10 DIAGNOSIS — L03.031 CELLULITIS OF TOE OF RIGHT FOOT: ICD-10-CM

## 2023-07-10 DIAGNOSIS — H90.3 SENSORINEURAL HEARING LOSS, BILATERAL: ICD-10-CM

## 2023-07-10 DIAGNOSIS — K74.00 FIBROSIS OF LIVER: ICD-10-CM

## 2023-07-10 DIAGNOSIS — E78.2 MIXED HYPERLIPIDEMIA: ICD-10-CM

## 2023-07-10 DIAGNOSIS — I10 PRIMARY HYPERTENSION: ICD-10-CM

## 2023-07-10 DIAGNOSIS — R92.2 INCONCLUSIVE MAMMOGRAM: ICD-10-CM

## 2023-07-10 DIAGNOSIS — K76.0 FATTY LIVER: ICD-10-CM

## 2023-07-10 DIAGNOSIS — Z80.3 FAMILY HISTORY OF BREAST CANCER: ICD-10-CM

## 2023-07-10 DIAGNOSIS — K21.9 GASTROESOPHAGEAL REFLUX DISEASE WITHOUT ESOPHAGITIS: ICD-10-CM

## 2023-07-10 DIAGNOSIS — Z01.419 WELL WOMAN EXAM WITH ROUTINE GYNECOLOGICAL EXAM: Primary | ICD-10-CM

## 2023-07-10 RX ORDER — CEPHALEXIN 500 MG/1
500 CAPSULE ORAL 4 TIMES DAILY
COMMUNITY
End: 2023-07-10

## 2023-07-10 NOTE — PATIENT INSTRUCTIONS
Get fasting labs done at THE [facility] lab as previously ordered    Keep appointment with gastroenterologist for 8/21/2023 as scheduled    Schedule appointment for breast ultrasound

## 2023-07-11 ENCOUNTER — LAB ENCOUNTER (OUTPATIENT)
Dept: LAB | Age: 63
End: 2023-07-11
Attending: FAMILY MEDICINE
Payer: COMMERCIAL

## 2023-07-11 DIAGNOSIS — E78.2 MIXED HYPERLIPIDEMIA: ICD-10-CM

## 2023-07-11 DIAGNOSIS — Z51.81 ENCOUNTER FOR MEDICATION MONITORING: ICD-10-CM

## 2023-07-11 DIAGNOSIS — K74.00 FIBROSIS OF LIVER: ICD-10-CM

## 2023-07-11 DIAGNOSIS — R73.03 PREDIABETES: ICD-10-CM

## 2023-07-11 DIAGNOSIS — K21.9 CHRONIC GERD: ICD-10-CM

## 2023-07-11 DIAGNOSIS — M05.9 SEROPOSITIVE RHEUMATOID ARTHRITIS (HCC): ICD-10-CM

## 2023-07-11 DIAGNOSIS — K76.0 FATTY LIVER: ICD-10-CM

## 2023-07-11 DIAGNOSIS — I10 ESSENTIAL HYPERTENSION: ICD-10-CM

## 2023-07-11 LAB
ALBUMIN SERPL-MCNC: 3.3 G/DL (ref 3.4–5)
ALBUMIN/GLOB SERPL: 0.9 {RATIO} (ref 1–2)
ALP LIVER SERPL-CCNC: 77 U/L
ALT SERPL-CCNC: 26 U/L
ANION GAP SERPL CALC-SCNC: 3 MMOL/L (ref 0–18)
AST SERPL-CCNC: 22 U/L (ref 15–37)
BASOPHILS # BLD AUTO: 0.06 X10(3) UL (ref 0–0.2)
BASOPHILS NFR BLD AUTO: 0.7 %
BILIRUB SERPL-MCNC: 0.6 MG/DL (ref 0.1–2)
BUN BLD-MCNC: 16 MG/DL (ref 7–18)
CALCIUM BLD-MCNC: 9.5 MG/DL (ref 8.5–10.1)
CHLORIDE SERPL-SCNC: 110 MMOL/L (ref 98–112)
CHOLEST SERPL-MCNC: 246 MG/DL (ref ?–200)
CO2 SERPL-SCNC: 30 MMOL/L (ref 21–32)
CREAT BLD-MCNC: 0.84 MG/DL
CRP SERPL-MCNC: 1.66 MG/DL (ref ?–0.3)
EOSINOPHIL # BLD AUTO: 0.37 X10(3) UL (ref 0–0.7)
EOSINOPHIL NFR BLD AUTO: 4.5 %
ERYTHROCYTE [DISTWIDTH] IN BLOOD BY AUTOMATED COUNT: 15 %
ERYTHROCYTE [SEDIMENTATION RATE] IN BLOOD: 44 MM/HR
EST. AVERAGE GLUCOSE BLD GHB EST-MCNC: 143 MG/DL (ref 68–126)
FASTING PATIENT LIPID ANSWER: YES
FASTING STATUS PATIENT QL REPORTED: YES
GFR SERPLBLD BASED ON 1.73 SQ M-ARVRAT: 79 ML/MIN/1.73M2 (ref 60–?)
GLOBULIN PLAS-MCNC: 3.6 G/DL (ref 2.8–4.4)
GLUCOSE BLD-MCNC: 132 MG/DL (ref 70–99)
HBA1C MFR BLD: 6.6 % (ref ?–5.7)
HCT VFR BLD AUTO: 33.9 %
HDLC SERPL-MCNC: 84 MG/DL (ref 40–59)
HGB BLD-MCNC: 10.7 G/DL
IMM GRANULOCYTES # BLD AUTO: 0.03 X10(3) UL (ref 0–1)
IMM GRANULOCYTES NFR BLD: 0.4 %
LDLC SERPL CALC-MCNC: 141 MG/DL (ref ?–100)
LYMPHOCYTES # BLD AUTO: 3.37 X10(3) UL (ref 1–4)
LYMPHOCYTES NFR BLD AUTO: 40.9 %
MAGNESIUM SERPL-MCNC: 2 MG/DL (ref 1.6–2.6)
MCH RBC QN AUTO: 26.4 PG (ref 26–34)
MCHC RBC AUTO-ENTMCNC: 31.6 G/DL (ref 31–37)
MCV RBC AUTO: 83.5 FL
MONOCYTES # BLD AUTO: 0.64 X10(3) UL (ref 0.1–1)
MONOCYTES NFR BLD AUTO: 7.8 %
NEUTROPHILS # BLD AUTO: 3.77 X10 (3) UL (ref 1.5–7.7)
NEUTROPHILS # BLD AUTO: 3.77 X10(3) UL (ref 1.5–7.7)
NEUTROPHILS NFR BLD AUTO: 45.7 %
NONHDLC SERPL-MCNC: 162 MG/DL (ref ?–130)
OSMOLALITY SERPL CALC.SUM OF ELEC: 299 MOSM/KG (ref 275–295)
PLATELET # BLD AUTO: 290 10(3)UL (ref 150–450)
POTASSIUM SERPL-SCNC: 4 MMOL/L (ref 3.5–5.1)
PROT SERPL-MCNC: 6.9 G/DL (ref 6.4–8.2)
RBC # BLD AUTO: 4.06 X10(6)UL
SODIUM SERPL-SCNC: 143 MMOL/L (ref 136–145)
TRIGL SERPL-MCNC: 120 MG/DL (ref 30–149)
VLDLC SERPL CALC-MCNC: 22 MG/DL (ref 0–30)
WBC # BLD AUTO: 8.2 X10(3) UL (ref 4–11)

## 2023-07-11 PROCEDURE — 80053 COMPREHEN METABOLIC PANEL: CPT

## 2023-07-11 PROCEDURE — 83036 HEMOGLOBIN GLYCOSYLATED A1C: CPT

## 2023-07-11 PROCEDURE — 85025 COMPLETE CBC W/AUTO DIFF WBC: CPT

## 2023-07-11 PROCEDURE — 86140 C-REACTIVE PROTEIN: CPT

## 2023-07-11 PROCEDURE — 83735 ASSAY OF MAGNESIUM: CPT

## 2023-07-11 PROCEDURE — 80061 LIPID PANEL: CPT

## 2023-07-11 PROCEDURE — 85652 RBC SED RATE AUTOMATED: CPT

## 2023-07-11 PROCEDURE — 36415 COLL VENOUS BLD VENIPUNCTURE: CPT

## 2023-07-20 LAB — HPV I/H RISK 1 DNA SPEC QL NAA+PROBE: NEGATIVE

## 2023-07-21 ENCOUNTER — HOSPITAL ENCOUNTER (OUTPATIENT)
Dept: ULTRASOUND IMAGING | Age: 63
Discharge: HOME | End: 2023-07-21
Attending: FAMILY MEDICINE
Payer: COMMERCIAL

## 2023-07-21 DIAGNOSIS — R92.2 INCONCLUSIVE MAMMOGRAM: ICD-10-CM

## 2023-07-21 PROCEDURE — 76642 ULTRASOUND BREAST LIMITED: CPT | Performed by: FAMILY MEDICINE

## 2023-07-25 ENCOUNTER — TELEPHONE (OUTPATIENT)
Dept: FAMILY MEDICINE CLINIC | Facility: CLINIC | Age: 63
End: 2023-07-25

## 2023-07-25 NOTE — TELEPHONE ENCOUNTER
LOV 7/10/23    Last refill  omeprazole 20 MG Oral Capsule Delayed Release (Discontinued) 90 capsule 2 1/13/2023 3/10/2023    Sig - Route: Take 1 capsule (20 mg total) by mouth every morning before breakfast. - Oral    Sent to pharmacy as: Omeprazole 20 MG Oral Capsule Delayed Release (PriLOSEC)    Reason for Discontinue: Duplicate therapy    E-Prescribing Status: Receipt confirmed by pharmacy (1/13/2023  2:27 PM CST)      F/u scheduled:  Future Appointments   Date Time Provider Lilliana Shelby   8/2/2023 12:40 PM Tanna Sahu MD [facility]       Kourteny Coleman and spoke with pharmacist and confirmed 2 refills available on file. Pharmacy will process and notify pt when ready for .

## 2023-07-25 NOTE — TELEPHONE ENCOUNTER
Pt's spouse called asking for Dr to fill the \"Omeprazole\". Has not been filled for awhile. Having a problem with acid reflux. Pt's next diamond is 8-2-23.

## 2023-08-02 ENCOUNTER — OFFICE VISIT (OUTPATIENT)
Dept: FAMILY MEDICINE CLINIC | Facility: CLINIC | Age: 63
End: 2023-08-02
Payer: COMMERCIAL

## 2023-08-02 VITALS
HEART RATE: 85 BPM | HEIGHT: 63 IN | SYSTOLIC BLOOD PRESSURE: 136 MMHG | TEMPERATURE: 98 F | OXYGEN SATURATION: 98 % | BODY MASS INDEX: 33.66 KG/M2 | RESPIRATION RATE: 16 BRPM | DIASTOLIC BLOOD PRESSURE: 74 MMHG | WEIGHT: 190 LBS

## 2023-08-02 DIAGNOSIS — N60.02 BREAST CYST, LEFT: ICD-10-CM

## 2023-08-02 DIAGNOSIS — M05.9 SEROPOSITIVE RHEUMATOID ARTHRITIS (HCC): ICD-10-CM

## 2023-08-02 DIAGNOSIS — K74.00 FIBROSIS OF LIVER: ICD-10-CM

## 2023-08-02 DIAGNOSIS — H35.3132 INTERMEDIATE STAGE NONEXUDATIVE AGE-RELATED MACULAR DEGENERATION OF BOTH EYES: ICD-10-CM

## 2023-08-02 DIAGNOSIS — E78.2 MIXED HYPERLIPIDEMIA: ICD-10-CM

## 2023-08-02 DIAGNOSIS — D50.8 OTHER IRON DEFICIENCY ANEMIA: ICD-10-CM

## 2023-08-02 DIAGNOSIS — R70.0 ELEVATED SED RATE: ICD-10-CM

## 2023-08-02 DIAGNOSIS — E88.09 HYPOALBUMINEMIA: ICD-10-CM

## 2023-08-02 DIAGNOSIS — R79.82 ELEVATED C-REACTIVE PROTEIN (CRP): ICD-10-CM

## 2023-08-02 DIAGNOSIS — H81.13 BENIGN PAROXYSMAL POSITIONAL VERTIGO DUE TO BILATERAL VESTIBULAR DISORDER: ICD-10-CM

## 2023-08-02 DIAGNOSIS — Z12.31 VISIT FOR SCREENING MAMMOGRAM: ICD-10-CM

## 2023-08-02 DIAGNOSIS — K21.9 GASTROESOPHAGEAL REFLUX DISEASE WITHOUT ESOPHAGITIS: ICD-10-CM

## 2023-08-02 DIAGNOSIS — I10 PRIMARY HYPERTENSION: ICD-10-CM

## 2023-08-02 DIAGNOSIS — Z51.81 ENCOUNTER FOR MEDICATION MONITORING: ICD-10-CM

## 2023-08-02 DIAGNOSIS — E11.9 CONTROLLED TYPE 2 DIABETES MELLITUS WITHOUT COMPLICATION, WITHOUT LONG-TERM CURRENT USE OF INSULIN (HCC): Primary | ICD-10-CM

## 2023-08-02 PROCEDURE — 99215 OFFICE O/P EST HI 40 MIN: CPT | Performed by: FAMILY MEDICINE

## 2023-08-02 PROCEDURE — 3075F SYST BP GE 130 - 139MM HG: CPT | Performed by: FAMILY MEDICINE

## 2023-08-02 PROCEDURE — 3008F BODY MASS INDEX DOCD: CPT | Performed by: FAMILY MEDICINE

## 2023-08-02 PROCEDURE — 3072F LOW RISK FOR RETINOPATHY: CPT | Performed by: FAMILY MEDICINE

## 2023-08-02 PROCEDURE — 3078F DIAST BP <80 MM HG: CPT | Performed by: FAMILY MEDICINE

## 2023-08-02 RX ORDER — MECLIZINE HCL 12.5 MG/1
TABLET ORAL
Qty: 30 TABLET | Refills: 0 | Status: SHIPPED | OUTPATIENT
Start: 2023-08-02

## 2023-08-10 DIAGNOSIS — E78.2 MIXED HYPERLIPIDEMIA: ICD-10-CM

## 2023-08-10 RX ORDER — ROSUVASTATIN CALCIUM 5 MG/1
TABLET, COATED ORAL
Qty: 90 TABLET | Refills: 1 | Status: SHIPPED | OUTPATIENT
Start: 2023-08-10

## 2023-08-10 NOTE — TELEPHONE ENCOUNTER
LOV 8/2/2023      LAST LAB 7/11/23    LAST RX 2/20/2023 90 tab 1 refill    Next OV   Future Appointments   Date Time Provider Lilliana Shelby   8/21/2023 10:20 AM Miranda Shoemaker DO [facility]         PROTOCOL pass

## 2023-08-22 ENCOUNTER — TELEPHONE (OUTPATIENT)
Dept: FAMILY MEDICINE CLINIC | Facility: CLINIC | Age: 63
End: 2023-08-22

## 2023-08-22 NOTE — TELEPHONE ENCOUNTER
Patient is requesting that a referral will be sent to [facility]. Patient has an upcoming appointment for EGD, Colonoscopy.  Patient would like a chase back to further discuss

## 2023-08-23 ENCOUNTER — TELEPHONE (OUTPATIENT)
Dept: FAMILY MEDICINE CLINIC | Facility: CLINIC | Age: 63
End: 2023-08-23

## 2023-08-23 DIAGNOSIS — D50.9 IRON DEFICIENCY ANEMIA, UNSPECIFIED IRON DEFICIENCY ANEMIA TYPE: Primary | ICD-10-CM

## 2023-08-23 DIAGNOSIS — K74.00 FIBROSIS OF LIVER: ICD-10-CM

## 2023-08-23 NOTE — TELEPHONE ENCOUNTER
Specialty Provider's Name? [facility]    Specialty? Gastro     Specialty Provider's Contact Info? Lee Ann Lazar [phone]    Reason for Order / Referral?    Has the patient been seen by their PCP for this condition? yes    If the patient says NO,and the patient has HMO, please let the patient know that they must be seen by their PCP first to evaluate the need to see a specialist.  Sometimes the PCP can actually treat the condition. If the patient just needs more visits, the referral request is fine as long as the patient has been seen in the past 12 months. Is Appt. Already Scheduled?: yes        If so, Date?: 10/2/23    Please advise the patient it takes 7-10 business days for the referral to be reviewed and approved. For specialized referrals such as durable medical equipment, referrals may take longer to process.

## 2023-08-24 ENCOUNTER — LAB ENCOUNTER (OUTPATIENT)
Dept: LAB | Age: 63
End: 2023-08-24
Attending: INTERNAL MEDICINE
Payer: COMMERCIAL

## 2023-08-24 DIAGNOSIS — K74.00 LIVER FIBROSIS: ICD-10-CM

## 2023-08-24 DIAGNOSIS — D50.9 IRON DEFICIENCY ANEMIA, UNSPECIFIED IRON DEFICIENCY ANEMIA TYPE: ICD-10-CM

## 2023-08-24 LAB
AFP-TM SERPL-MCNC: 5.3 NG/ML (ref ?–8)
DEPRECATED HBV CORE AB SER IA-ACNC: 33.6 NG/ML
HAV AB SER QL IA: REACTIVE
HAV IGM SER QL: NONREACTIVE
HBV SURFACE AB SER QL: NONREACTIVE
HBV SURFACE AB SERPL IA-ACNC: <3.1 MIU/ML
HBV SURFACE AG SER-ACNC: <0.1 [IU]/L
HBV SURFACE AG SERPL QL IA: NONREACTIVE
HCV AB SERPL QL IA: NONREACTIVE
IGA SERPL-MCNC: 112 MG/DL (ref 70–312)
IGM SERPL-MCNC: 94.8 MG/DL (ref 43–279)
IMMUNOGLOBULIN PNL SER-MCNC: 1080 MG/DL (ref 791–1643)
INR BLD: 0.88 (ref 0.85–1.16)
IRON SATN MFR SERPL: 11 %
IRON SERPL-MCNC: 58 UG/DL
PROTHROMBIN TIME: 12 SECONDS (ref 11.6–14.8)
TIBC SERPL-MCNC: 524 UG/DL (ref 240–450)
TRANSFERRIN SERPL-MCNC: 352 MG/DL (ref 200–360)

## 2023-08-24 PROCEDURE — 82105 ALPHA-FETOPROTEIN SERUM: CPT

## 2023-08-24 PROCEDURE — 82104 ALPHA-1-ANTITRYPSIN PHENO: CPT

## 2023-08-24 PROCEDURE — 86364 TISS TRNSGLTMNASE EA IG CLAS: CPT

## 2023-08-24 PROCEDURE — 83516 IMMUNOASSAY NONANTIBODY: CPT

## 2023-08-24 PROCEDURE — 86709 HEPATITIS A IGM ANTIBODY: CPT

## 2023-08-24 PROCEDURE — 86803 HEPATITIS C AB TEST: CPT

## 2023-08-24 PROCEDURE — 82103 ALPHA-1-ANTITRYPSIN TOTAL: CPT

## 2023-08-24 PROCEDURE — 36415 COLL VENOUS BLD VENIPUNCTURE: CPT

## 2023-08-24 PROCEDURE — 85610 PROTHROMBIN TIME: CPT

## 2023-08-24 PROCEDURE — 83550 IRON BINDING TEST: CPT

## 2023-08-24 PROCEDURE — 83540 ASSAY OF IRON: CPT

## 2023-08-24 PROCEDURE — 86708 HEPATITIS A ANTIBODY: CPT

## 2023-08-24 PROCEDURE — 86706 HEP B SURFACE ANTIBODY: CPT

## 2023-08-24 PROCEDURE — 87340 HEPATITIS B SURFACE AG IA: CPT

## 2023-08-24 PROCEDURE — 82784 ASSAY IGA/IGD/IGG/IGM EACH: CPT

## 2023-08-24 PROCEDURE — 82728 ASSAY OF FERRITIN: CPT

## 2023-08-24 NOTE — TELEPHONE ENCOUNTER
[facility] advising patient the referral to [address] [place] [address] for ongoing care has been placed. Called patient's  and advised referrals are in chart for visit to [facility] [id number] and authorization for colonoscopy and EGD.

## 2023-08-24 NOTE — TELEPHONE ENCOUNTER
LOV 8/2/23    Future Appointments   Date Time Provider Lilliana Afsaneh   9/14/2023  9:00 AM SHANIQUA MR RM1 (1.5T) [facility] [address]   10/2/2023  3:30 [place] [address] [place] [address] [facility] GI   10/2/2023  3:45 [place] [address] [place] [address] SUB GI   11/2/2023  9:00 AM Daniela Barnett APRN [facility]     Ongoing care. GI referral placed as requested. Pt notified.

## 2023-08-25 LAB
ACTIN SMOOTH MUSCLE AB: 6 UNITS
M2 MITOCHONDRIAL AB: <20 UNITS

## 2023-08-28 LAB — TTG IGA SER-ACNC: 0.3 U/ML (ref ?–7)

## 2023-08-30 DIAGNOSIS — R73.03 PREDIABETES: ICD-10-CM

## 2023-08-30 LAB — A-1-ANTITRYPSIN: 145 MG/DL

## 2023-08-30 RX ORDER — METFORMIN HYDROCHLORIDE 500 MG/1
500 TABLET, EXTENDED RELEASE ORAL 2 TIMES DAILY WITH MEALS
Qty: 180 TABLET | Refills: 0 | Status: SHIPPED | OUTPATIENT
Start: 2023-08-30

## 2023-08-31 ENCOUNTER — TELEPHONE (OUTPATIENT)
Dept: FAMILY MEDICINE CLINIC | Facility: CLINIC | Age: 63
End: 2023-08-31

## 2023-08-31 DIAGNOSIS — Z23 NEED FOR VACCINATION: Primary | ICD-10-CM

## 2023-08-31 NOTE — TELEPHONE ENCOUNTER
Patient's  called and stated patient noted dk red blood from rectum today prior to having a BM. He does not know if patient has any history of hemorrhoids. Denies clots. States patient denies dizziness at this time but sometimes does feel dizzy. Had some pain in the right side prior to having BM. Denies any SOB/CP, N/V. States has EGD and colonoscopy scheduled next month. Advised he should notify GI office of current symptoms. States he is unsure what to tell them and is asking if we can call them. He is also asking if he can schedule patient for vaccines. She is due for second shingles vaccine and was told by GI she needs a Hepatitis vaccine. [facility] for QUINN SANTOS in Dr. Castañeda Median office with update on bleeding episode today. Advised to call patient or return call to our triage number with any questions. Advised patient's  was instructed if patient has more bleeding, dizziness, CP, SOB he should take her to the ED. Per lab result note:  Emmanuel Basurto, DO  8/25/2023  7:26 AM CDT Back to Top      Your do not have immunity to Hepatitis B and I recommend vaccination which you can do with your primary care physician. Your iron saturation is low and I recommend proceeding with the EGD and colonoscopy. Please hold Iron 10 days prior to the procedures. Your other labs thus far are unremarkable or are in process. You do have immunity to Hepatitis A but not an active infection.      Sincerely,  Dr. Shelley Parson ([facility]) 6/12/2023     Nurse visit scheduled for Shingles #2 and Hepatitis @1  Future Appointments   Date Time Provider Lilliana Shelby   9/8/2023 10:00 AM [facility] N[facility]SE [facility]

## 2023-09-07 NOTE — TELEPHONE ENCOUNTER
Agree with nursing note  Had EGD and C-Scope scheduled  Orders placed for vaccines  Will need Hep B #2 vaccine 2 months after 1st dose

## 2023-09-14 ENCOUNTER — NURSE ONLY (OUTPATIENT)
Dept: FAMILY MEDICINE CLINIC | Facility: CLINIC | Age: 63
End: 2023-09-14
Payer: COMMERCIAL

## 2023-09-14 ENCOUNTER — HOSPITAL ENCOUNTER (OUTPATIENT)
Dept: MRI IMAGING | Age: 63
Discharge: HOME | End: 2023-09-14
Attending: INTERNAL MEDICINE
Payer: COMMERCIAL

## 2023-09-14 DIAGNOSIS — K74.00 LIVER FIBROSIS: ICD-10-CM

## 2023-09-14 PROCEDURE — 90471 IMMUNIZATION ADMIN: CPT | Performed by: FAMILY MEDICINE

## 2023-09-14 PROCEDURE — 90472 IMMUNIZATION ADMIN EACH ADD: CPT | Performed by: FAMILY MEDICINE

## 2023-09-14 PROCEDURE — 90746 HEPB VACCINE 3 DOSE ADULT IM: CPT | Performed by: FAMILY MEDICINE

## 2023-09-14 PROCEDURE — 90750 HZV VACC RECOMBINANT IM: CPT | Performed by: FAMILY MEDICINE

## 2023-09-29 RX ORDER — SODIUM PICOSULFATE, MAGNESIUM OXIDE, AND ANHYDROUS CITRIC ACID 12; 3.5; 1 G/175ML; G/175ML; MG/175ML
LIQUID ORAL
COMMUNITY
Start: 2023-08-22

## 2023-09-29 RX ORDER — CIPROFLOXACIN AND DEXAMETHASONE 3; 1 MG/ML; MG/ML
SUSPENSION/ DROPS AURICULAR (OTIC)
COMMUNITY
Start: 2023-09-14

## 2023-10-02 PROBLEM — Z86.0101 HISTORY OF ADENOMATOUS POLYP OF COLON: Status: ACTIVE | Noted: 2023-10-02

## 2023-10-02 PROBLEM — R10.13 ABDOMINAL PAIN, EPIGASTRIC: Status: ACTIVE | Noted: 2023-10-02

## 2023-10-02 PROBLEM — Z86.010 HISTORY OF ADENOMATOUS POLYP OF COLON: Status: ACTIVE | Noted: 2023-10-02

## 2023-10-02 PROBLEM — D50.9 IRON DEFICIENCY ANEMIA, UNSPECIFIED: Status: ACTIVE | Noted: 2023-10-02

## 2023-10-03 PROCEDURE — 88305 TISSUE EXAM BY PATHOLOGIST: CPT | Performed by: INTERNAL MEDICINE

## 2023-10-10 ENCOUNTER — HOSPITAL ENCOUNTER (OUTPATIENT)
Dept: MRI IMAGING | Age: 63
Discharge: HOME | End: 2023-10-10
Attending: INTERNAL MEDICINE
Payer: COMMERCIAL

## 2023-10-10 PROCEDURE — A9581 GADOXETATE DISODIUM INJ: HCPCS

## 2023-10-10 PROCEDURE — 74183 MRI ABD W/O CNTR FLWD CNTR: CPT | Performed by: INTERNAL MEDICINE

## 2023-10-25 ENCOUNTER — OFFICE VISIT (OUTPATIENT)
Dept: FAMILY MEDICINE CLINIC | Facility: CLINIC | Age: 63
End: 2023-10-25
Payer: COMMERCIAL

## 2023-10-25 VITALS
TEMPERATURE: 97 F | BODY MASS INDEX: 33.66 KG/M2 | HEART RATE: 88 BPM | DIASTOLIC BLOOD PRESSURE: 64 MMHG | RESPIRATION RATE: 18 BRPM | HEIGHT: 63 IN | OXYGEN SATURATION: 98 % | SYSTOLIC BLOOD PRESSURE: 118 MMHG | WEIGHT: 190 LBS

## 2023-10-25 DIAGNOSIS — D64.9 NORMOCYTIC ANEMIA: ICD-10-CM

## 2023-10-25 DIAGNOSIS — E55.9 VITAMIN D DEFICIENCY: ICD-10-CM

## 2023-10-25 DIAGNOSIS — M54.16 LUMBAR RADICULOPATHY: ICD-10-CM

## 2023-10-25 DIAGNOSIS — K76.0 FATTY LIVER: ICD-10-CM

## 2023-10-25 DIAGNOSIS — K21.9 GASTROESOPHAGEAL REFLUX DISEASE WITHOUT ESOPHAGITIS: Primary | ICD-10-CM

## 2023-10-25 DIAGNOSIS — E11.9 CONTROLLED TYPE 2 DIABETES MELLITUS WITHOUT COMPLICATION, WITHOUT LONG-TERM CURRENT USE OF INSULIN (HCC): ICD-10-CM

## 2023-10-25 DIAGNOSIS — I10 PRIMARY HYPERTENSION: ICD-10-CM

## 2023-10-25 DIAGNOSIS — D17.79 MYELOLIPOMA OF LEFT ADRENAL GLAND: ICD-10-CM

## 2023-10-25 DIAGNOSIS — E66.9 CLASS 1 OBESITY WITH SERIOUS COMORBIDITY AND BODY MASS INDEX (BMI) OF 33.0 TO 33.9 IN ADULT, UNSPECIFIED OBESITY TYPE: ICD-10-CM

## 2023-10-25 DIAGNOSIS — Z23 NEED FOR VACCINATION: ICD-10-CM

## 2023-10-25 DIAGNOSIS — M79.671 RIGHT FOOT PAIN: ICD-10-CM

## 2023-10-25 DIAGNOSIS — E78.2 MIXED HYPERLIPIDEMIA: ICD-10-CM

## 2023-10-25 DIAGNOSIS — D50.8 OTHER IRON DEFICIENCY ANEMIA: ICD-10-CM

## 2023-10-25 PROCEDURE — 99214 OFFICE O/P EST MOD 30 MIN: CPT | Performed by: FAMILY MEDICINE

## 2023-10-25 PROCEDURE — 3008F BODY MASS INDEX DOCD: CPT | Performed by: FAMILY MEDICINE

## 2023-10-25 PROCEDURE — 3078F DIAST BP <80 MM HG: CPT | Performed by: FAMILY MEDICINE

## 2023-10-25 PROCEDURE — 90686 IIV4 VACC NO PRSV 0.5 ML IM: CPT | Performed by: FAMILY MEDICINE

## 2023-10-25 PROCEDURE — 90471 IMMUNIZATION ADMIN: CPT | Performed by: FAMILY MEDICINE

## 2023-10-25 PROCEDURE — 3074F SYST BP LT 130 MM HG: CPT | Performed by: FAMILY MEDICINE

## 2023-10-25 RX ORDER — FERROUS SULFATE 325(65) MG
325 TABLET ORAL
Qty: 90 TABLET | Refills: 1 | Status: SHIPPED | OUTPATIENT
Start: 2023-10-25

## 2023-10-25 RX ORDER — PREDNISONE 20 MG/1
20 TABLET ORAL DAILY
Qty: 5 TABLET | Refills: 0 | Status: SHIPPED | OUTPATIENT
Start: 2023-10-25 | End: 2023-10-30

## 2023-10-25 RX ORDER — OMEPRAZOLE 20 MG/1
20 CAPSULE, DELAYED RELEASE ORAL
Qty: 90 CAPSULE | Refills: 2 | Status: SHIPPED | OUTPATIENT
Start: 2023-10-25

## 2023-10-26 ENCOUNTER — TELEPHONE (OUTPATIENT)
Dept: FAMILY MEDICINE CLINIC | Facility: CLINIC | Age: 63
End: 2023-10-26

## 2023-10-26 NOTE — TELEPHONE ENCOUNTER
Pt's  called asking to sp w/  or nurse, he is upset because he has been calling Gastro office [address] [place] [address] and trying to schedule an appointment for an endoscopy for a month, said pt had a colonoscopy on 10/2/23 and was told she needs an endoscopy, he was given a direct # to the  Barney Willingham #[phone] ext [phone], said he has left several msg's and not received a call back

## 2023-10-26 NOTE — TELEPHONE ENCOUNTER
Per Kamlesh Gorman note post EGD:  Recommend capsule endoscopy to complete work up for [employer] Dr. Claudette Chaudhry office and advised of patient's 's concern with not receiving a return call. They will give the message to the MA who schedules the capsule endoscopy and have her return call to patient. Called and informed patient's  to expect a call from the MA in Dr. Claudette Chaudhry office to schedule the procedure.

## 2023-11-09 ENCOUNTER — HOSPITAL ENCOUNTER (OUTPATIENT)
Dept: GENERAL RADIOLOGY | Age: 63
Discharge: HOME | End: 2023-11-09
Attending: FAMILY MEDICINE
Payer: COMMERCIAL

## 2023-11-09 DIAGNOSIS — M79.671 RIGHT FOOT PAIN: ICD-10-CM

## 2023-11-09 PROCEDURE — 73630 X-RAY EXAM OF FOOT: CPT | Performed by: FAMILY MEDICINE

## 2023-11-13 ENCOUNTER — LAB ENCOUNTER (OUTPATIENT)
Dept: LAB | Age: 63
End: 2023-11-13
Attending: FAMILY MEDICINE
Payer: COMMERCIAL

## 2023-11-13 DIAGNOSIS — E11.9 CONTROLLED TYPE 2 DIABETES MELLITUS WITHOUT COMPLICATION, WITHOUT LONG-TERM CURRENT USE OF INSULIN (HCC): ICD-10-CM

## 2023-11-13 DIAGNOSIS — Z51.81 ENCOUNTER FOR MEDICATION MONITORING: ICD-10-CM

## 2023-11-13 DIAGNOSIS — E55.9 VITAMIN D DEFICIENCY: ICD-10-CM

## 2023-11-13 DIAGNOSIS — K74.00 FIBROSIS OF LIVER: ICD-10-CM

## 2023-11-13 DIAGNOSIS — E88.09 HYPOALBUMINEMIA: ICD-10-CM

## 2023-11-13 DIAGNOSIS — I10 PRIMARY HYPERTENSION: ICD-10-CM

## 2023-11-13 DIAGNOSIS — R70.0 ELEVATED SED RATE: ICD-10-CM

## 2023-11-13 DIAGNOSIS — R79.82 ELEVATED C-REACTIVE PROTEIN (CRP): ICD-10-CM

## 2023-11-13 DIAGNOSIS — E78.2 MIXED HYPERLIPIDEMIA: ICD-10-CM

## 2023-11-13 DIAGNOSIS — M05.9 SEROPOSITIVE RHEUMATOID ARTHRITIS (HCC): ICD-10-CM

## 2023-11-13 LAB
ALBUMIN SERPL-MCNC: 3.5 G/DL (ref 3.4–5)
ALBUMIN/GLOB SERPL: 0.9 {RATIO} (ref 1–2)
ALP LIVER SERPL-CCNC: 83 U/L
ALT SERPL-CCNC: 26 U/L
ANION GAP SERPL CALC-SCNC: 7 MMOL/L (ref 0–18)
AST SERPL-CCNC: 16 U/L (ref 15–37)
BASOPHILS # BLD AUTO: 0.08 X10(3) UL (ref 0–0.2)
BASOPHILS NFR BLD AUTO: 0.8 %
BILIRUB SERPL-MCNC: 0.6 MG/DL (ref 0.1–2)
BUN BLD-MCNC: 19 MG/DL (ref 9–23)
CALCIUM BLD-MCNC: 9.8 MG/DL (ref 8.5–10.1)
CHLORIDE SERPL-SCNC: 105 MMOL/L (ref 98–112)
CHOLEST SERPL-MCNC: 172 MG/DL (ref ?–200)
CO2 SERPL-SCNC: 30 MMOL/L (ref 21–32)
CREAT BLD-MCNC: 1.02 MG/DL
CREAT UR-SCNC: 246 MG/DL
EGFRCR SERPLBLD CKD-EPI 2021: 63 ML/MIN/1.73M2 (ref 60–?)
EOSINOPHIL # BLD AUTO: 0.4 X10(3) UL (ref 0–0.7)
EOSINOPHIL NFR BLD AUTO: 3.8 %
ERYTHROCYTE [DISTWIDTH] IN BLOOD BY AUTOMATED COUNT: 15.1 %
EST. AVERAGE GLUCOSE BLD GHB EST-MCNC: 148 MG/DL (ref 68–126)
FASTING PATIENT LIPID ANSWER: YES
FASTING STATUS PATIENT QL REPORTED: YES
GLOBULIN PLAS-MCNC: 4 G/DL (ref 2.8–4.4)
GLUCOSE BLD-MCNC: 115 MG/DL (ref 70–99)
HBA1C MFR BLD: 6.8 % (ref ?–5.7)
HCT VFR BLD AUTO: 37 %
HDLC SERPL-MCNC: 82 MG/DL (ref 40–59)
HGB BLD-MCNC: 11.6 G/DL
IMM GRANULOCYTES # BLD AUTO: 0.04 X10(3) UL (ref 0–1)
IMM GRANULOCYTES NFR BLD: 0.4 %
LDLC SERPL CALC-MCNC: 65 MG/DL (ref ?–100)
LYMPHOCYTES # BLD AUTO: 3.86 X10(3) UL (ref 1–4)
LYMPHOCYTES NFR BLD AUTO: 37.2 %
MCH RBC QN AUTO: 26.2 PG (ref 26–34)
MCHC RBC AUTO-ENTMCNC: 31.4 G/DL (ref 31–37)
MCV RBC AUTO: 83.7 FL
MICROALBUMIN UR-MCNC: 5.65 MG/DL
MICROALBUMIN/CREAT 24H UR-RTO: 23 UG/MG (ref ?–30)
MONOCYTES # BLD AUTO: 0.66 X10(3) UL (ref 0.1–1)
MONOCYTES NFR BLD AUTO: 6.4 %
NEUTROPHILS # BLD AUTO: 5.35 X10 (3) UL (ref 1.5–7.7)
NEUTROPHILS # BLD AUTO: 5.35 X10(3) UL (ref 1.5–7.7)
NEUTROPHILS NFR BLD AUTO: 51.4 %
NONHDLC SERPL-MCNC: 90 MG/DL (ref ?–130)
OSMOLALITY SERPL CALC.SUM OF ELEC: 297 MOSM/KG (ref 275–295)
PLATELET # BLD AUTO: 307 10(3)UL (ref 150–450)
POTASSIUM SERPL-SCNC: 4.1 MMOL/L (ref 3.5–5.1)
PROT SERPL-MCNC: 7.5 G/DL (ref 6.4–8.2)
RBC # BLD AUTO: 4.42 X10(6)UL
SODIUM SERPL-SCNC: 142 MMOL/L (ref 136–145)
TRIGL SERPL-MCNC: 150 MG/DL (ref 30–149)
TSI SER-ACNC: 1.97 MIU/ML (ref 0.36–3.74)
VIT D+METAB SERPL-MCNC: 51.8 NG/ML (ref 30–100)
VLDLC SERPL CALC-MCNC: 22 MG/DL (ref 0–30)
WBC # BLD AUTO: 10.4 X10(3) UL (ref 4–11)

## 2023-11-13 PROCEDURE — 80061 LIPID PANEL: CPT

## 2023-11-13 PROCEDURE — 83036 HEMOGLOBIN GLYCOSYLATED A1C: CPT

## 2023-11-13 PROCEDURE — 80053 COMPREHEN METABOLIC PANEL: CPT

## 2023-11-13 PROCEDURE — 3044F HG A1C LEVEL LT 7.0%: CPT | Performed by: FAMILY MEDICINE

## 2023-11-13 PROCEDURE — 82306 VITAMIN D 25 HYDROXY: CPT

## 2023-11-13 PROCEDURE — 36415 COLL VENOUS BLD VENIPUNCTURE: CPT

## 2023-11-13 PROCEDURE — 85025 COMPLETE CBC W/AUTO DIFF WBC: CPT

## 2023-11-13 PROCEDURE — 82570 ASSAY OF URINE CREATININE: CPT

## 2023-11-13 PROCEDURE — 82043 UR ALBUMIN QUANTITATIVE: CPT

## 2023-11-13 PROCEDURE — 84443 ASSAY THYROID STIM HORMONE: CPT

## 2023-12-15 ENCOUNTER — OFFICE VISIT (OUTPATIENT)
Dept: FAMILY MEDICINE CLINIC | Facility: CLINIC | Age: 63
End: 2023-12-15
Payer: COMMERCIAL

## 2023-12-15 VITALS
HEART RATE: 84 BPM | OXYGEN SATURATION: 99 % | WEIGHT: 192 LBS | SYSTOLIC BLOOD PRESSURE: 134 MMHG | DIASTOLIC BLOOD PRESSURE: 56 MMHG | RESPIRATION RATE: 16 BRPM | BODY MASS INDEX: 34.02 KG/M2 | HEIGHT: 63 IN | TEMPERATURE: 97 F

## 2023-12-15 DIAGNOSIS — K21.9 GASTROESOPHAGEAL REFLUX DISEASE WITHOUT ESOPHAGITIS: ICD-10-CM

## 2023-12-15 DIAGNOSIS — M17.0 BILATERAL PRIMARY OSTEOARTHRITIS OF KNEE: ICD-10-CM

## 2023-12-15 DIAGNOSIS — E78.2 MIXED HYPERLIPIDEMIA: ICD-10-CM

## 2023-12-15 DIAGNOSIS — Z96.652 HISTORY OF LEFT KNEE REPLACEMENT: ICD-10-CM

## 2023-12-15 DIAGNOSIS — Z23 NEED FOR PNEUMOCOCCAL VACCINATION: ICD-10-CM

## 2023-12-15 DIAGNOSIS — D64.9 NORMOCYTIC ANEMIA: ICD-10-CM

## 2023-12-15 DIAGNOSIS — M54.16 LUMBAR RADICULOPATHY: ICD-10-CM

## 2023-12-15 DIAGNOSIS — I10 PRIMARY HYPERTENSION: ICD-10-CM

## 2023-12-15 DIAGNOSIS — Z51.81 ENCOUNTER FOR MEDICATION MONITORING: ICD-10-CM

## 2023-12-15 DIAGNOSIS — E11.9 CONTROLLED TYPE 2 DIABETES MELLITUS WITHOUT COMPLICATION, WITHOUT LONG-TERM CURRENT USE OF INSULIN (HCC): Primary | ICD-10-CM

## 2023-12-15 PROCEDURE — 3075F SYST BP GE 130 - 139MM HG: CPT | Performed by: FAMILY MEDICINE

## 2023-12-15 PROCEDURE — 99215 OFFICE O/P EST HI 40 MIN: CPT | Performed by: FAMILY MEDICINE

## 2023-12-15 PROCEDURE — 90677 PCV20 VACCINE IM: CPT | Performed by: FAMILY MEDICINE

## 2023-12-15 PROCEDURE — 3008F BODY MASS INDEX DOCD: CPT | Performed by: FAMILY MEDICINE

## 2023-12-15 PROCEDURE — 3078F DIAST BP <80 MM HG: CPT | Performed by: FAMILY MEDICINE

## 2023-12-15 PROCEDURE — 90471 IMMUNIZATION ADMIN: CPT | Performed by: FAMILY MEDICINE

## 2023-12-15 RX ORDER — TIZANIDINE 4 MG/1
TABLET ORAL
Qty: 30 TABLET | Refills: 1 | Status: SHIPPED | OUTPATIENT
Start: 2023-12-15

## 2023-12-15 RX ORDER — PREDNISONE 20 MG/1
20 TABLET ORAL DAILY
Qty: 5 TABLET | Refills: 0 | Status: SHIPPED | OUTPATIENT
Start: 2023-12-15 | End: 2023-12-20

## 2023-12-15 NOTE — PROGRESS NOTES
Fernanda Starkey is a 63 year old female.  HPI:  Patient is here for follow-up on recent test results and medication.  Has been eating healthier.  No regular exercise due to back and knee pain as below.  No polyuria or polydipsia.  Taking metformin ER twice daily.  No side effects.  Notes some flare of R low back pain for 1-2 weeks. Does get LBP on/off , chronic, intermittent but this flare-up seems to be more severe and lasting longer.  Pain radiating down right lower extremity and having difficulty with daily activities  Worse with walking and also at night, having diffficult time turning over in bed.  No known injury.  No lower extremity numbness or tingling.  R foot pain resolved.   No change in bowel or bladder habits.  Having a lot of R knee pain as well.  This is chronic, with intermittent flareups and difficulty with weightbearing on R knee. Only transient relief with injections in past .  Notes increased swelling in L knee at times, h/o LTKR but no sig pain in left knee.  Taking Tylenol as needed for pain.  Denies any chest pain, no shortness of breath, no palpitations.  No vision changes.        Current Outpatient Medications   Medication Sig Dispense Refill    tiZANidine 4 MG Oral Tab 1 tablet p.o. nightly for 10 days, then nightly as needed 30 tablet 1    BIOTIN OR Take 3,000 mcg by mouth daily.      Ferrous Sulfate 325 (65 Fe) MG Oral Tab Take 1 tablet (325 mg total) by mouth daily with breakfast. 90 tablet 1    omeprazole 20 MG Oral Capsule Delayed Release Take 1 capsule (20 mg total) by mouth every morning before breakfast. 90 capsule 2    metFORMIN  MG Oral Tablet 24 Hr Take 1 tablet (500 mg total) by mouth 2 (two) times daily with meals. 180 tablet 0    rosuvastatin 5 MG Oral Tab TAKE 1 TABLET BY MOUTH EVERY NIGHT 90 tablet 1    meclizine 12.5 MG Oral Tab 1 p.o. twice daily for 5 days, then twice daily as needed 30 tablet 0    zolpidem 10 MG Oral Tab Take 1 tablet (10 mg total) by mouth  nightly as needed for Sleep. 30 tablet 2    lisinopril-hydroCHLOROthiazide 10-12.5 MG Oral Tab Take 1 tablet by mouth daily. 90 tablet 2    Cholecalciferol (VITAMIN D3 OR) Take 5,000 Units by mouth daily.      diazePAM (VALIUM) 5 MG Oral Tab Take 1 tablet (5 mg total) by mouth nightly as needed. 30 tablet 1    Multiple Vitamins-Minerals (PRESERVISION AREDS) Oral Tab Take by mouth daily.      loratadine 10 MG Oral Tab Take 1 tablet (10 mg total) by mouth daily.      ASPIRIN 81 OR Take by mouth daily.           Past Medical History:   Diagnosis Date    Anxiety     Arthritis     Cataracts, bilateral     Diabetes mellitus (HCC)     Disorder of liver     fatty liver    Esophageal reflux     Flatulence/gas pain/belching     Hearing loss     High blood pressure     High cholesterol     Hyperlipidemia     Leg swelling     Lumbago     Meningioma (HCC)     Migraines     Obesity     Osteoarthritis     Pain in joints     Prediabetes     Seropositive rheumatoid arthritis (HCC)     Sleep apnea     Sprain of neck     Stress     Unspecified essential hypertension     Unspecified essential hypertension     Vertigo     Vitamin D deficiency     Wears glasses     Weight gain        Past Surgical History:   Procedure Laterality Date    CATARACT Bilateral     COLONOSCOPY  2020    Tubular Adenomas, recheck 5 years    COLONOSCOPY  10/02/2023    Internal Hemorrhoids. recheck 5 years    CONTRACEPT IUD      REMOVED    CYST ASPIRATION RIGHT      Breast Lesion - benign cyst    EGD  10/02/2023    Normal, Path benign    KNEE REPLACEMENT SURGERY Left 2020          OTHER      boil resection from inner thighs due to virus    OTHER SURGICAL HISTORY      groin abscess bilaterally         Social History     Socioeconomic History    Marital status:    Tobacco Use    Smoking status: Never     Passive exposure: Never    Smokeless tobacco: Never   Vaping Use    Vaping Use: Never used   Substance and Sexual Activity    Alcohol  use: No     Alcohol/week: 0.0 standard drinks of alcohol    Drug use: No   Other Topics Concern    Caffeine Concern Yes     Comment: 2 cups daily    Exercise No   Social History Narrative    ** Merged History Encounter **                     REVIEW OF SYSTEMS:  GENERAL HEALTH: feels well otherwise, mood is good  SKIN: denies any unusual skin lesions or rashes  RESPIRATORY: denies shortness of breath with exertion, no cough  CARDIOVASCULAR: denies chest pain on exertion  GI: denies abdominal pain and denies heartburn, GERD symptoms controlled with medication.  : normal bladder  NEURO: denies headaches, denies dizziness    EXAM:  /56   Pulse 84   Temp 97.3 °F (36.3 °C) (Temporal)   Resp 16   Ht 5' 3\" (1.6 m)   Wt 192 lb (87.1 kg)   SpO2 99%   BMI 34.01 kg/m²   Wt Readings from Last 6 Encounters:   12/28/23 189 lb 9.6 oz (86 kg)   12/15/23 192 lb (87.1 kg)   10/25/23 190 lb (86.2 kg)   10/02/23 190 lb (86.2 kg)   08/21/23 188 lb (85.3 kg)   08/02/23 190 lb (86.2 kg)     GENERAL: well developed, well nourished,in mild-moderate distress due to pain, pleasant affect  SKIN: no rashes,no suspicious lesions  HEENT: atraumatic, normocephalic,  Conj clear, EOMI  NECK: supple,no adenopathy,noTM  LUNGS: clear to auscultation  CARDIO: RRR without murmur  GI: NABS, soft, obese, no tenderness, no masses, no HSM, ND  EXTREMITIES: no cyanosis, clubbing or edema  Right knee with medial and lateral joint line tenderness.  Limited flexion due to pain.  Extension okay.  Negative anterior/posterior drawer signs.  Left knee with healed scar.  Mild swelling versus right.  Good range of motion.  Back: No spinal tenderness.  Right.  Lumbar tenderness/spasm.  Limited flexion.  Extension okay.  Positive right straight leg raise.  Motor strength 5/5 bilateral lower extremities.  NEURO: A&O x3, no focal deficits  Limping gait    ASSESSMENT AND PLAN:    1. Controlled type 2 diabetes mellitus without complication, without long-term  current use of insulin (HCC)  Reviewed diet and exercise.  Labs reviewed with fasting blood sugar 115.  Hemoglobin A1c 6.8.  Higher than previous.  Encourage compliance with diabetic diet, reviewed  Defers SMBG at this time.  Increase metformin ER to 500 mg, 1 in AM and 2 in PM.  Call if notes any side effects with higher dose.  Reviewed foot care. Noted foot xray with old healed fx of 5th metatarsal. No sig sxs now.   Urine for microalbumin/creatinine normal.  Continue ACE inhibitor and statin.    2. Lumbar radiculopathy  Reviewed back maintenance.  Has difficulty with tolerating NSAIDs due to GI side effects.  Advised oral steroid course as directed. Helped in past. Reviewed indication and possible side effects, including transient elevation in blood sugars.  Advised to call if notes any side effects.  Tylenol arthritis 2 tablets every 8 hours as needed.  Topical analgesics as needed.  Local ice/heat, alternate as directed.  Advised on HEP.  - XR LUMBAR SPINE (MIN 4 VIEWS) (CPT=72110); Future  - Physical Therapy Referral - [facility] Location  - tiZANidine 4 MG Oral Tab; 1 tablet p.o. nightly for 10 days, then nightly as needed  Dispense: 30 tablet; Refill: 1, warned of possible sedation.  - predniSONE 20 MG Oral Tab; Take 1 tablet (20 mg total) by mouth daily for 5 days. Take with food  Dispense: 5 tablet; Refill: 0    3. Bilateral primary osteoarthritis of knee  4. History of left knee replacement  Patient with knee symptoms that are affecting her daily activities.  Has known advanced osteoarthritis of the right knee.  Had left TKR with improvement in pain, but does get swelling  Knee brace as needed  Tylenol arthritis as needed  - Physical Therapy Referral - [facility]  - Ortho Referral - [facility] ([facility])    5. Primary hypertension  Blood pressure controlled.  Electrolytes normal.  CPM.      6. Mixed hyperlipidemia  Reviewed lipid panel with LDL at goal.  HDL good.  Triglycerides slightly elevated.   Cholesterol overall improved compared to previous.  LFTs normal.  Continue rosuvastatin nightly.     7. Normocytic anemia  Hemoglobin 11.6, normocytic.  WBC and platelet counts normal.  Has seen GI.  Had EGD and colonoscopy with benign findings.  Advised small capsule endoscopy and to follow-up with GI regarding this.  Continue iron supplement.    8. Gastroesophageal reflux disease without esophagitis  Avoid possible dietary triggers.  Symptoms controlled with omeprazole daily.  Weight loss will help.    9. Need for pneumococcal vaccination  Reviewed indication.  - Prevnar 20 (PCV20) [02903]    10. Encounter for medication monitoring    Vitamin D level normal.  Continue current supplement.  TSH normal

## 2023-12-18 ENCOUNTER — TELEPHONE (OUTPATIENT)
Dept: ORTHOPEDICS CLINIC | Facility: CLINIC | Age: 63
End: 2023-12-18

## 2023-12-18 DIAGNOSIS — M25.561 RIGHT KNEE PAIN, UNSPECIFIED CHRONICITY: ICD-10-CM

## 2023-12-18 DIAGNOSIS — M25.562 LEFT KNEE PAIN, UNSPECIFIED CHRONICITY: Primary | ICD-10-CM

## 2023-12-18 NOTE — TELEPHONE ENCOUNTER
Future Appointments   Date Time Provider Department Center   1/10/2024  8:40 AM Doug Wilson MD [facility]       This patient is coming for ERIC Knee swelling after 2yrs surgery w/ retired doctor. There was imaging done from last year . Please advise if additional views are needed for this appt. Thanks.'    Spouse may be reached at [phone]

## 2023-12-19 ENCOUNTER — HOSPITAL ENCOUNTER (OUTPATIENT)
Dept: GENERAL RADIOLOGY | Age: 63
Discharge: HOME | End: 2023-12-19
Attending: FAMILY MEDICINE
Payer: COMMERCIAL

## 2023-12-19 DIAGNOSIS — M54.16 LUMBAR RADICULOPATHY: ICD-10-CM

## 2023-12-19 PROCEDURE — 72110 X-RAY EXAM L-2 SPINE 4/>VWS: CPT | Performed by: FAMILY MEDICINE

## 2024-01-02 ENCOUNTER — TELEPHONE (OUTPATIENT)
Dept: FAMILY MEDICINE CLINIC | Facility: CLINIC | Age: 64
End: 2024-01-02

## 2024-01-02 DIAGNOSIS — E66.9 CLASS 1 OBESITY WITH SERIOUS COMORBIDITY AND BODY MASS INDEX (BMI) OF 33.0 TO 33.9 IN ADULT, UNSPECIFIED OBESITY TYPE: ICD-10-CM

## 2024-01-02 DIAGNOSIS — E11.9 CONTROLLED TYPE 2 DIABETES MELLITUS WITHOUT COMPLICATION, WITHOUT LONG-TERM CURRENT USE OF INSULIN (HCC): Primary | ICD-10-CM

## 2024-01-02 DIAGNOSIS — K21.9 GASTROESOPHAGEAL REFLUX DISEASE WITHOUT ESOPHAGITIS: ICD-10-CM

## 2024-01-02 DIAGNOSIS — K76.0 FATTY LIVER: ICD-10-CM

## 2024-01-02 NOTE — TELEPHONE ENCOUNTER
Dr. Lucas,  please advise if you need to see patient before referral.      LOV 12/15/23   DM   +8    GI Visit 12/28/23    PLAN:      Increase water to at least 50-60oz per day  Can add in Miralax as needed, 1/2 to 1 capful daily. Medication indication, side effects and alternatives discussed. Patient verbalized understanding.   Recommend referral to dietician-recommend discussing with PCP regarding one that works with insurance  Weight loss of 1-2 lbs per week for a total weight loss of 10% of your body weight in 6 months   Capsule endoscopy  Recheck CBC and iron studies  Continue daily iron supplement at this time  Continue Omeprazole 20mg daily, take 30 minutes prior to breakfast. Medication indication, side effects and alternatives discussed. Patient verbalized understanding.   Follow-up in 6 months or sooner if needed     Patient advised to follow up with their PCP for non-gastrointestinal complaints and conditions.      Pt verbalized understanding and is agreeable to the above plan.       Orders This Visit:      Orders Placed This Encounter   Procedures    CBC With Differential With Platelet    Iron And Tibc (7573)    Ferritin (457)

## 2024-01-02 NOTE — TELEPHONE ENCOUNTER
Pt's spouse called stating Pt was told by Lupe from [facility] to see a Dietician.  Did not want another diamond with Dr JOCELYN Garner recommendation only.  (Pt has an HMO - referral is needed).

## 2024-01-04 NOTE — TELEPHONE ENCOUNTER
Called and spoke to patient's spouse and informed of referral to Dietician. [facility] number given to call for appt.  Verbalizes understanding.

## 2024-01-05 ENCOUNTER — TELEPHONE (OUTPATIENT)
Dept: PHYSICAL THERAPY | Facility: HOSPITAL | Age: 64
End: 2024-01-05

## 2024-01-05 ENCOUNTER — LAB ENCOUNTER (OUTPATIENT)
Dept: LAB | Age: 64
End: 2024-01-05
Attending: FAMILY MEDICINE
Payer: COMMERCIAL

## 2024-01-05 ENCOUNTER — TELEPHONE (OUTPATIENT)
Dept: ENDOCRINOLOGY CLINIC | Facility: CLINIC | Age: 64
End: 2024-01-05

## 2024-01-05 DIAGNOSIS — D50.9 IRON DEFICIENCY ANEMIA, UNSPECIFIED IRON DEFICIENCY ANEMIA TYPE: ICD-10-CM

## 2024-01-05 LAB
BASOPHILS # BLD AUTO: 0.07 X10(3) UL (ref 0–0.2)
BASOPHILS NFR BLD AUTO: 0.8 %
DEPRECATED HBV CORE AB SER IA-ACNC: 29.6 NG/ML
EOSINOPHIL # BLD AUTO: 0.29 X10(3) UL (ref 0–0.7)
EOSINOPHIL NFR BLD AUTO: 3.2 %
ERYTHROCYTE [DISTWIDTH] IN BLOOD BY AUTOMATED COUNT: 14.6 %
HCT VFR BLD AUTO: 35.6 %
HGB BLD-MCNC: 11.2 G/DL
IMM GRANULOCYTES # BLD AUTO: 0.03 X10(3) UL (ref 0–1)
IMM GRANULOCYTES NFR BLD: 0.3 %
IRON SATN MFR SERPL: 12 %
IRON SERPL-MCNC: 60 UG/DL
LYMPHOCYTES # BLD AUTO: 3.36 X10(3) UL (ref 1–4)
LYMPHOCYTES NFR BLD AUTO: 37.2 %
MCH RBC QN AUTO: 26.5 PG (ref 26–34)
MCHC RBC AUTO-ENTMCNC: 31.5 G/DL (ref 31–37)
MCV RBC AUTO: 84.2 FL
MONOCYTES # BLD AUTO: 0.49 X10(3) UL (ref 0.1–1)
MONOCYTES NFR BLD AUTO: 5.4 %
NEUTROPHILS # BLD AUTO: 4.8 X10 (3) UL (ref 1.5–7.7)
NEUTROPHILS # BLD AUTO: 4.8 X10(3) UL (ref 1.5–7.7)
NEUTROPHILS NFR BLD AUTO: 53.1 %
PLATELET # BLD AUTO: 269 10(3)UL (ref 150–450)
RBC # BLD AUTO: 4.23 X10(6)UL
TIBC SERPL-MCNC: 501 UG/DL (ref 240–450)
TRANSFERRIN SERPL-MCNC: 336 MG/DL (ref 200–360)
WBC # BLD AUTO: 9 X10(3) UL (ref 4–11)

## 2024-01-05 PROCEDURE — 85025 COMPLETE CBC W/AUTO DIFF WBC: CPT

## 2024-01-05 PROCEDURE — 36415 COLL VENOUS BLD VENIPUNCTURE: CPT

## 2024-01-05 PROCEDURE — 83540 ASSAY OF IRON: CPT

## 2024-01-05 PROCEDURE — 82728 ASSAY OF FERRITIN: CPT

## 2024-01-05 PROCEDURE — 83550 IRON BINDING TEST: CPT

## 2024-01-05 NOTE — TELEPHONE ENCOUNTER
Received call from Stephenie from [facility], she was requesting if our office would be able to try and schedule the visit, since she was unable to schedule one on our end when the patient's spouse called. Pt was on hold, while checking that the referral could be placed, pt's spouse hung up. Will try to call back to schedule an appointment.

## 2024-01-08 ENCOUNTER — OFFICE VISIT (OUTPATIENT)
Dept: PHYSICAL THERAPY | Age: 64
End: 2024-01-08
Attending: FAMILY MEDICINE
Payer: COMMERCIAL

## 2024-01-08 DIAGNOSIS — M54.16 LUMBAR RADICULOPATHY: Primary | ICD-10-CM

## 2024-01-08 DIAGNOSIS — M17.0 BILATERAL PRIMARY OSTEOARTHRITIS OF KNEE: ICD-10-CM

## 2024-01-08 PROCEDURE — 97110 THERAPEUTIC EXERCISES: CPT

## 2024-01-08 PROCEDURE — 97162 PT EVAL MOD COMPLEX 30 MIN: CPT

## 2024-01-08 NOTE — PROGRESS NOTES
SPINE EVALUATION:     Diagnosis:   Lumbar radiculopathy (M54.16)  Bilateral primary osteoarthritis of knee (M17.0)      Referring Provider: Shayy  Date of Evaluation:    1/8/2024    Precautions:  Drug Allergy, Hearing impairment, Visual Impairment  Possible language barrier Next MD visit:   none scheduled  Date of Surgery: n/a     PATIENT SUMMARY   Fernanda Starkey is a 63 year old female who presents to therapy today with complaints of LBP, R>L knee pain. N/T RLE with standing. Pt reports increased LBP for about 4 months & thinks it may be due to her knee issues. Pt reports hx L TKA 2 yrs ago & is \"okay\" but has some pain; reports no hx R knee surgery but reports pain for about 6 mo.    Pt describes pain level current 7/10, at best 4/10, at worst 9/10.   Current functional limitations include standing, walking (needs to walk slower due to knee pain), laying down (hard to find position of comfort - wakes up from pain), bending forward, stair negotiation, sit to stand, donning socks.     Fernanda describes prior level of function: LBP for about 4 months, R knee pain for about 6 months. Pt goals include \"decrease pain.\"  Past medical history was reviewed with Fernanda. Significant findings include  has a past medical history of Anxiety, Arthritis, Cataracts, bilateral, Diabetes mellitus (HCC), Disorder of liver, Esophageal reflux, Flatulence/gas pain/belching, Hearing loss, High blood pressure, High cholesterol, Hyperlipidemia, Leg swelling, Lumbago, Meningioma (HCC), Migraines, Obesity, Osteoarthritis, Pain in joints, Prediabetes, Seropositive rheumatoid arthritis (HCC), Sleep apnea, Sprain of neck, Stress, Unspecified essential hypertension, Unspecified essential hypertension, Vertigo, Vitamin D deficiency, Wears glasses, and Weight gain.    She has no past medical history of Anesthesia complication, Difficult intubation, Elevated blood pressure reading without diagnosis of hypertension, Malignant  hyperthermia, Neoplasm of uncertain behavior of skin, PONV (postoperative nausea and vomiting), or Pseudocholinesterase deficiency.    ASSESSMENT  Fernanda presents to physical therapy evaluation with primary c/o LBP, R>L knee pain. The results of the objective tests and measures show impairments in posture, lumbar & knee ROM, joint mobility, strength, soft tissue mobility, flexibility, gait, balance. Functional deficits include but are not limited to standing, walking, laying down/ finding position of comfort, bending forward, stair negotiation, sit to stand, donning socks. Signs and symptoms are consistent with diagnosis of Lumbar radiculopathy (M54.16) Bilateral primary osteoarthritis of knee (M17.0). Pt and PT discussed evaluation findings, pathology, POC and HEP. Pt voiced understanding and performs HEP correctly without reported pain. Skilled Physical Therapy is medically necessary to address the above impairments and reach functional goals.     OBJECTIVE:   Observation/Posture: Stance: WBOS, increased thoracic kyphosis, decreased lumbar lordosis, B genu varum, B pes planus. Slower with bed mobility & sit to stand.  Neuro Screen: defer    Lumbar AROM: (* denotes performed with pain)  Flexion: Can restricted* & slower    Knee AROM (supine): R 0-104 degrees with increased discomfort & slower to complete, L 0-110 degrees    Accessory motion: Hypomob R tib-fem post/ IR & R pat-fem all planes with discomfort    Palpation: TTP R PSIS & R medial tib-fem joint line & R pes anserine; increased tension to B LPSM in stance; TTP & mild increased STRs to R glutes & piriformis in L sidelying    Strength: (* denotes performed with pain)  LE   Hip flexion (L2): R 4-/5; L 4/5  Hip abduction: R 2+/5; L 3-/5  Knee Flexion: R 3+/5; L 4+/5   Knee extension (L3): R 4-/5; L 4+/5   DF (L4): R 4/5; L 4+/5     Flexibility:   LE   Piriformis: R mod restricted; L mod restricted     Gait: pt ambulates on level ground with antalgia,  difficulty turning, difficulty with initiation, trendelenburg/waddle, and decreased heel strike R>L .  Balance: SLS R unable without HHA, L unable without HHA    Today’s Treatment and Response:   Pt education was provided on exam findings, treatment diagnosis, treatment plan, expectations, and prognosis. Pt was also provided recommendations for possible soreness after evaluation and shoe wear    Patient was instructed in and issued a HEP for:     Access Code: [email or web address]  URL: [email or web address]  Date: 01/08/2024  Prepared by: Emani Pedro    Program Notes  These exercises should not create increased pain. If you have increased pain, either modify if you can, or stop the exercise if unable to eliminate pain. Muscle soreness is likely & normal from the new strengthening you are doing which is different than pain.    Exercises  - Seated Hip Adduction Isometrics with Ball  - 1 x daily - 4 x weekly - 1 sets - 10 reps - 5 sec hold  - Seated Hip Abduction with Resistance  - 1 x daily - 4 x weekly - 1 sets - 10 reps - 5 sec hold    Charges: PT Eval Moderate Complexity, Therex x 1      Total Timed Treatment: 15 min     Total Treatment Time: 45 min     Based on clinical rationale and outcome measures, this evaluation involved Moderate Complexity decision making due to 3+ personal factors/comorbidities, 4+ body structures involved/activity limitations, and evolving symptoms including changing pain levels.  PLAN OF CARE:    Goals: (to be met in 8 visits)  Pt will demonstrate good understanding of proper posture and body mechanics to decrease pain and improve spinal safety   Pt will improve lumbar spine AROM flexion to Min restricted to allow increase ease with bending forward to don shoes/ pick items off floor  Pt will improve B hip abduction strength to 4-/5 for improved stability with standing & walking  Pt will improve R knee flexion strength to 4/5 for improved ability to complete stair ascent  Pt will improve R knee  extension strength to 4+/5 for improved ability to complete sit to stand transfers  Pt will improve R knee flexion AROM to 110 degrees for improved ability to don socks  Pt will have decreased lumbar paraspinal mm tension to tolerate standing >30 minutes for home activities  Pt will improve SLS to at least 10s R/L to improve safety with gait on uneven surfaces such as grass and gravel  Pt will be independent and compliant with comprehensive HEP to maintain progress achieved in PT    Frequency / Duration: Patient will be seen for 1-2 x/week or a total of 8 visits over a 90 day period. Treatment will include: Gait training, Manual Therapy, Neuromuscular Re-education, Self-Care Home Management, Therapeutic Activities, Therapeutic Exercise, and Home Exercise Program instruction    Education or treatment limitation: Communication - potential language barrier; pt declined needing  services today but was advised she may request & utilize for subsequent sessions as indicated.  Rehab Potential:good    Patient/Family/Caregiver was advised of these findings, precautions, and treatment options and has agreed to actively participate in planning and for this course of care.    Thank you for your referral. Please co-sign or sign and return this letter via fax as soon as possible to [phone]. If you have any questions, please contact me at Dept: [phone]    Sincerely,  Electronically signed by therapist: Emani Pedro, PT    Physician's certification required: Yes  I certify the need for these services furnished under this plan of treatment and while under my care.    X___________________________________________________ Date____________________    Certification From: 1/8/2024  To:4/7/2024

## 2024-01-08 NOTE — PATIENT INSTRUCTIONS
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 She subsequently completed her Doctor of Physical Therapy from [employer] in 2013. Emani’s experience is primarily with orthopedics, but also has experience in pediatrics as well.     Emani has been a certified provider of the [employer] instrument-assisted soft tissue mobilization since 2015 & has further earned the title of [employer] Specialist in 2020. Emani is continuing her passion for learning by pursuing training through the [facility] to effectively treat patients with pelvic floor related disorders. 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    Emani thrives on treating patients with empathy & compassion to provide the individualized care that all patients need & deserve. Emani aims to empower patients with the tools to feel in charge of their recovery, knowing they can advocate for themselves & achieve maximum success when they understand the treatments that work best for them.    When Emani is not at work, she enjoys exercising with barre classes, scenic walks outside, baking, & traveling with her family.     Emani is accepting new patients at the [facility], [place], [facility] by [facility]. To schedule or change an appointment, call [phone]. To speak with Emani, call [phone] or message on PHRQL.

## 2024-01-10 ENCOUNTER — OFFICE VISIT (OUTPATIENT)
Dept: ORTHOPEDICS CLINIC | Facility: CLINIC | Age: 64
End: 2024-01-10
Payer: COMMERCIAL

## 2024-01-10 ENCOUNTER — HOSPITAL ENCOUNTER (OUTPATIENT)
Dept: GENERAL RADIOLOGY | Age: 64
Discharge: HOME | End: 2024-01-10
Attending: ORTHOPAEDIC SURGERY
Payer: COMMERCIAL

## 2024-01-10 VITALS — HEIGHT: 61.75 IN | BODY MASS INDEX: 34.89 KG/M2 | WEIGHT: 189.63 LBS

## 2024-01-10 DIAGNOSIS — M25.561 RIGHT KNEE PAIN, UNSPECIFIED CHRONICITY: ICD-10-CM

## 2024-01-10 DIAGNOSIS — T84.84XA PAIN DUE TO TOTAL LEFT KNEE REPLACEMENT, INITIAL ENCOUNTER (HCC): Primary | ICD-10-CM

## 2024-01-10 DIAGNOSIS — M25.562 LEFT KNEE PAIN, UNSPECIFIED CHRONICITY: ICD-10-CM

## 2024-01-10 DIAGNOSIS — Z96.652 PAIN DUE TO TOTAL LEFT KNEE REPLACEMENT, INITIAL ENCOUNTER (HCC): Primary | ICD-10-CM

## 2024-01-10 DIAGNOSIS — M17.11 PRIMARY OSTEOARTHRITIS OF RIGHT KNEE: ICD-10-CM

## 2024-01-10 PROCEDURE — 99214 OFFICE O/P EST MOD 30 MIN: CPT | Performed by: ORTHOPAEDIC SURGERY

## 2024-01-10 PROCEDURE — 73564 X-RAY EXAM KNEE 4 OR MORE: CPT | Performed by: ORTHOPAEDIC SURGERY

## 2024-01-10 PROCEDURE — 73562 X-RAY EXAM OF KNEE 3: CPT | Performed by: ORTHOPAEDIC SURGERY

## 2024-01-10 PROCEDURE — 3008F BODY MASS INDEX DOCD: CPT | Performed by: ORTHOPAEDIC SURGERY

## 2024-01-10 NOTE — H&P
[facility] New Patient Note    CC:   Chief Complaint   Patient presents with    Knee Pain     Bilateral knee pain  left knee replaced previously 2 years ago patient states she has bilateral swelling        HPI: This 63 year old female presents today with complaints of bilateral knee pain.  She has history of left knee replacement just over 3 years ago by Dr. Taylor.  Her  relays the history.  They state that she has had pain worsening in her knee since a few months after surgery.  Initially the knee was doing okay, but subsequently got worse.  She reports pain and swelling symptoms.  When asked where the pain occurs she traces around the anterior inferior joint line, traces laterally somewhat around the IT band.  She states that the symptoms are worse with weightbearing and walking on level ground.  It is not as bothersome on stairs.  It does not bother her with rest.  She does feel that there is some clicking and looseness to the knee.  She has been evaluated by a few other providers including KRYSTAL Martinez and Dr. Marroquin with duly.  She has been worked up for infection which was negative.  She recently started physical therapy, did her first session, for both her left knee as well as the right which has been bothersome over the past few months now.  She reports that the right knee pain started about 3 to 4 months ago.  Pain is around the inside and outside joint lines.  Symptoms are worse with weightbearing.  She reports she did do steroid injection in the past, this provided only a few weeks of relief.    Past Medical History:   Diagnosis Date    Anxiety     Arthritis     Cataracts, bilateral     Diabetes mellitus (HCC)     Disorder of liver     fatty liver    Esophageal reflux     Flatulence/gas pain/belching     Hearing loss     High blood pressure     High cholesterol     Hyperlipidemia     Leg swelling     Lumbago     Meningioma (HCC)     Migraines     Obesity     Osteoarthritis     Pain in  joints     Prediabetes     Seropositive rheumatoid arthritis (HCC)     Sleep apnea     Sprain of neck     Stress     Unspecified essential hypertension     Unspecified essential hypertension     Vertigo     Vitamin D deficiency     Wears glasses     Weight gain      Past Surgical History:   Procedure Laterality Date    CATARACT Bilateral     COLONOSCOPY  2020    Tubular Adenomas, recheck 5 years    COLONOSCOPY  10/02/2023    Internal Hemorrhoids. recheck 5 years    CONTRACEPT IUD      REMOVED    CYST ASPIRATION RIGHT      Breast Lesion - benign cyst    EGD  10/02/2023    Normal, Path benign    KNEE REPLACEMENT SURGERY Left 2020          OTHER      boil resection from inner thighs due to virus    OTHER SURGICAL HISTORY      groin abscess bilaterally     Current Outpatient Medications   Medication Sig Dispense Refill    tiZANidine 4 MG Oral Tab 1 tablet p.o. nightly for 10 days, then nightly as needed 30 tablet 1    BIOTIN OR Take 3,000 mcg by mouth daily.      Ferrous Sulfate 325 (65 Fe) MG Oral Tab Take 1 tablet (325 mg total) by mouth daily with breakfast. 90 tablet 1    omeprazole 20 MG Oral Capsule Delayed Release Take 1 capsule (20 mg total) by mouth every morning before breakfast. 90 capsule 2    metFORMIN  MG Oral Tablet 24 Hr Take 1 tablet (500 mg total) by mouth 2 (two) times daily with meals. 180 tablet 0    rosuvastatin 5 MG Oral Tab TAKE 1 TABLET BY MOUTH EVERY NIGHT 90 tablet 1    meclizine 12.5 MG Oral Tab 1 p.o. twice daily for 5 days, then twice daily as needed 30 tablet 0    zolpidem 10 MG Oral Tab Take 1 tablet (10 mg total) by mouth nightly as needed for Sleep. 30 tablet 2    lisinopril-hydroCHLOROthiazide 10-12.5 MG Oral Tab Take 1 tablet by mouth daily. 90 tablet 2    Cholecalciferol (VITAMIN D3 OR) Take 5,000 Units by mouth daily.      diazePAM (VALIUM) 5 MG Oral Tab Take 1 tablet (5 mg total) by mouth nightly as needed. 30 tablet 1    Multiple Vitamins-Minerals  (PRESERVISION AREDS) Oral Tab Take by mouth daily.      loratadine 10 MG Oral Tab Take 1 tablet (10 mg total) by mouth daily.      ASPIRIN 81 OR Take by mouth daily.       Allergies   Allergen Reactions    Imitrex [Sumatriptan Succinate] NAUSEA ONLY     bleeding    Imitrex [Sumatriptan] HIVES    Nexium [Esomeprazole] ITCHING    Pollen     Radiology Contrast Iodinated Dyes RASH     Family History   Problem Relation Age of Onset    Other (Other) Father         tuberculosis    Other (Coronary artery disease) Father     Other (TB) Father     Dementia Mother     Hypertension Mother     Breast Cancer Sister 40        estimated age    Other (Coronary artery disease) Brother     No Known Problems Daughter     No Known Problems Son     No Known Problems Son     No Known Problems Son     Diabetes Sister     Arthritis Sister         TKA bilaterally    Diabetes Brother      Social History     Occupational History    Not on file   Tobacco Use    Smoking status: Never     Passive exposure: Never    Smokeless tobacco: Never   Vaping Use    Vaping Use: Never used   Substance and Sexual Activity    Alcohol use: No     Alcohol/week: 0.0 standard drinks of alcohol    Drug use: No    Sexual activity: Not on file        ROS:  Detailed system review obtained and negative except as mentioned above      Physical Exam:    Ht 5' 1.75\" (1.568 m)   Wt 189 lb 9.6 oz (86 kg)   BMI 34.96 kg/m²   Constitutional: Awake, alert, no distress.   Psychological: Appropriate affect.  Respiratory: Unlabored breathing.  Bilateral lower extremity:  Inspection: skin intact, healed anterior incision over the left knee, no erythema, mild edema about the knees, increased soft tissue thickness  Palpation: Right knee tender to palpation about the medial joint line.  Left knee tender to palpation more lateral than medial, distal IT band and mostly about the anterior lateral joint line.  There is mild tenderness medially about the proximal medial tibia and  anterior, nonfocal over pes tendons and MCL  Range of motion: Patient demonstrates full extension of both knees, flexion to around 100/110 on the left  Left knee with 5+ millimeters laxity to valgus stress in full extension, stable to varus stress.  In mid flexion, there is 3 to 4 mm varus laxity, appears to be valgus laxity as well without positive click.  At 90 degrees, anterior drawer about 5 mm, positive click to posterior drawer stress  Neuromuscular: 5 out of 5 bilateral quad strength, sensation intact  Vascular: Warm well-perfused      Imaging: X-rays of the left and right knees personally viewed, independently interpreted and radiology report read.  Right knee films demonstrate severe varus osteoarthritis with complete loss of medial joint space/bone-on-bone, subchondral sclerosis and osteophyte formation most pronounced in the medial compartment.  Left knee films demonstrate cemented Schuyler total knee arthroplasty with short stem extension.  There are no lucencies about the bone cement implant interface on the AP and no eccentric narrowing of the medial or lateral compartments, no appreciated progressive lucencies compared to previous films.  Patella tracking centrally.  Tibial component within 2 to 3 degrees of neutral on the AP/touch varus, lateral view with about 9 degrees posterior slope, femoral sizing/offset appears appropriate.    Previous films reviewed including November 2022 as well as postsurgical films in 2020, demonstrates no change in implant position.      Labs: Hemoglobin A1c 6.8      Reports: Previous note from Dr. Marroquin December 2021 reviewed.  Patient was seen at that time for left knee pain 1 year postoperatively after having total knee by Dr. Taylor.  Infection workup negative with CRP 0.99, ESR 27, aspiration negative alpha defense and/for 59 WBC/17% segmented neutrophils.  Aspiration procedure note from 12/7/2021 by Dr. Marroquin reviewed, 25 mL of viscous blood-tinged  fluid.      Assessment/Plan:  Diagnoses and all orders for this visit:    Pain due to total left knee replacement, initial encounter ([facility])    Left knee pain, unspecified chronicity  -     Pain Management Referral - In Network    Right knee pain, unspecified chronicity  -     Pain Management Referral - In Network    Primary osteoarthritis of right knee      Assessment: 63-year-old female with bilateral knee pain, right knee radiographically severe osteoarthritis and left knee history of total knee arthroplasty    Plan: From the right knee standpoint, I discussed the etiology, natural history, and management options for symptomatic knee osteoarthritis.  I discussed nonsurgical and surgical treatments, with nonsurgical treatments to include anti-inflammatory medications, injections, activity modification, weight loss, low impact exercise and possible therapy.  Surgery would be with knee replacement and is an elective operation reserved for when nonsurgical treatments no longer alleviate symptoms sufficiently.  Given her history with the left knee, she is not looking to proceed with right knee replacement at this time.  We did discuss potential etiologies of pain following knee replacement.  She has been worked up for infection in the past and this has been negative.  Implant positioning appears appropriate without signs of loosening or wear.  We did discuss potential for instability as a cause of symptoms.  Discussed treatment options including physical therapy/dynamic strengthening and stabilization, bracing, up to revision knee replacement.  They state revision is not something she would like to consider at all.  She has been doing a brace and recently started therapy.  We did discuss possibility of radiofrequency ablation both for arthritic knee pain as well as pain following knee replacement.  She is interested in proceeding with this and we did provide a referral to Dr. Ramos for both knees.  If she would like to  proceed with surgical management at any point in the future, she can contact the clinic for discussion and reevaluation.    Doug Wilson MD, FAAOS  [facility] Orthopedic Surgery  Phone [phone]  Fax [phone]

## 2024-01-16 ENCOUNTER — OFFICE VISIT (OUTPATIENT)
Dept: PHYSICAL THERAPY | Age: 64
End: 2024-01-16
Attending: FAMILY MEDICINE
Payer: COMMERCIAL

## 2024-01-16 PROCEDURE — 97110 THERAPEUTIC EXERCISES: CPT

## 2024-01-16 NOTE — PROGRESS NOTES
Diagnosis:   Lumbar radiculopathy (M54.16)  Bilateral primary osteoarthritis of knee (M17.0)      Referring Provider: Shayy  Date of Evaluation:   1/8/2024    Precautions:  Drug Allergy, Hearing impairment, Visual Impairment  Possible language barrier Next MD visit:   none scheduled  Date of Surgery: n/a   Insurance Primary/Secondary: [employer] [place] / N/A     # Auth Visits: 5v auth [id number]            Subjective: Pt reports feeling the same. Pt reports it is easier to don & doff shoes with the [employer] \"slip on\" style sneaker that she recently purchased. Pain increased on stairs. Pt reports feeling an ongoing heaviness on the R side of her back & in the leg.    Pain: 7/10      Objective: See Flowsheet for details      Assessment: Focusing on gentle mobility & strengthening with emphasis on pain levels being controlled with pt being told to avoid pushing through pain. Pt reported increased R knee pain after 4' on NuStep & thus further time deferred today. Pt reported increased pain with SB DKTC despite modification by moving from ball at heels to ball at lower legs- pt continued with increased R knee pain still & thus further reps deferred today. Pt had good tolerance to SKTC active stretch on the L though increased on the R & further reps deferred today. Able to transition from supine to sit without PT assistance.      Goals: (to be met in 8 visits)  Pt will demonstrate good understanding of proper posture and body mechanics to decrease pain and improve spinal safety   Pt will improve lumbar spine AROM flexion to Min restricted to allow increase ease with bending forward to don shoes/ pick items off floor  Pt will improve B hip abduction strength to 4-/5 for improved stability with standing & walking  Pt will improve R knee flexion strength to 4/5 for improved ability to complete stair ascent  Pt will improve R knee extension strength to 4+/5 for improved ability to complete sit to stand transfers  Pt will improve R  knee flexion AROM to 110 degrees for improved ability to don socks  Pt will have decreased lumbar paraspinal mm tension to tolerate standing >30 minutes for home activities  Pt will improve SLS to at least 10s R/L to improve safety with gait on uneven surfaces such as grass and gravel  Pt will be independent and compliant with comprehensive HEP to maintain progress achieved in PT    Plan: Continue with mobility/ stretching & strengthening as appropriate avoiding increased pain.  Date: 1/16/2024  TX#: 2/8 Date:                 TX#: 3/ Date:                 TX#: 4/ Date:                 TX#: 5/ Date:   Tx#: 6/   Therex: 39'  NuStep L2 x 4'  Hooklying hip Add Jorge with yellow ball squeeze, 5\" x 10  Hooklying hip Abd Jorge with Green TB 5\" x 10  Small range LTR x 5 ea (10x total)  SB DKTC x 3 reps: defer 2/2 increased pain  SKTC stretch 1x20\" ea  SAQ over wedge x 10 ea  PPT 2x5  Seated lumbar flexion rolling with SB x 10  Education: potential of normal soreness from new ex's today       HEP:   Access Code: [email or web address]  URL: [email or web address]  Date: 01/08/2024  Prepared by: Emani Pedro    Program Notes  These exercises should not create increased pain. If you have increased pain, either modify if you can, or stop the exercise if unable to eliminate pain. Muscle soreness is likely & normal from the new strengthening you are doing which is different than pain.    Exercises  - Seated Hip Adduction Isometrics with Ball  - 1 x daily - 4 x weekly - 1 sets - 10 reps - 5 sec hold  - Seated Hip Abduction with Resistance  - 1 x daily - 4 x weekly - 1 sets - 10 reps - 5 sec hold    Charges: Therex x 3       Total Timed Treatment: 39 min  Total Treatment Time: 39 min

## 2024-01-17 NOTE — TELEPHONE ENCOUNTER
Pt's spouse had already called front staff to schedule appointment before I was able to call back. Visit with Patricia scheduled.     Future Appointments   Date Time Provider Department Center   1/22/2024  3:00 PM Emani Pedro, PT [facility]   1/26/2024  9:00 AM Ramy Ramos MD [facility] [place]   1/29/2024  1:00 PM Candy Joshi RD [facility] [address]   1/30/2024  2:15 PM Emani Pedro PT [facility]   2/6/2024  2:15 PM Emani Pedro PT [facility]   2/12/2024 12:40 PM Inocencio Lucas MD [facility]   4/4/2024  9:20 AM Cristina Farmer PA-C [facility] [address]   7/1/2024  3:30 PM Lupe Barnett APRN [facility]

## 2024-01-22 ENCOUNTER — OFFICE VISIT (OUTPATIENT)
Dept: PHYSICAL THERAPY | Age: 64
End: 2024-01-22
Attending: FAMILY MEDICINE
Payer: COMMERCIAL

## 2024-01-22 PROCEDURE — 97110 THERAPEUTIC EXERCISES: CPT

## 2024-01-22 NOTE — PROGRESS NOTES
Diagnosis:   Lumbar radiculopathy (M54.16)  Bilateral primary osteoarthritis of knee (M17.0)      Referring Provider: Shayy  Date of Evaluation:   1/8/2024    Precautions:  Drug Allergy, Hearing impairment, Visual Impairment  Possible language barrier Next MD visit:   none scheduled  Date of Surgery: n/a   Insurance Primary/Secondary: [employer] / N/A     # Auth Visits: 5v auth 1/1-3/31            Subjective: Pt reports a little sore after last visit but then after 1-2 hours the pain was gone. Reports more discomfort to the back to day.    Pain: 7/10      Objective: See Flowsheet for details      Assessment: Able to perform longer time on NuStep today with 1) reducing level from 2 to 1 & 2) increasing the seat distance so pt does not need to bend her knee as much. Varied & intermittent pain (moreso on the R knee) present throughout session with PT being respective of pt's pain & deferring as able when pain noted. Pt reported muscle tightness to the R thigh with initial relief noted with modified helene stretch, though increased discomfort after 1.5 min (initial plan was for 2 min). Limited tolerance to transition between flex<>ext 2/2 R knee pain with PROM initiated in limited range to help improve both quality & quantity of motion, especially with incorporation of light (grade 1) tibial IR with flex for promoting proper joint mechanics. Educated in potential for normal muscle soreness after treatment today.      Goals: (to be met in 8 visits)  Pt will demonstrate good understanding of proper posture and body mechanics to decrease pain and improve spinal safety   Pt will improve lumbar spine AROM flexion to Min restricted to allow increase ease with bending forward to don shoes/ pick items off floor  Pt will improve B hip abduction strength to 4-/5 for improved stability with standing & walking  Pt will improve R knee flexion strength to 4/5 for improved ability to complete stair ascent  Pt will improve R knee  extension strength to 4+/5 for improved ability to complete sit to stand transfers  Pt will improve R knee flexion AROM to 110 degrees for improved ability to don socks  Pt will have decreased lumbar paraspinal mm tension to tolerate standing >30 minutes for home activities  Pt will improve SLS to at least 10s R/L to improve safety with gait on uneven surfaces such as grass and gravel  Pt will be independent and compliant with comprehensive HEP to maintain progress achieved in PT    Plan: Continue with mobility/ stretching & strengthening as appropriate avoiding increased pain. Depending on pt's feedback next visit, may progress/ advance HEP.  Date: 1/16/2024  TX#: 2/8 Date: 1/22/2024  TX#: 3/8 Date:                 TX#: 4/ Date:                 TX#: 5/ Date:   Tx#: 6/   Therex: 39'  NuStep L2 x 4'  Hooklying hip Add Jorge with yellow ball squeeze, 5\" x 10  Hooklying hip Abd Jorge with Green TB 5\" x 10  Small range LTR x 5 ea (10x total)  SB DKTC x 3 reps: defer 2/2 increased pain  SKTC stretch 1x20\" ea  SAQ over wedge x 10 ea  PPT 2x5  Seated lumbar flexion rolling with SB x 10  Education: potential of normal soreness from new ex's today Therex: 42'  NuStep L2->L1 x 6'  -started at level 2, down to level 1 for most of time - better tolerance  -increased seat to further distance away - better tolerance  SKTC stretch 2x20\" ea  LTR 5\" x 10 ea  Modified helene stretch off side of table x 1.5' ea  Ankle pump in active HS stretch position 2x10 L, 1x10 R  -use of sheet to hold on to for ease of performing  Seated PROM limited range flex<>ext with grade 1 tibial IR with flexion 2x10  Seated R HS curls (small range) YTB x 10  Seated lumbar flexion rolling with SB x 10  Educate: ice vs heat. Do not apply directly to skin, max of 10 min.      HEP:   Access Code: [email or web address]  URL: [email or web address]  Date: 01/08/2024  Prepared by: Emani Pedro    Program Notes  These exercises should not create increased pain. If you have  increased pain, either modify if you can, or stop the exercise if unable to eliminate pain. Muscle soreness is likely & normal from the new strengthening you are doing which is different than pain.    Exercises  - Seated Hip Adduction Isometrics with Ball  - 1 x daily - 4 x weekly - 1 sets - 10 reps - 5 sec hold  - Seated Hip Abduction with Resistance  - 1 x daily - 4 x weekly - 1 sets - 10 reps - 5 sec hold    Charges: Therex x 3       Total Timed Treatment: 42 min  Total Treatment Time: 42 min

## 2024-01-26 ENCOUNTER — OFFICE VISIT (OUTPATIENT)
Dept: PAIN CLINIC | Facility: CLINIC | Age: 64
End: 2024-01-26
Payer: COMMERCIAL

## 2024-01-26 ENCOUNTER — TELEPHONE (OUTPATIENT)
Dept: PAIN CLINIC | Facility: CLINIC | Age: 64
End: 2024-01-26

## 2024-01-26 VITALS — OXYGEN SATURATION: 97 % | DIASTOLIC BLOOD PRESSURE: 70 MMHG | SYSTOLIC BLOOD PRESSURE: 118 MMHG | HEART RATE: 99 BPM

## 2024-01-26 DIAGNOSIS — M17.12 PRIMARY OSTEOARTHRITIS OF LEFT KNEE: ICD-10-CM

## 2024-01-26 DIAGNOSIS — M25.562 CHRONIC PAIN OF LEFT KNEE: Primary | ICD-10-CM

## 2024-01-26 DIAGNOSIS — M54.16 LUMBAR RADICULOPATHY: ICD-10-CM

## 2024-01-26 DIAGNOSIS — G89.29 CHRONIC PAIN OF LEFT KNEE: Primary | ICD-10-CM

## 2024-01-26 PROCEDURE — 99204 OFFICE O/P NEW MOD 45 MIN: CPT | Performed by: ANESTHESIOLOGY

## 2024-01-26 PROCEDURE — 3074F SYST BP LT 130 MM HG: CPT | Performed by: ANESTHESIOLOGY

## 2024-01-26 PROCEDURE — 3078F DIAST BP <80 MM HG: CPT | Performed by: ANESTHESIOLOGY

## 2024-01-26 NOTE — PROGRESS NOTES
Location of Pain: right sided lower back, bilateral legs knees and feet    Date Pain Began: When Left knee surgery happened          Work Related:   No        Receiving Work Comp/Disability:   No    Numeric Rating Scale:  Pain at Present:  5                                                                                                            (No Pain) 0  to  10 (Worst Pain)                 Minimum Pain:   4  Maximum Pain  5    Distribution of Pain:    bilateral    Quality of Pain:    shooting    Origin of Pain:    Surgical complications    Aggravating Factors:    Walking    Past Treatments for Current Pain Condition:   Physical Therapy and Other medications, shots in left knee    Prior diagnostic testing for your pain:  knee xray

## 2024-01-26 NOTE — TELEPHONE ENCOUNTER
Order Questions    Question Answer   Anesthesia Type Local   Provider [facility]   CPT (Hit enter after each entry) NJX AA&/STRD GNCLR NRV [facility]   Medical clearance requested (will send to Pain Navigator) No   Patient has Medicare coverage? No   Comments (Please list entire procedure name here.) left genicular nerve block

## 2024-01-26 NOTE — H&P
Name: Fernanda Starkey   : 12/10/1960   DOS: 2024     Chief complaint: Bilateral knee pain    History of present illness:  Fernanda Starkey is a 63 year old female with a history of hypertension, hyperlipidemia, and diabetes who presents today for evaluation of chronic knee pain.  The patient is status post left total knee arthroplasty with persistent discomfort surrounding the knee.  This is failed physical therapy, injections, and conservative management to date.  The patient was seen by Dr. Wilson with imaging consistent with left total knee arthroplasty with prosthesis in good position.  Therefore, patient was referred to discuss genicular nerve block and possible radiofrequency ablation.    Additionally, she also complains of low back pain.  Does have imaging with evidence of degenerative changes and mild scoliosis.  This does occasionally radiate below the knee.    She denies any chills, fever or weakness. She denies any bladder or bowel incontinence.      Past Medical History:   Diagnosis Date    Anxiety     Arthritis     Cataracts, bilateral     Diabetes mellitus (HCC)     Disorder of liver     fatty liver    Esophageal reflux     Flatulence/gas pain/belching     Hearing loss     High blood pressure     High cholesterol     Hyperlipidemia     Leg swelling     Lumbago     Meningioma (HCC)     Migraines     Obesity     Osteoarthritis     Pain in joints     Prediabetes     Seropositive rheumatoid arthritis (HCC)     Sleep apnea     Sprain of neck     Stress     Unspecified essential hypertension     Unspecified essential hypertension     Vertigo     Vitamin D deficiency     Wears glasses     Weight gain       Current Outpatient Medications   Medication Sig Dispense Refill    tiZANidine 4 MG Oral Tab 1 tablet p.o. nightly for 10 days, then nightly as needed 30 tablet 1    BIOTIN OR Take 3,000 mcg by mouth daily.      Ferrous Sulfate 325 (65 Fe) MG Oral Tab Take 1 tablet (325 mg total) by mouth  daily with breakfast. 90 tablet 1    omeprazole 20 MG Oral Capsule Delayed Release Take 1 capsule (20 mg total) by mouth every morning before breakfast. 90 capsule 2    metFORMIN  MG Oral Tablet 24 Hr Take 1 tablet (500 mg total) by mouth 2 (two) times daily with meals. 180 tablet 0    rosuvastatin 5 MG Oral Tab TAKE 1 TABLET BY MOUTH EVERY NIGHT 90 tablet 1    meclizine 12.5 MG Oral Tab 1 p.o. twice daily for 5 days, then twice daily as needed 30 tablet 0    zolpidem 10 MG Oral Tab Take 1 tablet (10 mg total) by mouth nightly as needed for Sleep. 30 tablet 2    lisinopril-hydroCHLOROthiazide 10-12.5 MG Oral Tab Take 1 tablet by mouth daily. 90 tablet 2    Cholecalciferol (VITAMIN D3 OR) Take 5,000 Units by mouth daily.      diazePAM (VALIUM) 5 MG Oral Tab Take 1 tablet (5 mg total) by mouth nightly as needed. 30 tablet 1    Multiple Vitamins-Minerals (PRESERVISION AREDS) Oral Tab Take by mouth daily.      loratadine 10 MG Oral Tab Take 1 tablet (10 mg total) by mouth daily.      ASPIRIN 81 OR Take by mouth daily.       Past Surgical History:   Procedure Laterality Date    CATARACT Bilateral     COLONOSCOPY  2020    Tubular Adenomas, recheck 5 years    COLONOSCOPY  10/02/2023    Internal Hemorrhoids. recheck 5 years    CONTRACEPT IUD      REMOVED    CYST ASPIRATION RIGHT      Breast Lesion - benign cyst    EGD  10/02/2023    Normal, Path benign    KNEE REPLACEMENT SURGERY Left 2020          OTHER      boil resection from inner thighs due to virus    OTHER SURGICAL HISTORY      groin abscess bilaterally      Family History   Problem Relation Age of Onset    Other (Other) Father         tuberculosis    Other (Coronary artery disease) Father     Other (TB) Father     Dementia Mother     Hypertension Mother     Breast Cancer Sister 40        estimated age    Other (Coronary artery disease) Brother     No Known Problems Daughter     No Known Problems Son     No Known Problems Son     No Known  Problems Son     Diabetes Sister     Arthritis Sister         TKA bilaterally    Diabetes Brother      Social History     Socioeconomic History    Marital status:    Tobacco Use    Smoking status: Never     Passive exposure: Never    Smokeless tobacco: Never   Vaping Use    Vaping Use: Never used   Substance and Sexual Activity    Alcohol use: No     Alcohol/week: 0.0 standard drinks of alcohol    Drug use: No   Other Topics Concern    Caffeine Concern Yes     Comment: 2 cups daily    Exercise Yes     Comment: 1x week   Social History Narrative    ** Merged History Encounter **            Review of  other systems:  10 point ROS otherwise negative    Physical examination: Fernanda is a 63 year old female not in acute distress  /70 (BP Location: Left arm, Patient Position: Sitting)   Pulse 99   SpO2 97%    The patient is awake, alert, oriented and corporative. She has a normal affect. The patient ambulates with normal gait.  HEENT: No gross lesion noted. PEERL. No icterus.  Neck and Upper Extremity: Supple. No thyromegaly or lymphadenopathy.  Bilateral upper EXTR and range of motion is intact  Cardiovascular system: No peripheral edema.  Respiratory system: Speech is nonlabored  Abdomen: Soft, nontender  Neurologic:  Cranial nerves II through XII are grossly intact.       Examination of the lower back:     Motor Examination:   (R)   (L)   Hip Abduction:   5    5   Hip Flexion:    5    5   Knee Extension:   5    5   Knee Flexion:    5    5   Ant. Tibialis:    5    5  Extensor Hallucis Longus:   5    5  Peroneals:     5    5  Gastrocsoleus:     5    5       Radiology diagnostic studies:   Lumbar x-ray reviewed with degenerative changes in the lower lumbar segments.  There is foraminal stenosis    Knee x-ray reviewed without acute process.  Evidence of TKA with prosthetic in good anatomic location    Assessment:  Encounter Diagnoses   Name Primary?    Chronic pain of left knee Yes    Lumbar radiculopathy      Primary osteoarthritis of left knee    .      Plan:     The patient is a 63-year-old female who presents today for evaluation of low back pain along with persistent knee pain.  Patient has a history of prior left total knee replacement.  However, has had persistent pain following that.  This has been treated extensively with physical therapy, injections.  Infectious workup has also been negative.  Imaging of the left knee shows evidence of total knee replacement with implant in good location.  Therefore, patient referred here to discuss additional treatment options.  Recommended genicular nerve block with possible radiofrequency ablations.    Patient also complains of persistent back pain.  This is likely due to degenerative changes with facet arthropathy seen on plain film.  Patient does report some radicular symptoms.  Therefore, I did order MRI.        Ramy Ramos MD MPH  Pain Management            Yes

## 2024-01-26 NOTE — TELEPHONE ENCOUNTER
PATIENT NAME:  MELVIN ALMEIDA/ [phone] (home)/ There is no work phone number on file.  PATIENT :  12/10/1960  REFERRAL ID #:  [id number]  REFERRAL STATUS:  Authorized [1]  REVIEW REFERRAL NOTES FOR MORE INFORMATION:  DATE AUTHORIZED:  2024 // EXPIRATION DATE: 2025   REFERRED BY:  ABI FERNANDO MD (TEL: [phone])  REFERRED TO: ABI FERNANDO MD (TEL: [phone])     No vendor specified on referral. / No vendor phone number known.  No facility specified on referral  REFERRED FOR:    Diagnoses:    M25.562, G89.29 (ICD-10-CM) - 719.46, 338.29 (ICD-9-CM) - Chronic pain of left knee  Procedures:     [id number] - [facility] PAIN NAVIGATOR   NUMBER OF VISITS AUTH: 1

## 2024-01-26 NOTE — PATIENT INSTRUCTIONS
Refill policies:    Allow 2-3 business days for refills; controlled substances may take longer.  Contact your pharmacy at least 5 days prior to running out of medication and have them send an electronic request or submit request through the “request refill” option in your [employer] account.  Refills are not addressed on weekends; covering physicians do not authorize routine medications on weekends.  No narcotics or controlled substances are refilled after noon on Fridays or by on call physicians.  By law, narcotics must be electronically prescribed.  A 30 day supply with no refills is the maximum allowed.  If your prescription is due for a refill, you may be due for a follow up appointment.  To best provide you care, patients receiving routine medications need to be seen at least once a year.  Patients receiving narcotic/controlled substance medications need to be seen at least once every 3 months.  In the event that your preferred pharmacy does not have the requested medication in stock (e.g. Backordered), it is your responsibility to find another pharmacy that has the requested medication available.  We will gladly send a new prescription to that pharmacy at your request.    Scheduling Tests:    If your physician has ordered radiology tests such as MRI or CT scans, please contact Central Scheduling at [phone] right away to schedule the test.  Once scheduled, the [facility] Centralized Referral Team will work with your insurance carrier to obtain pre-certification or prior authorization.  Depending on your insurance carrier, approval may take 3-10 days.  It is highly recommended patients assure they have received an authorization before having a test performed.  If test is done without insurance authorization, patient may be responsible for the entire amount billed.      Precertification and Prior Authorizations:  If your physician has recommended that you have a procedure or additional testing performed the [facility]  Centralized Referral Team will contact your insurance carrier to obtain pre-certification or prior authorization.    You are strongly encouraged to contact your insurance carrier to verify that your procedure/test has been approved and is a COVERED benefit.  Although the [facility] Centralized Referral Team does its due diligence, the insurance carrier gives the disclaimer that \"Although the procedure is authorized, this does not guarantee payment.\"    Ultimately the patient is responsible for payment.   Thank you for your understanding in this matter.  Paperwork Completion:  If you require FMLA or disability paperwork for your recovery, please make sure to either drop it off or have it faxed to our office at [phone]. Be sure the form has your name and date of birth on it.  The form will be faxed to our Forms Department and they will complete it for you.  There is a 25$ fee for all forms that need to be filled out.  Please be aware there is a 10-14 day turnaround time.  You will need to sign a release of information (MAYCOL) form if your paperwork does not come with one.  You may call the Forms Department with any questions at [phone].  Their fax number is [phone].

## 2024-01-26 NOTE — PROGRESS NOTES
Patient presents in office today with reported pain in bilateral knees    Current pain level reported = 5/10    Last reported dose of NA today

## 2024-01-29 ENCOUNTER — DIABETIC EDUCATION (OUTPATIENT)
Dept: ENDOCRINOLOGY CLINIC | Facility: CLINIC | Age: 64
End: 2024-01-29
Payer: COMMERCIAL

## 2024-01-29 VITALS — BODY MASS INDEX: 35 KG/M2 | WEIGHT: 190 LBS

## 2024-01-29 DIAGNOSIS — E11.9 CONTROLLED TYPE 2 DIABETES MELLITUS WITHOUT COMPLICATION, WITHOUT LONG-TERM CURRENT USE OF INSULIN (HCC): Primary | ICD-10-CM

## 2024-01-29 NOTE — TELEPHONE ENCOUNTER
Spoke with patient's spouse Crispin (Ok'd per HIPAA) to schedule injection.     Advised patient's spouse that patient's current Verbal Release does not include him, but the previous Verbal Release scanned in do include him (Confirmed this information w/Supervisor). Reminded Crispin that next time in office to update Verbal Release to also include him. Crispin CHUN.     Patient's spouse advised of insurance approval to proceed with injections and is agreeable to scheduling. Patient scheduled for procedure, pre-procedure instructions reviewed. Patient prefers Local sedation. Reviewed sedation instructions including No Fasting,No  and No Covid Test Required. Patient's spouse encouraged but not required to hold ASA 81 mg for 24 hours prior to procedure. Patient's spouse verbalized understanding of instructions, no further needs at this time.      [facility]  PRE-PROCEDURE INSTRUCTIONS WITHOUT SEDATION    Procedure: Left Genicular Nerve Block        Appointment Date: 02/01/2024  Check-In Time: 01:15 PM      Prior to the procedure:  Please update us prior to the procedure if you are experiencing any symptoms of infection such as cough, fever, chills, urinary symptoms, or have recently been prescribed antibiotics, have open wounds, have recently had surgery or dental procedures.    Day of Procedure:  **Drivers will be required for patients who receive prescriptions for Valium.    NO FASTING REQUIRED  Please bring your Insurance Card, Photo ID, List of Current Medications and Referral (if applicable) to your appointment.  Please park in the [employer] [facility] and follow the signs to the [facility].  Check in at [facility] ([address] [place] [address]) outpatient registration in the [facility].  Please note-No prescriptions will be written by Pain Clinic in OR on the day of procedure. If you require a refill of medications, please contact the office 48 hours prior to your procedure.  If  you have an implanted Spinal Cord or Peripheral Nerve Stimulator: Please remember to turn device off for procedure.        Medication Hold:    Number of days you need to be off for the following medications:    Aggrenox 10 days   Agrylin (Anagrelide) 10 days  Brilinta (Ticagrelor) 7 days  Imbruvica (Ibrutinib) 3 days   Enbrel (Etanercept) 24 hours   Fragmin (Dalteparin) 24 hours   Pletal (Cilostazol) 7 days  Effient (Prasugrel) 7 days  Pradaxa 10 days  Trental 7 days  Eliquis (Apixaban) 3 days  Xarelto (Rivaroxaban) 3 days  Lovenox (Enoxaparin) 24 hours  Aspirin  Greater than 81mg but less than 325mg   5 days  325mg and greater                  7 days  Coumadin       5 days  Procedure may be cancelled if INR is elevated.   Excedrin (with aspirin) 7 days  Plavix (Clopidogrel)                            7 days    NSAIDs: 24 hours preferred      Ibuprofen (Motrin, Advil, Vicoprofen), Naproxen (Naprosyn, Aleve), Piroxcam (Feldene), Meloxicam (Mobic), Oxaprozin (Daypro), Diclofenac (Voltaren), Indomethacin (Indocin), Etodolac (Lodine), Nabumetone (Relafen), Celebrex (Celecoxib)           HERBAL SUPPLEMENTS  5 days preferred  Fish oil, krill oil, Omega-3, Vascepa, Vitamin E, Turmeric, Garlic                       Insurance Authorization:   Most insurances are now requiring a preauthorization for all procedures.  In the event that your insurance does not authorize your procedure within 48 hours of the scheduled date, your procedure will be cancelled and rescheduled to a later date.  Please contact your insurance carrier to determine what your financial responsibility will be for the procedure(s).      Cancellation/Rescheduling Appointment:   In the event you need to cancel or reschedule your appointment, you must notify the office 24 hours prior.    Post-procedure instructions:        Please schedule a follow up visit within 2 to 4 weeks after your last procedure date   Please call our office with any questions or concerns  before or after your procedure at  [phone].  If you are a diabetic, please increase the frequency of your glucose monitoring after the procedure as this may cause a temporary increase in your blood sugar.  Contact your primary care physician if your blood sugar rises as you may require some medication adjustment.  It is normal to have increased pain at injection site for up to 3-5 days after procedure, you can use heat or ice (20 minutes on 20 minutes off) for comfort.    **To hear a recorded version of these instructions, please call [phone] and follow the prompts.  **Para escuchar las instrucciones en Español, por favor de llamar el wilmar [phone] opción 4.

## 2024-01-29 NOTE — PATIENT INSTRUCTIONS
Goals to work towards:    Aim to follow a meal plan with a consistent amount of carbohydrates for meals and snacks:  Goal for meals is 30-45 grams of carbohydrates for each meal (3 meals per day)  Goal for snacks is 15 grams of carbohydrates for each snack (1-2 per day)    Aim to include carbohydrates plus protein with meals and snacks    You can also use the Plate Method as a model for your meals:  1/2 of your plate is non-starchy vegetables, 1/4 of your plate is lean protein, 1/4 of your plate is starchy or carbohydrate foods        Blood Glucose Goals  Blood sugar goals: less than 130 mg/dl in the morning (fasting) and less than 180 mg/dl 2 hours after meals.  Keep glucose tabs or fast acting carbohydrates with you for treatment of lows; for blood glucose levels less than 70 mg/dl, take 15 grams of fast acting carbohydrates (3-4 glucose tabs, 4 ounces of juice, or as discussed). Retest in 15 min. If not above 70 mg/dl, retreat.

## 2024-01-29 NOTE — PROGRESS NOTES
Fernanda Starkey 12/10/1960 was seen for individual Diabetic Medical Nutrition Therapy- an initial consult:    Date: 2024   Referral: Inocencio Lucas MD Start time 1:00 pm  End time: 2:00 pm    63 year old female presents for medical nutrition therapy for type 2 diabetes. Was accompanied by her  today who assisted with some German translation. Notes weight of 185 lbs at home. She would like assistance with diet.      Anthropometrics:  Wt Readings from Last 6 Encounters:   24 190 lb   01/10/24 189 lb 9.6 oz   23 189 lb 9.6 oz   12/15/23 192 lb   10/25/23 190 lb   10/02/23 190 lb         Current diabetes medications:  Oral:  Metformin  mg - 1 tab in AM and 1 tab in PM  *Notes PCP is to raise dose to 750 mg after next appt      Labs:  Lab Results   Component Value Date    A1C 6.8 (H) 2023    A1C 6.6 (H) 2023    CHOLEST 172 2023    CHOLEST 246 (H) 2023    LDL 65 2023     (H) 2023    HDL 82 (H) 2023    HDL 84 (H) 2023    NONHDLC 90 2023    NONHDLC 162 (H) 2023    TRIG 150 (H) 2023    TRIG 120 2023    BUN 19 2023    BUN 16 2023    CREATSERUM 1.02 2023    CREATSERUM 0.84 2023    GFRNAA 91 12/15/2021    GFRNAA 72 2021    GFRAA 105 12/15/2021    GFRAA 83 2021       Lab Results   Component Value Date    A1C 6.8 (H) 2023    A1C 6.6 (H) 2023    A1C 6.1 (H) 2022       Glucose testing at home:     Currently checking BG: Yes,  1 time/day, fasting    BG results: per patient report   FB (this morning) 127-156 mg/dl        Diet History:  Usually eats about 2 meals/day (breakfast and dinner-6:30 pm, may eat smaller meal/snack about 3:00/3:30 pm, may have cereal before bed if hungry).   (a.m.) 9:30 am/10:00 am - Today ate honey oat cereal with milk, tea with sugar (~1 spoonful). Sometimes has egg and bread.  (p.m.) Yesterday ate rice, chicken, small amount of  salad  (snacks) Does not usually have snacks. Sometimes may include between breakfast and dinner if hungry - pakora (made with gram flour, may include spinach, potato)   (Beverages) tea and coffee w/powdered milk and sugar, minimal amount of water      Physical Activity:   Current activity includes daily housework      Nutrition Diagnosis  PES: Altered nutrition related laboratory values related to knowledge deficit as evidenced by elevated HBA1c and report of inaccurate knowledge      Intervention  -Comprehensive Nutrition Education Provided:  Reviewed carbohydrate counting and label reading.  Recommended carbohydrate targets of 30-45 grams at meals and 15 grams at snacks.  Use measuring cups (for a week) to assist with appropriate serving sizes.  Reviewed the MyPlate method with focus on balanced macronutrient consumption; identifying foods that are carbohydrates, lean protein, non-starchy vegetables, and heart healthy fats.      -Education on Increased Physical Activity:  discussed how increased physical activity improves insulin resistance, blood glucose control, and heart health      Monitoring/Evaluation  Diet modification/understanding  Blood sugars  Hemoglobin A1c      Recommendations  Practice healthful eating patterns, emphasizing a variety of nutrient dense foods in appropriate portion sizes.    Monitor carbohydrate intake with the MyPlate method   Practice carbohydrate counting and label reading  Start including some blood sugar checks 2 hours after a meal to help see impact on blood sugars      Patient Specific Goals:  Aim to follow a meal plan with a consistent amount of carbohydrates for meals and snacks:  Goal for meals is 30-45 grams of carbohydrates for each meal (3 meals per day)  Goal for snacks is 15 grams of carbohydrates for each snack (1-2 per day)    Aim to include carbohydrates plus protein with meals and snacks    You can also use the Plate Method as a model for your meals:  1/2 of your  plate is non-starchy vegetables, 1/4 of your plate is lean protein, 1/4 of your plate is starchy or carbohydrate foods      Blood Glucose Goals  Blood sugar goals: less than 130 mg/dl in the morning (fasting) and less than 180 mg/dl 2 hours after meals.  Keep glucose tabs or fast acting carbohydrates with you for treatment of lows; for blood glucose levels less than 70 mg/dl, take 15 grams of fast acting carbohydrates (3-4 glucose tabs, 4 ounces of juice, or as discussed). Retest in 15 min. If not above 70 mg/dl, retreat.      Follow up: 4/30/2024 Candy Joshi, MARISA Joshi, MARISAN, LDN, [facility]

## 2024-01-30 ENCOUNTER — OFFICE VISIT (OUTPATIENT)
Dept: PHYSICAL THERAPY | Age: 64
End: 2024-01-30
Attending: FAMILY MEDICINE
Payer: COMMERCIAL

## 2024-01-30 PROCEDURE — 97110 THERAPEUTIC EXERCISES: CPT

## 2024-01-30 NOTE — PROGRESS NOTES
Diagnosis:   Lumbar radiculopathy (M54.16)  Bilateral primary osteoarthritis of knee (M17.0)      Referring Provider: Shayy  Date of Evaluation:   1/8/2024    Precautions:  Drug Allergy, Hearing impairment, Visual Impairment  Possible language barrier Next MD visit:   none scheduled  Date of Surgery: n/a   Insurance Primary/Secondary: [employer] [place] HMO / N/A     # Auth Visits: 5v auth [id number]/31            Subjective: Pt reports symptoms about the same.    Pain: 7/10 R knee, 5/10 L knee      Objective: See Flowsheet for details      Assessment: Continues with improved tolerance to stretching with use of sheet to hold on to for ease of performing pulling motion. Pt appears more limited by R vs L knee pain. Focusing on being mindful of pt's pain to avoid increasing pain with treatment.      Goals: (to be met in 8 visits)  Pt will demonstrate good understanding of proper posture and body mechanics to decrease pain and improve spinal safety   Pt will improve lumbar spine AROM flexion to Min restricted to allow increase ease with bending forward to don shoes/ pick items off floor  Pt will improve B hip abduction strength to 4-/5 for improved stability with standing & walking  Pt will improve R knee flexion strength to 4/5 for improved ability to complete stair ascent  Pt will improve R knee extension strength to 4+/5 for improved ability to complete sit to stand transfers  Pt will improve R knee flexion AROM to 110 degrees for improved ability to don socks  Pt will have decreased lumbar paraspinal mm tension to tolerate standing >30 minutes for home activities  Pt will improve SLS to at least 10s R/L to improve safety with gait on uneven surfaces such as grass and gravel  Pt will be independent and compliant with comprehensive HEP to maintain progress achieved in PT    Plan: Pt has genicular nerve block scheduled for 2/1/2024. Pt has 1 more visit scheduled. Tentative plan for discharge at this time.  Date: 1/16/2024  TX#: 2/8  Date: 1/22/2024  TX#: 3/8 Date: 1/30/2024  TX#: 4/8 Date:                 TX#: 5/ Date:   Tx#: 6/   Therex: 39'  NuStep L2 x 4'  Hooklying hip Add Jorge with yellow ball squeeze, 5\" x 10  Hooklying hip Abd Jorge with Green TB 5\" x 10  Small range LTR x 5 ea (10x total)  SB DKTC x 3 reps: defer 2/2 increased pain  SKTC stretch 1x20\" ea  SAQ over wedge x 10 ea  PPT 2x5  Seated lumbar flexion rolling with SB x 10  Education: potential of normal soreness from new ex's today Therex: 42'  NuStep L2->L1 x 6'  -started at level 2, down to level 1 for most of time - better tolerance  -increased seat to further distance away - better tolerance  SKTC stretch 2x20\" ea  LTR 5\" x 10 ea  Modified helene stretch off side of table x 1.5' ea  Ankle pump in active HS stretch position 2x10 L, 1x10 R  -use of sheet to hold on to for ease of performing  Seated PROM limited range flex<>ext with grade 1 tibial IR with flexion 2x10  Seated R HS curls (small range) YTB x 10  Seated lumbar flexion rolling with SB x 10  Educate: ice vs heat. Do not apply directly to skin, max of 10 min. Therex: 38'  NuStep L1 x 4' - defer add'l time 2/2 increased ache R thigh  Seated lumbar flexion rolling with SB 2x10  LTR 5\" x 10 ea  Ankle pump in active HS stretch position x 15 ea  -use of sheet to hold on to for ease of performing  Clams x 15 ea  SKTC stretch 2x30\"  -use of sheet to hold on to for ease of performing  HEP update: education to defer if increased pain noted  Discharge Planning     HEP:   Access Code: [email or web address]  URL: [email or web address]  Date: 01/08/2024  Prepared by: Emani Pedro    Program Notes  These exercises should not create increased pain. If you have increased pain, either modify if you can, or stop the exercise if unable to eliminate pain. Muscle soreness is likely & normal from the new strengthening you are doing which is different than pain.    Exercises  - Seated Hip Adduction Isometrics with Ball  - 1 x daily - 4 x weekly - 1  sets - 10 reps - 5 sec hold  - Seated Hip Abduction with Resistance  - 1 x daily - 4 x weekly - 1 sets - 10 reps - 5 sec hold      Access Code: [email or web address]  URL: [email or web address]  Date: 01/30/2024  Prepared by: Emani Pedro    Exercises  - Supine Lower Trunk Rotation with PLB  - 1 x daily - 4-5 x weekly - 1 sets - 10 reps - 5 sec hold  - Supine Sciatic Nerve Glide  - 1 x daily - 4-5 x weekly - 1 sets - 10-15 reps  - Hooklying Single Knee to Chest Stretch  - 1 x daily - 3 x weekly - 3 sets - 30 sec hold  - Clamshell  - 1 x daily - 3 x weekly - 1 sets - 10-15 reps    Charges: Therex x 3       Total Timed Treatment: 38 min  Total Treatment Time: 38 min

## 2024-01-30 NOTE — PATIENT INSTRUCTIONS
Access Code: [email or web address]  URL: [email or web address]  Date: 01/30/2024  Prepared by: Emani Pedro    Exercises  - Supine Lower Trunk Rotation with PLB  - 1 x daily - 4-5 x weekly - 1 sets - 10 reps - 5 sec hold  - Supine Sciatic Nerve Glide  - 1 x daily - 4-5 x weekly - 1 sets - 10-15 reps  - Hooklying Single Knee to Chest Stretch  - 1 x daily - 3 x weekly - 3 sets - 30 sec hold  - Clamshell  - 1 x daily - 3 x weekly - 1 sets - 10-15 reps

## 2024-01-31 DIAGNOSIS — I10 ESSENTIAL HYPERTENSION: ICD-10-CM

## 2024-01-31 DIAGNOSIS — R73.03 PREDIABETES: ICD-10-CM

## 2024-01-31 RX ORDER — LISINOPRIL AND HYDROCHLOROTHIAZIDE 12.5; 1 MG/1; MG/1
1 TABLET ORAL DAILY
Qty: 90 TABLET | Refills: 0 | Status: SHIPPED | OUTPATIENT
Start: 2024-01-31

## 2024-01-31 RX ORDER — METFORMIN HYDROCHLORIDE 500 MG/1
500 TABLET, EXTENDED RELEASE ORAL 2 TIMES DAILY WITH MEALS
Qty: 180 TABLET | Refills: 0 | Status: SHIPPED | OUTPATIENT
Start: 2024-01-31

## 2024-01-31 NOTE — TELEPHONE ENCOUNTER
Diabetic Medication Protocol [id number]/31/2024 09:32 AM   Protocol Details HgBA1C procedure resulted in past 6 months    Last HgBA1C < 7.5    Microalbumin procedure in past 12 months or taking ACE/ARB    Appointment in past 6 or next 3 months        LOV 12/15/23 DR MCMILLAN     LAST LAB  11/13/23     LAST RX  8/30/23 180     Next OV   Future Appointments   Date Time Provider Department Center   2/2/2024 10:15 AM  [facility]T [facility]   2/5/2024 11:20 AM Inocencio Lucas MD [facility]   2/6/2024  2:15 PM Emani Pedro, PT PF PT [place]   4/4/2024  9:20 AM Cristina Farmer, MOI [facility] [address]   4/30/2024  1:00 PM Candy Joshi RD [facility]   7/1/2024  3:30 PM Lupe Barnett APRN S[facility]         PROTOCOL pass

## 2024-01-31 NOTE — TELEPHONE ENCOUNTER
Pt's  called to ck status of Refill Requests for both metformin and lisinopril said he is going out of town tomorrow asking for refills to be sent asap, pt's  states he previously picked up both Rx's and accidentally threw them away

## 2024-01-31 NOTE — TELEPHONE ENCOUNTER
Hypertension Medications Protocol [id number]/31/2024 09:16 AM   Protocol Details CMP or BMP in past 12 months    Last serum creatinine< 2.0    Appointment in past 6 or next 3 months        LOV 12/15/23 dr woodward     LAST LAB  11/13/23     LAST RX  1/13/23 90 with 2     Next OV   Future Appointments   Date Time Provider Department Center   2/2/2024 10:15 AM EH MR RM4 (3T WIDE) [facility]   2/5/2024 11:20 AM Inocencio Lucas MD [facility]   2/6/2024  2:15 PM Emani Pedro, PT PF PT [place]   4/4/2024  9:20 AM Cristina Farmer PA-C [facility] [address]   4/30/2024  1:00 PM Candy Joshi RD [facility]   7/1/2024  3:30 PM Lupe Barnett APRN [facility]         PROTOCOL pass

## 2024-02-01 ENCOUNTER — APPOINTMENT (OUTPATIENT)
Dept: GENERAL RADIOLOGY | Facility: HOSPITAL | Age: 64
End: 2024-02-01
Attending: ANESTHESIOLOGY
Payer: COMMERCIAL

## 2024-02-01 ENCOUNTER — HOSPITAL ENCOUNTER (OUTPATIENT)
Facility: HOSPITAL | Age: 64
Setting detail: HOSPITAL OUTPATIENT SURGERY
Discharge: HOME | End: 2024-02-01
Attending: ANESTHESIOLOGY | Admitting: ANESTHESIOLOGY
Payer: COMMERCIAL

## 2024-02-01 VITALS
SYSTOLIC BLOOD PRESSURE: 126 MMHG | HEART RATE: 80 BPM | BODY MASS INDEX: 34.96 KG/M2 | HEIGHT: 61.75 IN | WEIGHT: 190 LBS | RESPIRATION RATE: 16 BRPM | DIASTOLIC BLOOD PRESSURE: 64 MMHG | OXYGEN SATURATION: 97 % | TEMPERATURE: 98 F

## 2024-02-01 LAB — GLUCOSE BLD-MCNC: 121 MG/DL (ref 70–99)

## 2024-02-01 PROCEDURE — 3E0T33Z INTRODUCTION OF ANTI-INFLAMMATORY INTO PERIPHERAL NERVES AND PLEXI, PERCUTANEOUS APPROACH: ICD-10-PCS | Performed by: ANESTHESIOLOGY

## 2024-02-01 PROCEDURE — 64454 NJX AA&/STRD GNCLR NRV BRNCH: CPT | Performed by: ANESTHESIOLOGY

## 2024-02-01 PROCEDURE — 3E0T3BZ INTRODUCTION OF ANESTHETIC AGENT INTO PERIPHERAL NERVES AND PLEXI, PERCUTANEOUS APPROACH: ICD-10-PCS | Performed by: ANESTHESIOLOGY

## 2024-02-01 RX ORDER — METHYLPREDNISOLONE ACETATE 40 MG/ML
INJECTION, SUSPENSION INTRA-ARTICULAR; INTRALESIONAL; INTRAMUSCULAR; SOFT TISSUE
Status: DISCONTINUED | OUTPATIENT
Start: 2024-02-01 | End: 2024-02-01

## 2024-02-01 RX ORDER — BUPIVACAINE HYDROCHLORIDE 5 MG/ML
INJECTION, SOLUTION EPIDURAL; INTRACAUDAL
Status: DISCONTINUED | OUTPATIENT
Start: 2024-02-01 | End: 2024-02-01

## 2024-02-01 RX ORDER — LIDOCAINE HYDROCHLORIDE 10 MG/ML
INJECTION, SOLUTION EPIDURAL; INFILTRATION; INTRACAUDAL; PERINEURAL
Status: DISCONTINUED | OUTPATIENT
Start: 2024-02-01 | End: 2024-02-01

## 2024-02-01 NOTE — H&P
History & Physical Examination    Patient Name: Fernanda Starkey  MRN: [id number]  [facility]: [id number]  YOB: 1960    Pre-Operative Diagnosis:  Chronic pain of left knee [M25.562, G89.29]    Present Illness: With knee pain for which genicular nerve block    Medications Prior to Admission   Medication Sig Dispense Refill Last Dose    METFORMIN  MG Oral Tablet 24 Hr TAKE 1 TABLET BY MOUTH 2 TIMES A DAY WITH MEALS 180 tablet 0 2024    LISINOPRIL-HYDROCHLOROTHIAZIDE 10-12.5 MG Oral Tab TAKE 1 TABLET BY MOUTH EVERY DAY 90 tablet 0 2024    Ferrous Sulfate 325 (65 Fe) MG Oral Tab Take 1 tablet (325 mg total) by mouth daily with breakfast. 90 tablet 1 2024    loratadine 10 MG Oral Tab Take 1 tablet (10 mg total) by mouth daily.   2024    ASPIRIN 81 OR Take by mouth daily.   2024    tiZANidine 4 MG Oral Tab 1 tablet p.o. nightly for 10 days, then nightly as needed 30 tablet 1     [] predniSONE 20 MG Oral Tab Take 1 tablet (20 mg total) by mouth daily for 5 days. Take with food 5 tablet 0     BIOTIN OR Take 3,000 mcg by mouth daily.       omeprazole 20 MG Oral Capsule Delayed Release Take 1 capsule (20 mg total) by mouth every morning before breakfast. 90 capsule 2     rosuvastatin 5 MG Oral Tab TAKE 1 TABLET BY MOUTH EVERY NIGHT 90 tablet 1     meclizine 12.5 MG Oral Tab 1 p.o. twice daily for 5 days, then twice daily as needed 30 tablet 0     zolpidem 10 MG Oral Tab Take 1 tablet (10 mg total) by mouth nightly as needed for Sleep. 30 tablet 2     Cholecalciferol (VITAMIN D3 OR) Take 5,000 Units by mouth daily.       diazePAM (VALIUM) 5 MG Oral Tab Take 1 tablet (5 mg total) by mouth nightly as needed. 30 tablet 1     Multiple Vitamins-Minerals (PRESERVISION AREDS) Oral Tab Take by mouth daily.        No current facility-administered medications for this encounter.       Allergies:   Allergies   Allergen Reactions    Imitrex [Sumatriptan Succinate] NAUSEA ONLY     bleeding     Imitrex [Sumatriptan] HIVES    Nexium [Esomeprazole] ITCHING    Pollen     Radiology Contrast Iodinated Dyes RASH       Past Medical History:   Diagnosis Date    Anxiety     Arthritis     Cataracts, bilateral     Diabetes mellitus (HCC)     Disorder of liver     fatty liver    Esophageal reflux     Flatulence/gas pain/belching     Hearing loss     High blood pressure     High cholesterol     Hyperlipidemia     Leg swelling     Lumbago     Meningioma (HCC)     Migraines     Obesity     Osteoarthritis     Pain in joints     Prediabetes     Seropositive rheumatoid arthritis (HCC)     Sleep apnea     Sprain of neck     Stress     Unspecified essential hypertension     Unspecified essential hypertension     Vertigo     Vitamin D deficiency     Wears glasses     Weight gain      Past Surgical History:   Procedure Laterality Date    CATARACT Bilateral     COLONOSCOPY  2020    Tubular Adenomas, recheck 5 years    COLONOSCOPY  10/02/2023    Internal Hemorrhoids. recheck 5 years    CONTRACEPT IUD      REMOVED    CYST ASPIRATION RIGHT      Breast Lesion - benign cyst    EGD  10/02/2023    Normal, Path benign    KNEE REPLACEMENT SURGERY Left 2020          OTHER      boil resection from inner thighs due to virus    OTHER SURGICAL HISTORY      groin abscess bilaterally     Family History   Problem Relation Age of Onset    Other (Other) Father         tuberculosis    Other (Coronary artery disease) Father     Other (TB) Father     Dementia Mother     Hypertension Mother     Breast Cancer Sister 40        estimated age    Other (Coronary artery disease) Brother     No Known Problems Daughter     No Known Problems Son     No Known Problems Son     No Known Problems Son     Diabetes Sister     Arthritis Sister         TKA bilaterally    Diabetes Brother      Social History     Tobacco Use    Smoking status: Never     Passive exposure: Never    Smokeless tobacco: Never   Substance Use Topics    Alcohol use: No      Alcohol/week: 0.0 standard drinks of alcohol       SYSTEM Check if Review is Normal Check if Physical Exam is Normal If not normal, please explain:   HEENT [x ] [x ]    NECK & BACK [x ] [x ]    HEART [x ] [x ]    LUNGS [x ] [x ]    ABDOMEN [x ] [x ]    UROGENITAL [x ] [x ]    EXTREMITIES [x ] [x ]    OTHER        [ x ] I have discussed the risks and benefits and alternatives with the patient/family.  They understand and agree to proceed with plan of care.  [ x ] I have reviewed the History and Physical done within the last 30 days.  Any changes noted above.    Ramy Ramos MD

## 2024-02-01 NOTE — DISCHARGE INSTRUCTIONS
Home Care Instructions Following Your Pain Procedure     Fernanda,  It has been a pleasure to have you as our patient. To help you at home, you must follow these general discharge instructions. We will review these with you before you are discharged. It is our hope that you have a complete and uneventful recovery from our procedure.     General Instructions:  What to Expect:  Bandages from your procedure today can be removed when you get home.  Please avoid soaking and/or swimming for 24 hours.  Showering is okay  It is normal to have increased pain symptoms and/or pain at injection site for up to 3-5 days after procedure, you can use heat or ice (20 minutes on 20 minutes off) for comfort.  You may experience some temporary side effects which may include restlessness or insomnia, flushing of the face, or heart palpitations.  Please contact the provider if these symptoms do not resolve within 3-4 days.  Lightheadedness or nausea may occur and should resolve within 24 to 48 hours.  If you develop a headache after treatment, rest, drink fluids (with caffeine, if possible) and take mild over-the-counter pain medication.  If the headache does not improve with the above treatment, contact the physician.  Home Medications:  Resume all previously prescribed medication.  Please avoid taking NSAIDs (Non-Steriodal Anti-Inflammatory Drugs) such as:  Ibuprofen ( Advil, Motrin) Aleve (Naproxen), Diclofenac, Meloxicam for 6 hours after procedure.   If you are on Coumadin (Warfarin) or any other anti-coagulant (or \"blood thinning\") medication such as Plavix (Clopidogrel), Xarelto (Rivaroxaban), Eliquis (Apixaban), Effient (Prasugrel) etc., restart on the following day from the procedure unless otherwise directed by your provider.  If you are a diabetic, please increase the frequency of your glucose monitoring after the procedure as steroids may cause a temporary (2-3 day) increase in your blood sugar.  Contact your primary care  physician if your blood sugar remains elevated as you may require some medication adjustment.  Diet:  Resume your regular diet as tolerated.  Activity:  We recommend that you relax and rest during the rest of your procedure day.  If you feel weakness in your arms or legs do not drive.  Follow-up Appointment  Please schedule a follow-up visit within 3 to 4 weeks after your last procedure date.  Question or Concerns:  Feel free to call our office with any questions or concerns at [phone] (option #2)    Fernanda  Thank you for coming to [facility] for your procedure.  The nurses try very hard to make sure you receive the best care possible.  Your trust in them as well as us is greatly appreciated.    Thanks so much,   Dr. Ramy Ramos

## 2024-02-01 NOTE — OPERATIVE REPORT
[facility]  Operative Report  2024     Fernanda Starkey Patient Status:  Hospital Outpatient Surgery    12/10/1960 MRN [id number]   Location [facility] Attending Ramy Ramos MD   Hosp Day # 0 PCP Inocencio Lucas MD     Indication: Fernanda is a 63 year old female with chronic knee pain    Preoperative Diagnosis:  Chronic pain of left knee [M25.562, G89.29]    Postoperative Diagnosis: Same as above.    Procedure performed: LEFT GENICULAR NERVE BLOCK with local    Anesthesia: Local      EBL: Less than 1 ml.    Procedure Description:  After reviewing the patient's history and performing a focused physical examination, the diagnosis and positive previous diagnostic tests were confirmed and contraindications such as infection and coagulopathy were ruled out.  Following review of allergies and potential side effects and complications, including but not necessarily limited to infection, allergic reaction, local tissue breakdown, nerve injury,  and paresis, the patient consented for the procedure.    The patient was brought to the procedure room in the supine position.   The operative knee was then identified and prepped and draped in the usual sterile fashion.  AP imaging was used to identify the path of the superior medial, superior lateral, inferior medial genicular nerves.  The overlying soft tissue was then anesthetized with 3 cc 1% lidocaine at each location.  Subsequently, a 3.5 inch 22-gauge spinal needle was advanced imaging guidance to contact bone along the path of the superior medial, superior lateral, and inferior medial genicular nerves.  Needle position was confirmed on both AP and lateral views.  After negative aspiration for heme, total mixture of 40 mg of Depo-Medrol with 9 cc of 0.5% bupivacaine was then evenly divided amongst the 3 sites.  The needle was then flushed with 1 cc 1% lidocaine, re-styletted and removed with tip intact.   The patient tolerated  the procedure very well without significant immediate complication.          Complications: None.    Follow up: The patient was followed in the pain clinic as needed basis.          Ramy Ramos MD

## 2024-02-02 ENCOUNTER — HOSPITAL ENCOUNTER (OUTPATIENT)
Dept: MRI IMAGING | Facility: HOSPITAL | Age: 64
Discharge: HOME | End: 2024-02-02
Attending: ANESTHESIOLOGY
Payer: COMMERCIAL

## 2024-02-02 ENCOUNTER — TELEPHONE (OUTPATIENT)
Dept: PAIN CLINIC | Facility: CLINIC | Age: 64
End: 2024-02-02

## 2024-02-02 DIAGNOSIS — M54.16 LUMBAR RADICULOPATHY: ICD-10-CM

## 2024-02-02 PROCEDURE — 72148 MRI LUMBAR SPINE W/O DYE: CPT | Performed by: ANESTHESIOLOGY

## 2024-02-02 NOTE — TELEPHONE ENCOUNTER
Alex called placed to patient for post procedure follow up. Patient stated she is doing well, no questions or concerns. Informed patient that soreness is to be expected after the procedure. Educated patient that it takes 3-5 days for the steroid to be effective and to allow adequate time for medication to work. Encouraged patient to alternate ice and heat and to take medications as prescribed. Pt verbalized understanding to call with any questions or concerns.      Procedure:   LEFT GENICULAR NERVE BLOCK with local     Date: 2/1/24  Follow up Visit Scheduled: 2/23/24

## 2024-02-05 ENCOUNTER — OFFICE VISIT (OUTPATIENT)
Dept: FAMILY MEDICINE CLINIC | Facility: CLINIC | Age: 64
End: 2024-02-05
Payer: COMMERCIAL

## 2024-02-05 VITALS
BODY MASS INDEX: 35.15 KG/M2 | RESPIRATION RATE: 16 BRPM | OXYGEN SATURATION: 96 % | HEART RATE: 86 BPM | HEIGHT: 61.75 IN | SYSTOLIC BLOOD PRESSURE: 134 MMHG | WEIGHT: 191 LBS | DIASTOLIC BLOOD PRESSURE: 72 MMHG | TEMPERATURE: 97 F

## 2024-02-05 DIAGNOSIS — I10 ESSENTIAL HYPERTENSION: ICD-10-CM

## 2024-02-05 DIAGNOSIS — E78.2 MIXED HYPERLIPIDEMIA: ICD-10-CM

## 2024-02-05 DIAGNOSIS — M17.0 BILATERAL PRIMARY OSTEOARTHRITIS OF KNEE: ICD-10-CM

## 2024-02-05 DIAGNOSIS — D64.9 NORMOCYTIC ANEMIA: ICD-10-CM

## 2024-02-05 DIAGNOSIS — K21.9 GERD WITHOUT ESOPHAGITIS: ICD-10-CM

## 2024-02-05 DIAGNOSIS — E27.8 ADRENAL NODULE (HCC): ICD-10-CM

## 2024-02-05 DIAGNOSIS — E66.9 OBESITY (BMI 30-39.9): ICD-10-CM

## 2024-02-05 DIAGNOSIS — Z51.81 ENCOUNTER FOR MEDICATION MONITORING: ICD-10-CM

## 2024-02-05 DIAGNOSIS — E11.9 CONTROLLED TYPE 2 DIABETES MELLITUS WITHOUT COMPLICATION, WITHOUT LONG-TERM CURRENT USE OF INSULIN (HCC): ICD-10-CM

## 2024-02-05 DIAGNOSIS — M62.838 CERVICAL PARASPINAL MUSCLE SPASM: ICD-10-CM

## 2024-02-05 DIAGNOSIS — M47.26 OSTEOARTHRITIS OF SPINE WITH RADICULOPATHY, LUMBAR REGION: Primary | ICD-10-CM

## 2024-02-05 PROCEDURE — 3008F BODY MASS INDEX DOCD: CPT | Performed by: FAMILY MEDICINE

## 2024-02-05 PROCEDURE — 99214 OFFICE O/P EST MOD 30 MIN: CPT | Performed by: FAMILY MEDICINE

## 2024-02-05 PROCEDURE — 3078F DIAST BP <80 MM HG: CPT | Performed by: FAMILY MEDICINE

## 2024-02-05 PROCEDURE — 3075F SYST BP GE 130 - 139MM HG: CPT | Performed by: FAMILY MEDICINE

## 2024-02-05 RX ORDER — FERROUS SULFATE 325(65) MG
325 TABLET ORAL
Qty: 90 TABLET | Refills: 2 | Status: SHIPPED | OUTPATIENT
Start: 2024-02-05

## 2024-02-05 RX ORDER — ROSUVASTATIN CALCIUM 5 MG/1
5 TABLET, COATED ORAL NIGHTLY
Qty: 90 TABLET | Refills: 2 | Status: SHIPPED | OUTPATIENT
Start: 2024-02-05 | End: 2024-02-07

## 2024-02-05 NOTE — PROGRESS NOTES
Fernanda Starkey is a 63 year old female.  HPI:  Having pain flare in R lower back pain radiating to R foot and burning/tingling in soles of feet including toes.  No improvement with Prednisone course prescribed after last ov  Tizanidine helped a little but caused fatigue  Mild L lower back pain but no radaiton to LLE  Some limitations in activity due to back pain.   Taking Tylenol Arthritis 1 tablet bid/prn  In midst of PT  Having some neck pain as well for a couple of weeks. No radiation to UEs., no know injury.   Had injection in L knee (nerve block), cont to have pain but  much improved. no sig pain now with Flexion that she had and no sig pain with walking now.   Some soreness and tenderness persists.   Chronic R knee pain.   Saw Ortho specialist.  [place] 2 [place].    Saw dietitican.  Eating healthier.   Doing some cycling for exercise.   Brings in med bottles for review    Current Outpatient Medications   Medication Sig Dispense Refill    Ferrous Sulfate 325 (65 Fe) MG Oral Tab Take 1 tablet (325 mg total) by mouth daily with breakfast. 90 tablet 2    METFORMIN  MG Oral Tablet 24 Hr TAKE 1 TABLET BY MOUTH 2 TIMES A DAY WITH MEALS 180 tablet 0    LISINOPRIL-HYDROCHLOROTHIAZIDE 10-12.5 MG Oral Tab TAKE 1 TABLET BY MOUTH EVERY DAY 90 tablet 0    omeprazole 20 MG Oral Capsule Delayed Release Take 1 capsule (20 mg total) by mouth every morning before breakfast. 90 capsule 2    meclizine 12.5 MG Oral Tab 1 p.o. twice daily for 5 days, then twice daily as needed (Patient taking differently: 2 (two) times daily as needed. As needed for dizziness) 30 tablet 0    zolpidem 10 MG Oral Tab Take 1 tablet (10 mg total) by mouth nightly as needed for Sleep. 30 tablet 2    Cholecalciferol (VITAMIN D3 OR) Take 5,000 Units by mouth daily.      diazePAM (VALIUM) 5 MG Oral Tab Take 1 tablet (5 mg total) by mouth nightly as needed. 30 tablet 1    Multiple Vitamins-Minerals (PRESERVISION AREDS)  Oral Tab Take by mouth daily.      loratadine 10 MG Oral Tab Take 1 tablet (10 mg total) by mouth daily.      ASPIRIN 81 OR Take by mouth daily.      rosuvastatin 5 MG Oral Tab TAKE 1 TABLET BY MOUTH NIGHTLY 90 tablet 2         Past Medical History:   Diagnosis Date    Anxiety     Arthritis     Cataracts, bilateral     Diabetes mellitus (HCC)     Disorder of liver     fatty liver    Esophageal reflux     Flatulence/gas pain/belching     Hearing loss     High blood pressure     High cholesterol     Hyperlipidemia     Leg swelling     Lumbago     Meningioma (HCC)     Migraines     Obesity     Osteoarthritis     Pain in joints     Prediabetes     Seropositive rheumatoid arthritis (HCC)     Sleep apnea     Sprain of neck     Stress     Unspecified essential hypertension     Unspecified essential hypertension     Vertigo     Vitamin D deficiency     Wears glasses     Weight gain        Past Surgical History:   Procedure Laterality Date    CATARACT Bilateral     COLONOSCOPY  2020    Tubular Adenomas, recheck 5 years    COLONOSCOPY  10/02/2023    Internal Hemorrhoids. recheck 5 years    CONTRACEPT IUD      REMOVED    CYST ASPIRATION RIGHT      Breast Lesion - benign cyst    EGD  10/02/2023    Normal, Path benign    KNEE REPLACEMENT SURGERY Left 2020          OTHER      boil resection from inner thighs due to virus    OTHER SURGICAL HISTORY      groin abscess bilaterally         Social History     Socioeconomic History    Marital status:    Tobacco Use    Smoking status: Never     Passive exposure: Never    Smokeless tobacco: Never   Vaping Use    Vaping Use: Never used   Substance and Sexual Activity    Alcohol use: No     Alcohol/week: 0.0 standard drinks of alcohol    Drug use: No   Other Topics Concern    Caffeine Concern Yes     Comment: 2 cups daily    Exercise Yes     Comment: 1x week   Social History Narrative    ** Merged History Encounter **                        REVIEW OF  SYSTEMS:  GENERAL HEALTH: feels well otherwise, mood is good  SKIN: denies any unusual skin lesions or rashes  RESPIRATORY: denies shortness of breath with exertion, no cough  CARDIOVASCULAR: denies chest pain on exertion  GI: denies abdominal pain and denies heartburn,, GERD sxs controlled with medication  : normal bladder  NEURO: denies headaches, denies dizziness    EXAM:  /72   Pulse 86   Temp 97.1 °F (36.2 °C) (Temporal)   Resp 16   Ht 5' 1.75\" (1.568 m)   Wt 191 lb (86.6 kg)   SpO2 96%   BMI 35.22 kg/m²   Wt Readings from Last 6 Encounters:   02/26/24 191 lb (86.6 kg)   02/05/24 191 lb (86.6 kg)   01/31/24 190 lb (86.2 kg)   01/29/24 190 lb (86.2 kg)   01/10/24 189 lb 9.6 oz (86 kg)   12/28/23 189 lb 9.6 oz (86 kg)     GENERAL: well developed, well nourished,in no apparent distress, pleasant affect  SKIN: no rashes,no suspicious lesions  HEENT: atraumatic, normocephalic,  NECK: supple,no adenopathy,noTM  No spinal tenderness, bilat paracervical tenderness, good ROM  LUNGS: clear to auscultation  CARDIO: RRR without murmur  GI: NABS, soft, obese, no tenderness, no masses, no HSM, ND  EXTREMITIES: no cyanosis, clubbing or edema  Bilat knees with medial jt line tenderness, some chronic appearing swelling L.good ROM.   Back: No spinal tenderness. Right paralumbar tenderness/spasm. Limited flexion. Extension okay. Positive right straight leg raise . MS 5/5, no numbness/tingllng  NEURO: A&O x3, no focal deficits  Antalgic gait    ASSESSMENT AND PLAN:    1. Osteoarthritis of spine with radiculopathy, lumbar region  Reviewed back maintenance  Oral steroid course did not help  Had recent MRI of Lumbar spine with pain specialist. Advanced facet arthropathy. No sig spinal stenosis.   F/u with pain specialist  Tizanidine caused sedation.  Tylenol Arthritis prn, advised to increase to 2 tablets po bid-tid prn  Topical analgesics prn.   Unable to tiana NSAIDs due to GI SEs.   Cont PT  HEP    2. Cervical  paraspinal muscle spasm  Pain meds as above  HEP  Local ice/heat, alternate    3. Bilateral primary osteoarthritis of knee  Saw Ortho and pain specialist.  H/o LTKR. Noted geniculate nerve block helped.  Chronic R knee pain, not interested in surgery .   HEP. Pain control as above.     4. Controlled type 2 diabetes mellitus without complication, without long-term current use of insulin (HCC)  Reviewed diet/exercise. Last A1C 6.8  Cont current dose of Metformin ER  - Hemoglobin A1C; Future  - Comp Metabolic Panel (14); Future    5. Essential hypertension  BP controlled.   - CBC With Differential With Platelet; Future  - Comp Metabolic Panel (14); Future    6. Mixed hyperlipidemia  Reviewed diet /exercise.  Continue statin.  Monitor lipids and LFTs.  - Lipid Panel; Future  - Comp Metabolic Panel (14); Future    7. Normocytic anemia  Hemoglobin stable.  Has had GI workup including capsule endoscopy  Continue iron supplement.  .- Ferrous Sulfate 325 (65 Fe) MG Oral Tab; Take 1 tablet (325 mg total) by mouth daily with breakfast.  Dispense: 90 tablet; Refill: 2  - CBC With Differential With Platelet; Future    8. Adrenal nodule (HCC)  Noted recent MRI of lumbar spine with incidental finding of 2.3 cm L adrenal nodule.  Pt had MRI of liver 10/2023 with L adrenal myelolipoma 1.7cm size  May need further imaging/eval  Will rec further testing after pt returns from travel.     9. GERD without esophagitis  Doing well with Omeprazole daily.  Avoid poss dietary triggers.  EGD with benign findings.     10.  Obesity.    Reviewed diet and exercise.  Has appointment scheduled with weight loss clinic.    11.  Encounter for medication monitoring  - CBC With Differential With Platelet; Future  - Hemoglobin A1C; Future  - Comp Metabolic Panel (14); Future  Reviewed all medication in detail with patient.

## 2024-02-06 ENCOUNTER — TELEPHONE (OUTPATIENT)
Dept: FAMILY MEDICINE CLINIC | Facility: CLINIC | Age: 64
End: 2024-02-06

## 2024-02-06 ENCOUNTER — OFFICE VISIT (OUTPATIENT)
Dept: PHYSICAL THERAPY | Age: 64
End: 2024-02-06
Attending: FAMILY MEDICINE
Payer: COMMERCIAL

## 2024-02-06 DIAGNOSIS — M25.562 CHRONIC PAIN OF LEFT KNEE: Primary | ICD-10-CM

## 2024-02-06 DIAGNOSIS — M47.816 FACET ARTHROPATHY, LUMBAR: ICD-10-CM

## 2024-02-06 DIAGNOSIS — G89.29 CHRONIC PAIN OF LEFT KNEE: Primary | ICD-10-CM

## 2024-02-06 DIAGNOSIS — M54.16 LUMBAR RADICULOPATHY: ICD-10-CM

## 2024-02-06 DIAGNOSIS — E78.2 MIXED HYPERLIPIDEMIA: ICD-10-CM

## 2024-02-06 PROCEDURE — 97110 THERAPEUTIC EXERCISES: CPT

## 2024-02-06 RX ORDER — ROSUVASTATIN CALCIUM 5 MG/1
5 TABLET, COATED ORAL NIGHTLY
Qty: 90 TABLET | Refills: 3 | OUTPATIENT
Start: 2024-02-06

## 2024-02-06 NOTE — TELEPHONE ENCOUNTER
LOV 2/5/24    Future Appointments   Date Time Provider Department Center   2/26/2024  1:30 PM Ramy Ramos MD [facility]   4/23/2024 11:20 AM Inocencio Lucas MD [facility]       Pain referral pended for your approval if ok.  Please review and advise. Thank you.

## 2024-02-06 NOTE — PROGRESS NOTES
Diagnosis:   Lumbar radiculopathy (M54.16)  Bilateral primary osteoarthritis of knee (M17.0)      Referring Provider: Shayy  Date of Evaluation:   1/8/2024    Precautions:  Drug Allergy, Hearing impairment, Visual Impairment  Possible language barrier Next MD visit:   none scheduled  Date of Surgery: n/a   Insurance Primary/Secondary: [employer] [place] HMO / N/A     # Auth Visits: 5v auth 1/1-3/31             Discharge Summary  Pt has attended 5 visits in Physical Therapy.     Subjective: Pt reports not feeling a difference yet post-injection. R>L knee pain persists. Improvements are noted in walking & standing, laying down, bending forward, donning socks. Stairs are still a challenge. Standing tolerance approximately 10-15 minutes.    Pain: 8/10 (current)      Objective: See Flowsheet for details  2/6/2024  Observation/Posture: Stance: less pronounced WBOS, increased thoracic kyphosis & decreased lumbar lordosis, B genu varum. Slower with bed mobility & sit to stand.    Palpation: TTP R PSIS with increased tension to B LPSM (though reduced) in stance    Lumbar AROM: (* denotes performed with pain)  Flexion: Mod restricted* & slower    Knee AROM (supine): R 120 degrees flexion with increased discomfort & slower to complete, L 127 degrees flexion    Strength: (* denotes performed with pain)  LE   Hip flexion (L2): R 4-/5*; L 4/5  Hip abduction: R 3-/5; L 3+/5  Knee Flexion: R 4/5; L 4+/5   Knee extension (L3): R 4/5; L 4+/5   DF (L4): R 4/5; L 4+/5     Gait: pt ambulates on level ground with antalgia, difficulty turning, difficulty with initiation, trendelenburg/waddle, and decreased heel strike R>L.    Balance: SLS R 3 sec, L 4 sec      Assessment: Pt has completed 5 visits of skilled PT. Continues with symptoms, though improvements are noted both objectively & subjectively. Pt has been advised to follow up with MD for re-assessment due to ongoing symptoms. Reviewed HEP, with emphasis to refrain if any exercise is increasing  symptoms/ pain. Pt advised to contact PT if questions/ concerns arise while performing HEP. Pt advised to follow up with referring provider and/ or PCP if symptoms increase despite adherence to HEP. Pt is aware that if symptoms recur or increase, pt may be appropriate to return to skilled PT under new POC w/ updated RX if deemed medically necessary. Pt is in agreement with discharge plans. Thank you.      Goals: (to be met in 8 visits)  Pt will demonstrate good understanding of proper posture and body mechanics to decrease pain and improve spinal safety - improved  Pt will improve lumbar spine AROM flexion to Min restricted to allow increase ease with bending forward to don shoes/ pick items off floor - improved  Pt will improve B hip abduction strength to 4-/5 for improved stability with standing & walking - improved  Pt will improve R knee flexion strength to 4/5 for improved ability to complete stair ascent - improved  Pt will improve R knee extension strength to 4+/5 for improved ability to complete sit to stand transfers - improved  Pt will improve R knee flexion AROM to 110 degrees for improved ability to don socks - met  Pt will have decreased lumbar paraspinal mm tension to tolerate standing >30 minutes for home activities - improved  Pt will improve SLS to at least 10s R/L to improve safety with gait on uneven surfaces such as grass and gravel - improved  Pt will be independent and compliant with comprehensive HEP to maintain progress achieved in PT - met    Plan: Discharge from current PT POC.    Patient/Family/Caregiver was advised of these findings, precautions, and treatment options and has agreed to actively participate in planning and for this course of care.    Thank you for your referral. If you have any questions, please contact me at Dept: [phone].    Sincerely,  Electronically signed by therapist: Emani Pedro, PT     Physician's certification required:  No  Please co-sign or sign and return  this letter via fax as soon as possible to [phone].   I certify the need for these services furnished under this plan of treatment and while under my care.    X___________________________________________________ Date____________________    Certification From: 2/6/2024  To:5/6/2024     Date: 1/16/2024  TX#: 2/8 Date: 1/22/2024  TX#: 3/8 Date: 1/30/2024  TX#: 4/8 Date: 2/6/2024  TX#: 5/8  Discharge   Therex: 39'  NuStep L2 x 4'  Hooklying hip Add Jorge with yellow ball squeeze, 5\" x 10  Hooklying hip Abd Jorge with Green TB 5\" x 10  Small range LTR x 5 ea (10x total)  SB DKTC x 3 reps: defer 2/2 increased pain  SKTC stretch 1x20\" ea  SAQ over wedge x 10 ea  PPT 2x5  Seated lumbar flexion rolling with SB x 10  Education: potential of normal soreness from new ex's today Therex: 42'  NuStep L2->L1 x 6'  -started at level 2, down to level 1 for most of time - better tolerance  -increased seat to further distance away - better tolerance  SKTC stretch 2x20\" ea  LTR 5\" x 10 ea  Modified helene stretch off side of table x 1.5' ea  Ankle pump in active HS stretch position 2x10 L, 1x10 R  -use of sheet to hold on to for ease of performing  Seated PROM limited range flex<>ext with grade 1 tibial IR with flexion 2x10  Seated R HS curls (small range) YTB x 10  Seated lumbar flexion rolling with SB x 10  Educate: ice vs heat. Do not apply directly to skin, max of 10 min. Therex: 38'  NuStep L1 x 4' - defer add'l time 2/2 increased ache R thigh  Seated lumbar flexion rolling with SB 2x10  LTR 5\" x 10 ea  Ankle pump in active HS stretch position x 15 ea  -use of sheet to hold on to for ease of performing  Clams x 15 ea  SKTC stretch 2x30\"  -use of sheet to hold on to for ease of performing  HEP update: education to defer if increased pain noted  Discharge Planning Therex: 45'  Discharge Instructions  Re-assessment, goal status completion  HEP review: pt should refrain from pushing through pain - stop exercise if pain is  increasing  LTR 5\" x 10 ea  SKTC stretch 3x30\" ea with use of long towel to hold on to for ease of performing  Ankle pump in active HS stretch position x 20 ea with use of long towel to hold on to for ease of performing  NuStep L1 x 6'  Seated lumbar flexion rolling with SB 2x10   HEP:   Access Code: [email or web address]  URL: [email or web address]  Date: 01/08/2024  Prepared by: Emani Pedro    Program Notes  These exercises should not create increased pain. If you have increased pain, either modify if you can, or stop the exercise if unable to eliminate pain. Muscle soreness is likely & normal from the new strengthening you are doing which is different than pain.    Exercises  - Seated Hip Adduction Isometrics with Ball  - 1 x daily - 4 x weekly - 1 sets - 10 reps - 5 sec hold  - Seated Hip Abduction with Resistance  - 1 x daily - 4 x weekly - 1 sets - 10 reps - 5 sec hold      Access Code: [email or web address]  URL: [email or web address]  Date: 01/30/2024  Prepared by: Emani Pedro    Exercises  - Supine Lower Trunk Rotation with PLB  - 1 x daily - 4-5 x weekly - 1 sets - 10 reps - 5 sec hold  - Supine Sciatic Nerve Glide  - 1 x daily - 4-5 x weekly - 1 sets - 10-15 reps  - Hooklying Single Knee to Chest Stretch  - 1 x daily - 3 x weekly - 3 sets - 30 sec hold  - Clamshell  - 1 x daily - 3 x weekly - 1 sets - 10-15 reps    Charges: Therex x 3       Total Timed Treatment: 45 min  Total Treatment Time: 45 min

## 2024-02-07 RX ORDER — ROSUVASTATIN CALCIUM 5 MG/1
5 TABLET, COATED ORAL NIGHTLY
Qty: 90 TABLET | Refills: 2 | Status: SHIPPED | OUTPATIENT
Start: 2024-02-07

## 2024-02-26 ENCOUNTER — OFFICE VISIT (OUTPATIENT)
Dept: PAIN CLINIC | Facility: CLINIC | Age: 64
End: 2024-02-26
Payer: COMMERCIAL

## 2024-02-26 ENCOUNTER — TELEPHONE (OUTPATIENT)
Dept: ADMINISTRATIVE | Age: 64
End: 2024-02-26

## 2024-02-26 VITALS
OXYGEN SATURATION: 97 % | SYSTOLIC BLOOD PRESSURE: 108 MMHG | WEIGHT: 191 LBS | BODY MASS INDEX: 35 KG/M2 | DIASTOLIC BLOOD PRESSURE: 64 MMHG | HEART RATE: 64 BPM

## 2024-02-26 DIAGNOSIS — Z47.89 ORTHOPEDIC AFTERCARE: ICD-10-CM

## 2024-02-26 DIAGNOSIS — M17.11 PRIMARY OSTEOARTHRITIS OF RIGHT KNEE: Primary | ICD-10-CM

## 2024-02-26 NOTE — PROGRESS NOTES
70%Last procedure: LEFT GENICULAR NERVE BLOCK with local   Date: 02/01/24  Percentage of relief obtained:  70%  Duration of relief: current    Current Pain Score: 6

## 2024-02-26 NOTE — PROGRESS NOTES
Name: Fernanda Starkey   : 12/10/1960   DOS: 2024     Pain Clinic Follow Up Visit:     Chief Complaint   Patient presents with    Procedure Follow Up     LEFT GENICULAR NERVE BLOCK with local       Fernanda Starkey is a 63 year old female with a history of prior left total knee arthroplasty and chronic knee pain here for follow-up after genicular nerve block.  Patient reports 75% relief of left knee pain symptoms.  Overall, doing well.    Patient also has a history of osteoarthritis in the right knee.  At this time, she is not interested in moving forward with total knee arthroplasty on the right side.  Did have corticosteroid injections approximately 2 years ago which only led to transient relief.  Want to discuss treatment options.    Pt denies any chills, fever, or weakness. There is no bladder or bowel incontinence associated with the pain.    REVIEW OF SYSTEMS:  A ten point review of systems was performed with pertinent positives and negatives in the HPI.    Allergies   Allergen Reactions    Imitrex [Sumatriptan Succinate] NAUSEA ONLY     bleeding    Imitrex [Sumatriptan] HIVES    Nexium [Esomeprazole] ITCHING    Pollen     Radiology Contrast Iodinated Dyes RASH       Current Outpatient Medications   Medication Sig Dispense Refill    rosuvastatin 5 MG Oral Tab TAKE 1 TABLET BY MOUTH NIGHTLY 90 tablet 2    Ferrous Sulfate 325 (65 Fe) MG Oral Tab Take 1 tablet (325 mg total) by mouth daily with breakfast. 90 tablet 2    METFORMIN  MG Oral Tablet 24 Hr TAKE 1 TABLET BY MOUTH 2 TIMES A DAY WITH MEALS 180 tablet 0    LISINOPRIL-HYDROCHLOROTHIAZIDE 10-12.5 MG Oral Tab TAKE 1 TABLET BY MOUTH EVERY DAY 90 tablet 0    omeprazole 20 MG Oral Capsule Delayed Release Take 1 capsule (20 mg total) by mouth every morning before breakfast. 90 capsule 2    meclizine 12.5 MG Oral Tab 1 p.o. twice daily for 5 days, then twice daily as needed (Patient taking differently: 2 (two) times daily as needed. As  needed for dizziness) 30 tablet 0    zolpidem 10 MG Oral Tab Take 1 tablet (10 mg total) by mouth nightly as needed for Sleep. 30 tablet 2    Cholecalciferol (VITAMIN D3 OR) Take 5,000 Units by mouth daily.      diazePAM (VALIUM) 5 MG Oral Tab Take 1 tablet (5 mg total) by mouth nightly as needed. 30 tablet 1    Multiple Vitamins-Minerals (PRESERVISION AREDS) Oral Tab Take by mouth daily.      loratadine 10 MG Oral Tab Take 1 tablet (10 mg total) by mouth daily.      ASPIRIN 81 OR Take by mouth daily.           EXAM:   /64   Pulse 64   Wt 191 lb (86.6 kg)   SpO2 97%   BMI 35.22 kg/m²   General:  Patient is a(n) 63 year old year old female in no acute distress.  Neurologic:: WNL-Orientation to time, place and person, normal mood & affect, concentration & attention span intact.   Inspection:  Ambulates with well-coordinated, fluid, non-antalgic gait.  Gait is normal.  Neck: Full range of motion  Back: Gait intact  Cranial nerve: Grossly intact  Respiratory: Nonlabored      IMAGES:     Right knee x-ray reviewed with evidence of moderate osteoarthritis mostly along the medial compartment    ASSESSMENT AND PLAN:     1. Primary osteoarthritis of right knee    2. Left total knee arthroplasty  Global 03/18/2021      The patient is a 63-year-old with a history of previous left total knee arthroplasty.  Has chronic knee pain after left TKA.  Is doing well after recent genicular nerve block with 70% improvement.  Today, complains of pain in the right knee.  This is a chronic issue.  Does have evidence of osteoarthritis in the medial aspect of the knee.  Patient has failed corticosteroid injection.  Recommended trial of Synvisc injection.    Orders:  Orders Placed This Encounter   Procedures    [facility] PAIN NAVIGATOR         Radiology orders and consultations:None  The patient indicates understanding of these issues and agrees to the plan.  No follow-ups on file.    Ramy Ramos MD, 2/26/2024, 1:59 PM

## 2024-02-29 ENCOUNTER — TELEPHONE (OUTPATIENT)
Dept: PAIN CLINIC | Facility: CLINIC | Age: 64
End: 2024-02-29

## 2024-02-29 DIAGNOSIS — M17.11 PRIMARY OSTEOARTHRITIS OF RIGHT KNEE: Primary | ICD-10-CM

## 2024-02-29 NOTE — TELEPHONE ENCOUNTER
Spoke with patient's spouse Crispin to schedule injection.     Patient's spouse advised of insurance approval to proceed with injections and is agreeable to scheduling. Patient scheduled for procedure, pre-procedure instructions reviewed. Patient prefers Local sedation. Reviewed sedation instructions including No Fasting and No  Required. Patient's spouse encouraged but not required to hold ASA 81 mg for 24 hours prior to procedure. Patient's spouse verbalized understanding of instructions, no further needs at this time.        [facility] PAIN [facility]IN[facility]  PRE-PROCEDURE INSTRUCTIONS WITHOUT SEDATION    Procedure: Right Knee Synvisc Injection        Appointment Date: 03/07/2024  Check-In Time: 02:00 PM      Prior to the procedure:  Please update us prior to the procedure if you are experiencing any symptoms of infection such as cough, fever, chills, urinary symptoms, or have recently been prescribed antibiotics, have open wounds, have recently had surgery or dental procedures.    Day of Procedure:  **Drivers will be required for patients who receive prescriptions for Valium.    NO FASTING REQUIRED  Please bring your Insurance Card, Photo ID, List of Current Medications and Referral (if applicable) to your appointment.  Please park in the [employer] and follow the signs to the [facility] [place].  Check in at [facility] ([address] [place] [address]) outpatient registration in the [facility] [place].  Please note-No prescriptions will be written by Pain Clinic in [place] on the day of procedure. If you require a refill of medications, please contact the office 48 hours prior to your procedure.  If you have an implanted Spinal Cord or Peripheral Nerve Stimulator: Please remember to turn device off for procedure.        Medication Hold:    Number of days you need to be off for the following medications:    Aggrenox 10 days   Agrylin (Anagrelide) 10 days  Brilinta (Ticagrelor) 7 days  Imbruvica (Ibrutinib) 3  days   Enbrel (Etanercept) 24 hours   Fragmin (Dalteparin) 24 hours   Pletal (Cilostazol) 7 days  Effient (Prasugrel) 7 days  Pradaxa 10 days  Trental 7 days  Eliquis (Apixaban) 3 days  Xarelto (Rivaroxaban) 3 days  Lovenox (Enoxaparin) 24 hours  Aspirin  Greater than 81mg but less than 325mg   5 days  325mg and greater                  7 days  Coumadin       5 days  Procedure may be cancelled if INR is elevated.   Excedrin (with aspirin) 7 days  Plavix (Clopidogrel)                            7 days    NSAIDs: 24 hours preferred      Ibuprofen (Motrin, Advil, Vicoprofen), Naproxen (Naprosyn, Aleve), Piroxcam (Feldene), Meloxicam (Mobic), Oxaprozin (Daypro), Diclofenac (Voltaren), Indomethacin (Indocin), Etodolac (Lodine), Nabumetone (Relafen), Celebrex (Celecoxib)           HERBAL SUPPLEMENTS  5 days preferred  Fish oil, krill oil, Omega-3, Vascepa, Vitamin E, Turmeric, Garlic                       Insurance Authorization:   Most insurances are now requiring a preauthorization for all procedures.  In the event that your insurance does not authorize your procedure within 48 hours of the scheduled date, your procedure will be cancelled and rescheduled to a later date.  Please contact your insurance carrier to determine what your financial responsibility will be for the procedure(s).      Cancellation/Rescheduling Appointment:   In the event you need to cancel or reschedule your appointment, you must notify the office 24 hours prior.    Post-procedure instructions:        Please schedule a follow up visit within 2 to 4 weeks after your last procedure date   Please call our office with any questions or concerns before or after your procedure at  [phone].  If you are a diabetic, please increase the frequency of your glucose monitoring after the procedure as this may cause a temporary increase in your blood sugar.  Contact your primary care physician if your blood sugar rises as you may require some medication  adjustment.  It is normal to have increased pain at injection site for up to 3-5 days after procedure, you can use heat or ice (20 minutes on 20 minutes off) for comfort.    **To hear a recorded version of these instructions, please call [phone] and follow the prompts.  **Para escuchar las instrucciones en Español, por favor de llamar el wilmar [phone] opción 4.

## 2024-02-29 NOTE — TELEPHONE ENCOUNTER
PATIENT NAME:  MELVIN ALMEIDA/ [phone] (home)/ There is no work phone number on file.  PATIENT :  12/10/1960  REFERRAL ID #:  [id number]  REFERRAL STATUS:  Authorized [1]  REVIEW REFERRAL NOTES FOR MORE INFORMATION:  DATE AUTHORIZED:  2024 // EXPIRATION DATE: 2025   REFERRED BY:  ABI FERNANDO MD (TEL: [phone])  REFERRED TO: ABI FERNANDO MD (TEL: [phone])     No vendor specified on referral. / No vendor phone number known.  No facility specified on referral  REFERRED FOR:    Diagnoses:    M17.11 (ICD-10-CM) - 715.16 (ICD-9-CM) - Primary osteoarthritis of right knee  Procedures:     [id number] - Formerly [facility] PAIN NAVIGATOR      - HYALURONAN OR DERIVATIVE, SYNVISC OR SYNVISC-ONE, FOR INTRA-ARTICULAR     30511 (CPT®) - DRAIN/INJECT LARGE JOINT/BURSA     07635 (CPT®) - THER/PROPH/DIAG INJ, SC/IM   NUMBER OF VISITS AUTH: 3

## 2024-02-29 NOTE — TELEPHONE ENCOUNTER
Order Questions    Question Answer   Anesthesia Type Local   Provider Richard   formerly [facility]   CPT (Hit enter after each entry) DRAIN/INJECT LARGE JOINT/BURSA   Medical clearance requested (will send to Pain Navigator) No   Patient has Medicare coverage? No   Comments (Please list entire procedure name here.) right knee injection with synvisc

## 2024-03-07 ENCOUNTER — HOSPITAL ENCOUNTER (OUTPATIENT)
Facility: HOSPITAL | Age: 64
Setting detail: HOSPITAL OUTPATIENT SURGERY
Discharge: HOME | End: 2024-03-07
Attending: ANESTHESIOLOGY | Admitting: ANESTHESIOLOGY
Payer: COMMERCIAL

## 2024-03-07 ENCOUNTER — APPOINTMENT (OUTPATIENT)
Dept: GENERAL RADIOLOGY | Facility: HOSPITAL | Age: 64
End: 2024-03-07
Attending: ANESTHESIOLOGY
Payer: COMMERCIAL

## 2024-03-07 VITALS
BODY MASS INDEX: 35.15 KG/M2 | SYSTOLIC BLOOD PRESSURE: 126 MMHG | DIASTOLIC BLOOD PRESSURE: 60 MMHG | OXYGEN SATURATION: 98 % | HEIGHT: 61.75 IN | TEMPERATURE: 98 F | HEART RATE: 79 BPM | WEIGHT: 191 LBS | RESPIRATION RATE: 16 BRPM

## 2024-03-07 LAB — GLUCOSE BLD-MCNC: 113 MG/DL (ref 70–99)

## 2024-03-07 PROCEDURE — 82962 GLUCOSE BLOOD TEST: CPT

## 2024-03-07 PROCEDURE — 77002 NEEDLE LOCALIZATION BY XRAY: CPT | Performed by: ANESTHESIOLOGY

## 2024-03-07 PROCEDURE — 3E0U3GC INTRODUCTION OF OTHER THERAPEUTIC SUBSTANCE INTO JOINTS, PERCUTANEOUS APPROACH: ICD-10-PCS | Performed by: ANESTHESIOLOGY

## 2024-03-07 RX ORDER — LIDOCAINE HYDROCHLORIDE 10 MG/ML
INJECTION, SOLUTION EPIDURAL; INFILTRATION; INTRACAUDAL; PERINEURAL
Status: DISCONTINUED | OUTPATIENT
Start: 2024-03-07 | End: 2024-03-07

## 2024-03-07 RX ORDER — ONDANSETRON 2 MG/ML
4 INJECTION INTRAMUSCULAR; INTRAVENOUS ONCE AS NEEDED
Status: DISCONTINUED | OUTPATIENT
Start: 2024-03-07 | End: 2024-03-07

## 2024-03-07 RX ORDER — SODIUM CHLORIDE, SODIUM LACTATE, POTASSIUM CHLORIDE, CALCIUM CHLORIDE 600; 310; 30; 20 MG/100ML; MG/100ML; MG/100ML; MG/100ML
100 INJECTION, SOLUTION INTRAVENOUS CONTINUOUS
Status: DISCONTINUED | OUTPATIENT
Start: 2024-03-07 | End: 2024-03-07

## 2024-03-07 RX ORDER — METHYLPREDNISOLONE ACETATE 40 MG/ML
INJECTION, SUSPENSION INTRA-ARTICULAR; INTRALESIONAL; INTRAMUSCULAR; SOFT TISSUE
Status: DISCONTINUED | OUTPATIENT
Start: 2024-03-07 | End: 2024-03-07

## 2024-03-07 NOTE — H&P
History & Physical Examination    Patient Name: Fernanda Starkey  MRN: [id number]  [facility]: [id number]  YOB: 1960    Pre-Operative Diagnosis:  Primary osteoarthritis of right knee [M17.11]    Present Illness: Osteoarthritis knee    Medications Prior to Admission   Medication Sig Dispense Refill Last Dose    ASPIRIN 81 OR Take by mouth daily.   3/7/2024 at 0800    rosuvastatin 5 MG Oral Tab TAKE 1 TABLET BY MOUTH NIGHTLY 90 tablet 2     Ferrous Sulfate 325 (65 Fe) MG Oral Tab Take 1 tablet (325 mg total) by mouth daily with breakfast. 90 tablet 2     METFORMIN  MG Oral Tablet 24 Hr TAKE 1 TABLET BY MOUTH 2 TIMES A DAY WITH MEALS 180 tablet 0     LISINOPRIL-HYDROCHLOROTHIAZIDE 10-12.5 MG Oral Tab TAKE 1 TABLET BY MOUTH EVERY DAY 90 tablet 0     [] predniSONE 20 MG Oral Tab Take 1 tablet (20 mg total) by mouth daily for 5 days. Take with food 5 tablet 0     omeprazole 20 MG Oral Capsule Delayed Release Take 1 capsule (20 mg total) by mouth every morning before breakfast. 90 capsule 2     meclizine 12.5 MG Oral Tab 1 p.o. twice daily for 5 days, then twice daily as needed (Patient taking differently: 2 (two) times daily as needed. As needed for dizziness) 30 tablet 0     zolpidem 10 MG Oral Tab Take 1 tablet (10 mg total) by mouth nightly as needed for Sleep. 30 tablet 2     Cholecalciferol (VITAMIN D3 OR) Take 5,000 Units by mouth daily.       diazePAM (VALIUM) 5 MG Oral Tab Take 1 tablet (5 mg total) by mouth nightly as needed. 30 tablet 1     Multiple Vitamins-Minerals (PRESERVISION AREDS) Oral Tab Take by mouth daily.       loratadine 10 MG Oral Tab Take 1 tablet (10 mg total) by mouth daily.        Current Facility-Administered Medications   Medication Dose Route Frequency    lactated ringers infusion  100 mL/hr Intravenous Continuous    ondansetron (Zofran) 4 MG/2ML injection 4 mg  4 mg Intravenous Once PRN    hylan G-F 20 (Synvisc-One) 48 MG/6ML intra-articular injection 48 mg  6 mL  Intra-articular Once       Allergies:   Allergies   Allergen Reactions    Imitrex [Sumatriptan Succinate] NAUSEA ONLY     bleeding    Imitrex [Sumatriptan] HIVES    Nexium [Esomeprazole] ITCHING    Pollen     Radiology Contrast Iodinated Dyes RASH       Past Medical History:   Diagnosis Date    Anxiety     Arthritis     Cataracts, bilateral     Diabetes mellitus (HCC)     Disorder of liver     fatty liver    Esophageal reflux     Flatulence/gas pain/belching     Hearing loss     High blood pressure     High cholesterol     Hyperlipidemia     Leg swelling     Lumbago     Meningioma (HCC)     Migraines     Obesity     Osteoarthritis     Pain in joints     Prediabetes     Seropositive rheumatoid arthritis (HCC)     Sleep apnea     Sprain of neck     Stress     Unspecified essential hypertension     Unspecified essential hypertension     Vertigo     Vitamin D deficiency     Wears glasses     Weight gain      Past Surgical History:   Procedure Laterality Date    CATARACT Bilateral     COLONOSCOPY  2020    Tubular Adenomas, recheck 5 years    COLONOSCOPY  10/02/2023    Internal Hemorrhoids. recheck 5 years    CONTRACEPT IUD      REMOVED    CYST ASPIRATION RIGHT      Breast Lesion - benign cyst    EGD  10/02/2023    Normal, Path benign    KNEE REPLACEMENT SURGERY Left 2020          OTHER      boil resection from inner thighs due to virus    OTHER SURGICAL HISTORY      groin abscess bilaterally     Family History   Problem Relation Age of Onset    Other (Other) Father         tuberculosis    Other (Coronary artery disease) Father     Other (TB) Father     Dementia Mother     Hypertension Mother     Breast Cancer Sister 40        estimated age    Other (Coronary artery disease) Brother     No Known Problems Daughter     No Known Problems Son     No Known Problems Son     No Known Problems Son     Diabetes Sister     Arthritis Sister         TKA bilaterally    Diabetes Brother      Social History      Tobacco Use    Smoking status: Never     Passive exposure: Never    Smokeless tobacco: Never   Substance Use Topics    Alcohol use: No     Alcohol/week: 0.0 standard drinks of alcohol       SYSTEM Check if Review is Normal Check if Physical Exam is Normal If not normal, please explain:   HEENT [x ] [x ]    NECK & BACK [x ] [x ]    HEART [x ] [x ]    LUNGS [x ] [x ]    ABDOMEN [x ] [x ]    UROGENITAL [x ] [x ]    EXTREMITIES [x ] [x ]    OTHER        [ x ] I have discussed the risks and benefits and alternatives with the patient/family.  They understand and agree to proceed with plan of care.  [ x ] I have reviewed the History and Physical done within the last 30 days.  Any changes noted above.    Ramy Ramos MD

## 2024-03-07 NOTE — OPERATIVE REPORT
[facility]  Operative Report  3/7/2024     Fernanda Starkey Patient Status:  Hospital Outpatient Surgery    12/10/1960 MRN [id number]   Location [facility] PAIN [facility] Attending Ramy Ramos MD   Hosp Day # 0 PCP Inocencio Lucas MD     Indication: Fernanda is a 63 year old female with knee pain    Preoperative Diagnosis:  Primary osteoarthritis of right knee [M17.11]    Postoperative Diagnosis: Same as above.    Procedure performed: RIGHT KNEE JOINT INJECTION WITH SYNVISC UNDER FLUOROSCOPY with local    Anesthesia: Local      EBL: Less than 1 ml.    Procedure Description:   After reviewing the patient's history and performing a focused physical examination, the diagnosis was confirmed and contraindications such as infection and coagulopathy were ruled out.  Following review of potential side effects and complications, including but not necessarily limited to infection, allergic reaction, local tissue breakdown, nerve injury, and paresis, the patient indicated they understood and agreed to proceed. After obtaining the informed consent, the patient was brought to the procedure room and monitored.     The patient was brought to the procedure room and positioned supine with affected knee supported.    Fluoroscopy of the knee joint was taken in AP view.  The lateral aspect of the patellar tendon was marked.  A skin wheel was raised with 1% lidocaine.  Subsequently a 22-gauge 3.5 Quincke spinal needle was introduced under direct fluoroscopy under AP view.  The needle's position was confirmed by AP and lateral fluoroscopic view.  4 mL of Synvisc was injected after repeated aspiration.  The needle was then flushed with 1 mL 1% lidocaine.  The patient tolerated the procedure very well and was discharged home after recovery criteria were met.  The patient was advised to follow up with us in a one-week interval for subsequent injections, up to three.    Complications: None.    Follow up:   Clinic    Ramy Ramos MD

## 2024-03-07 NOTE — DISCHARGE INSTRUCTIONS
Home Care Instructions Following Your Pain Procedure     Fernanda,  It has been a pleasure to have you as our patient. To help you at home, you must follow these general discharge instructions. We will review these with you before you are discharged. It is our hope that you have a complete and uneventful recovery from our procedure.     General Instructions:  What to Expect:  Bandages from your procedure today can be removed when you get home.  Please avoid soaking and/or swimming for 24 hours.  Showering is okay  It is normal to have increased pain symptoms and/or pain at injection site for up to 3-5 days after procedure, you can use heat or ice (20 minutes on 20 minutes off) for comfort.  You may experience some temporary side effects which may include restlessness or insomnia, flushing of the face, or heart palpitations.  Please contact the provider if these symptoms do not resolve within 3-4 days.  Lightheadedness or nausea may occur and should resolve within 24 to 48 hours.  If you develop a headache after treatment, rest, drink fluids (with caffeine, if possible) and take mild over-the-counter pain medication.  If the headache does not improve with the above treatment, contact the physician.  Home Medications:  Resume all previously prescribed medication.  Please avoid taking NSAIDs (Non-Steriodal Anti-Inflammatory Drugs) such as:  Ibuprofen ( Advil, Motrin) Aleve (Naproxen), Diclofenac, Meloxicam for 6 hours after procedure.   If you are on Coumadin (Warfarin) or any other anti-coagulant (or \"blood thinning\") medication such as Plavix (Clopidogrel), Xarelto (Rivaroxaban), Eliquis (Apixaban), Effient (Prasugrel) etc., restart on the following day from the procedure unless otherwise directed by your provider.  If you are a diabetic, please increase the frequency of your glucose monitoring after the procedure as steroids may cause a temporary (2-3 day) increase in your blood sugar.  Contact your primary care  physician if your blood sugar remains elevated as you may require some medication adjustment.  Diet:  Resume your regular diet as tolerated.  Activity:  We recommend that you relax and rest during the rest of your procedure day.  If you feel weakness in your arms or legs do not drive.  Follow-up Appointment  Please schedule a follow-up visit within 3 to 4 weeks after your last procedure date.  Question or Concerns:  Feel free to call our office with any questions or concerns at [phone] (option #2)    Fernanda  Thank you for coming to [facility] for your procedure.  The nurses try very hard to make sure you receive the best care possible.  Your trust in them as well as us is greatly appreciated.    Thanks so much,   Dr. Ramy Ramos

## 2024-03-08 ENCOUNTER — TELEPHONE (OUTPATIENT)
Dept: PAIN CLINIC | Facility: CLINIC | Age: 64
End: 2024-03-08

## 2024-03-22 ENCOUNTER — OFFICE VISIT (OUTPATIENT)
Dept: FAMILY MEDICINE CLINIC | Facility: CLINIC | Age: 64
End: 2024-03-22
Payer: COMMERCIAL

## 2024-03-22 VITALS
HEART RATE: 87 BPM | WEIGHT: 189 LBS | BODY MASS INDEX: 34.78 KG/M2 | HEIGHT: 61.75 IN | OXYGEN SATURATION: 97 % | RESPIRATION RATE: 18 BRPM | DIASTOLIC BLOOD PRESSURE: 66 MMHG | SYSTOLIC BLOOD PRESSURE: 132 MMHG | TEMPERATURE: 97 F

## 2024-03-22 DIAGNOSIS — N61.1 LEFT BREAST ABSCESS: Primary | ICD-10-CM

## 2024-03-22 DIAGNOSIS — I10 PRIMARY HYPERTENSION: ICD-10-CM

## 2024-03-22 DIAGNOSIS — E11.9 CONTROLLED TYPE 2 DIABETES MELLITUS WITHOUT COMPLICATION, WITHOUT LONG-TERM CURRENT USE OF INSULIN (HCC): ICD-10-CM

## 2024-03-22 DIAGNOSIS — M47.26 OSTEOARTHRITIS OF SPINE WITH RADICULOPATHY, LUMBAR REGION: ICD-10-CM

## 2024-03-22 DIAGNOSIS — K21.9 GERD WITHOUT ESOPHAGITIS: ICD-10-CM

## 2024-03-22 DIAGNOSIS — E27.8 ADRENAL NODULE (HCC): ICD-10-CM

## 2024-03-22 PROCEDURE — 3008F BODY MASS INDEX DOCD: CPT | Performed by: FAMILY MEDICINE

## 2024-03-22 PROCEDURE — 99214 OFFICE O/P EST MOD 30 MIN: CPT | Performed by: FAMILY MEDICINE

## 2024-03-22 PROCEDURE — 3075F SYST BP GE 130 - 139MM HG: CPT | Performed by: FAMILY MEDICINE

## 2024-03-22 PROCEDURE — 3078F DIAST BP <80 MM HG: CPT | Performed by: FAMILY MEDICINE

## 2024-03-22 RX ORDER — AMOXICILLIN AND CLAVULANATE POTASSIUM 875; 125 MG/1; MG/1
1 TABLET, FILM COATED ORAL 2 TIMES DAILY
Qty: 20 TABLET | Refills: 0 | Status: SHIPPED | OUTPATIENT
Start: 2024-03-22 | End: 2024-04-01

## 2024-03-22 NOTE — PROGRESS NOTES
Fernanda Starkey is a 63 year old female.  HPI:  Pt is here for evaluation of left breast symptoms.  Notes L breast skin lesion for 4-5 days  On inferior breast  Some pain/tenderness in region. No drainage.   2weeks ago had some tenderness in R breast and had small similar bump, but this resolved on its own.  No f/c  No local itching.  Continues to have low back pain, worse on right side with radiation down right lower extremity and intermittent numbness/tingling in lower legs and feet.  Saw pain specialist and had MRI of lumbar spine.  Left knee pain  Better after genicular nerve block.  Having some flare of right knee pain.  Intra-articular steroid injections have offered transient relief.    Current Outpatient Medications   Medication Sig Dispense Refill           rosuvastatin 5 MG Oral Tab TAKE 1 TABLET BY MOUTH NIGHTLY 90 tablet 2    Ferrous Sulfate 325 (65 Fe) MG Oral Tab Take 1 tablet (325 mg total) by mouth daily with breakfast. 90 tablet 2    METFORMIN  MG Oral Tablet 24 Hr TAKE 1 TABLET BY MOUTH 2 TIMES A DAY WITH MEALS 180 tablet 0    LISINOPRIL-HYDROCHLOROTHIAZIDE 10-12.5 MG Oral Tab TAKE 1 TABLET BY MOUTH EVERY DAY 90 tablet 0    omeprazole 20 MG Oral Capsule Delayed Release Take 1 capsule (20 mg total) by mouth every morning before breakfast. 90 capsule 2    meclizine 12.5 MG Oral Tab 1 p.o. twice daily for 5 days, then twice daily as needed (Patient taking differently: 2 (two) times daily as needed. As needed for dizziness) 30 tablet 0    zolpidem 10 MG Oral Tab Take 1 tablet (10 mg total) by mouth nightly as needed for Sleep. 30 tablet 2    Cholecalciferol (VITAMIN D3 OR) Take 5,000 Units by mouth daily.      diazePAM (VALIUM) 5 MG Oral Tab Take 1 tablet (5 mg total) by mouth nightly as needed. 30 tablet 1    Multiple Vitamins-Minerals (PRESERVISION AREDS) Oral Tab Take by mouth daily.      loratadine 10 MG Oral Tab Take 1 tablet (10 mg total) by mouth daily.      ASPIRIN 81 OR Take by  mouth daily.           Past Medical History:    Anxiety    Arthritis    Cataracts, bilateral    Diabetes mellitus (HCC)    Disorder of liver    fatty liver    Esophageal reflux    Flatulence/gas pain/belching    Hearing loss    High blood pressure    High cholesterol    Hyperlipidemia    Leg swelling    Lumbago    Meningioma (HCC)    Migraines    Obesity    Osteoarthritis    Pain in joints    Prediabetes    Seropositive rheumatoid arthritis (HCC)    Sleep apnea    Sprain of neck    Stress    Unspecified essential hypertension    Unspecified essential hypertension    Vertigo    Vitamin D deficiency    Wears glasses    Weight gain       Past Surgical History:   Procedure Laterality Date    Cataract Bilateral     Colonoscopy  2020    Tubular Adenomas, recheck 5 years    Colonoscopy  10/02/2023    Internal Hemorrhoids. recheck 5 years    Contracept iud      REMOVED    Cyst aspiration right      Breast Lesion - benign cyst    Egd  10/02/2023    Normal, Path benign    Knee replacement surgery Left 2020          Other      boil resection from inner thighs due to virus    Other surgical history      groin abscess bilaterally         Social History     Socioeconomic History    Marital status:    Tobacco Use    Smoking status: Never     Passive exposure: Never    Smokeless tobacco: Never   Vaping Use    Vaping status: Never Used   Substance and Sexual Activity    Alcohol use: No     Alcohol/week: 0.0 standard drinks of alcohol    Drug use: No   Other Topics Concern    Caffeine Concern Yes     Comment: 2 cups daily    Exercise Yes     Comment: 1x week   Social History Narrative    ** Merged History Encounter **                        REVIEW OF SYSTEMS:  GENERAL HEALTH: feels well otherwise, mood is good  SKIN: As above  RESPIRATORY: denies shortness of breath with exertion, no cough  CARDIOVASCULAR: denies chest pain on exertion  GI: denies abdominal pain, GERD symptoms controlled with omeprazole  daily.  Will get flareup without medication.  : normal bladder  NEURO: denies headaches, denies dizziness    EXAM:  /66   Pulse 87   Temp 97.2 °F (36.2 °C) (Temporal)   Resp 18   Ht 5' 1.75\" (1.568 m)   Wt 189 lb (85.7 kg)   SpO2 97%   BMI 34.85 kg/m²   Wt Readings from Last 6 Encounters:   04/23/24 188 lb 8 oz (85.5 kg)   03/22/24 189 lb (85.7 kg)   03/06/24 191 lb (86.6 kg)   02/26/24 191 lb (86.6 kg)   02/05/24 191 lb (86.6 kg)   01/31/24 190 lb (86.2 kg)     GENERAL: well developed, well nourished,in no apparent distress, pleasant affect  SKIN: As below  LUNGS: clear to auscultation  CARDIO: RRR without murmur  NEURO: A&O x3, no focal deficits  Breasts: R upper breast with small 5mm hyperpigmented macular scar, NT, no underlying masses.  L breast inferiorly, at about  7 o'clock position with quarter sized erythematous papule with mild induration and overlying tenderness.  Small amount of purulent drainage noted with expression.  No underlying masses noted.  No nipple discharge.  Mild lateral breast tenderness bilaterally  No axillary or supraclavicular lymphadenopathy.    ASSESSMENT AND PLAN:    1. Left breast abscess  Keep area clean and dry.  - amoxicillin clavulanate 875-125 MG Oral Tab; Take 1 tablet by mouth 2 (two) times daily for 10 days.  Dispense: 20 tablet; Refill: 0  Warm compress twice daily.  Tylenol as needed.  Call if notes any fever/chills or if increased size of lesion or if increased symptoms.  Mammogram up-to-date.    2. Osteoarthritis of spine with radiculopathy, lumbar region  Noted having ongoing low back pain with radicular symptoms.  Had MRI of lumbar spine per pain specialist with findings of advanced facet arthropathy at multiple levels and mild right foraminal narrowing at L5-S1.  No significant spinal canal stenosis.  Reviewed back maintenance.  HEP.  Has done PT.  Advised to follow-up with pain specialist and may consider steroid injections locally.  Previous oral  steroid course did not offer much relief.  Tylenol arthritis, 2 tablets every 8 hours as needed.  Topical analgesics as needed.    3. Adrenal nodule (HCC)  Noted MRI report with finding of 2.3 cm left adrenal nodule.  Spoke with radiologist.  This was seen on previous MRI of abdomen done 10/10/2023.  No significant change allowing for differences in technique and consistent with benign adrenal myelolipoma.  No additional evaluation needed at this time.    4. Primary hypertension  Blood pressure controlled.  Continue current medication    5. GERD without esophagitis  Stable with avoidance of dietary triggers.  Prefer to avoid NSAIDs.  Continue omeprazole daily.    6.  Type 2 diabetes, controlled, no complications  Continue current metformin dose.  Get fasting labs done as previously ordered prior to next office visit.

## 2024-04-23 ENCOUNTER — OFFICE VISIT (OUTPATIENT)
Dept: FAMILY MEDICINE CLINIC | Facility: CLINIC | Age: 64
End: 2024-04-23

## 2024-04-23 VITALS
WEIGHT: 188.5 LBS | OXYGEN SATURATION: 98 % | HEART RATE: 89 BPM | RESPIRATION RATE: 16 BRPM | HEIGHT: 61.75 IN | DIASTOLIC BLOOD PRESSURE: 70 MMHG | BODY MASS INDEX: 34.69 KG/M2 | TEMPERATURE: 97 F | SYSTOLIC BLOOD PRESSURE: 130 MMHG

## 2024-04-23 DIAGNOSIS — N61.1 LEFT BREAST ABSCESS: ICD-10-CM

## 2024-04-23 DIAGNOSIS — M17.11 PRIMARY OSTEOARTHRITIS OF RIGHT KNEE: ICD-10-CM

## 2024-04-23 DIAGNOSIS — Z51.81 ENCOUNTER FOR MEDICATION MONITORING: ICD-10-CM

## 2024-04-23 DIAGNOSIS — G57.93 NEUROPATHY INVOLVING BOTH LOWER EXTREMITIES: ICD-10-CM

## 2024-04-23 DIAGNOSIS — M47.26 OSTEOARTHRITIS OF SPINE WITH RADICULOPATHY, LUMBAR REGION: Primary | ICD-10-CM

## 2024-04-23 DIAGNOSIS — E11.9 CONTROLLED TYPE 2 DIABETES MELLITUS WITHOUT COMPLICATION, WITHOUT LONG-TERM CURRENT USE OF INSULIN (HCC): ICD-10-CM

## 2024-04-23 DIAGNOSIS — M47.816 FACET ARTHROPATHY, LUMBAR: ICD-10-CM

## 2024-04-23 DIAGNOSIS — I10 ESSENTIAL HYPERTENSION: ICD-10-CM

## 2024-04-23 DIAGNOSIS — D64.9 NORMOCYTIC ANEMIA: ICD-10-CM

## 2024-04-23 DIAGNOSIS — K21.9 GASTROESOPHAGEAL REFLUX DISEASE WITHOUT ESOPHAGITIS: ICD-10-CM

## 2024-04-23 LAB
GLUCOSE BLOOD: 123
TEST STRIP LOT #: NORMAL NUMERIC

## 2024-04-23 PROCEDURE — 3078F DIAST BP <80 MM HG: CPT | Performed by: FAMILY MEDICINE

## 2024-04-23 PROCEDURE — 82947 ASSAY GLUCOSE BLOOD QUANT: CPT | Performed by: FAMILY MEDICINE

## 2024-04-23 PROCEDURE — 99215 OFFICE O/P EST HI 40 MIN: CPT | Performed by: FAMILY MEDICINE

## 2024-04-23 PROCEDURE — 3075F SYST BP GE 130 - 139MM HG: CPT | Performed by: FAMILY MEDICINE

## 2024-04-23 PROCEDURE — 3008F BODY MASS INDEX DOCD: CPT | Performed by: FAMILY MEDICINE

## 2024-04-23 PROCEDURE — 3044F HG A1C LEVEL LT 7.0%: CPT | Performed by: FAMILY MEDICINE

## 2024-04-23 RX ORDER — LISINOPRIL AND HYDROCHLOROTHIAZIDE 12.5; 1 MG/1; MG/1
1 TABLET ORAL DAILY
Qty: 90 TABLET | Refills: 1 | Status: SHIPPED | OUTPATIENT
Start: 2024-04-23

## 2024-04-23 RX ORDER — GABAPENTIN 300 MG/1
300 CAPSULE ORAL NIGHTLY
Qty: 30 CAPSULE | Refills: 2 | Status: SHIPPED | OUTPATIENT
Start: 2024-04-23

## 2024-04-23 RX ORDER — METFORMIN HYDROCHLORIDE 500 MG/1
500 TABLET, EXTENDED RELEASE ORAL 2 TIMES DAILY WITH MEALS
Qty: 180 TABLET | Refills: 1 | Status: SHIPPED | OUTPATIENT
Start: 2024-04-23

## 2024-04-23 RX ORDER — ZOLPIDEM TARTRATE 10 MG/1
10 TABLET ORAL NIGHTLY PRN
Qty: 30 TABLET | Refills: 2 | Status: CANCELLED | OUTPATIENT
Start: 2024-04-23 | End: 2025-04-18

## 2024-04-23 NOTE — PATIENT INSTRUCTIONS
Get fasting labs done this week.    Schedule appointment for mammogram due after June 20, 2024.    Stop Zolpidem for sleep    Start gabapentin 300 mg at bedtime (for pain and burning sensation in legs, and medication will also help with sleep)    Schedule appointments with specialists as referred.    Take over-the-counter iron supplement once daily.    Continue vitamin D3 5000 units daily.

## 2024-04-23 NOTE — PROGRESS NOTES
Fernanda Starkey is a 63 year old female.  HPI:  Patient here for follow-up on medication and multiple issues to discuss.  Having significant bilateral low back pain and also notes some burning sensation in bilateral lower extremities from knees down to feet/soles for about 2 weeks..  Symptoms occur throughout the day but worse at night.  Causes some disturbed sleep.    Intermittent radiation of back pain down right lower extremity.  C/o R knee pain flare. H/o OA. Has been taking care of spouse who has been ill,  and going up and down stairs more than usual which may have been causing current flareup but states knee pain has been bothering her a lot lately even prior to this.  History of left knee OA status post left TKR.  No significant pain in this joint now.  Patient has tried intra-articular injections for right knee.  Considering right TKR.  Would like to follow-up with specialist.  Taking Tylenol arthritis 1 p.o. twice daily/as needed, and does help transiently.  Lesion on breast is healed. Had a lot of purulent drainage after started abx course.  Completed antibiotic course.  Now has scar there but no more pain or swelling in area.  Has OTC Fe supplement but currently not taking  Taking Vitamin D3 5000 international units  daily  Has not been doing home blood sugar checks.  No polyuria or polydipsia  Denies any CP, no SOB, no palpitations.     Current Outpatient Medications   Medication Sig Dispense Refill    lisinopril-hydroCHLOROthiazide 10-12.5 MG Oral Tab Take 1 tablet by mouth daily. 90 tablet 1    metFORMIN  MG Oral Tablet 24 Hr Take 1 tablet (500 mg total) by mouth 2 (two) times daily with meals. 180 tablet 1    gabapentin 300 MG Oral Cap Take 1 capsule (300 mg total) by mouth nightly. 30 capsule 2    rosuvastatin 5 MG Oral Tab TAKE 1 TABLET BY MOUTH NIGHTLY 90 tablet 2    Ferrous Sulfate 325 (65 Fe) MG Oral Tab Take 1 tablet (325 mg total) by mouth daily with breakfast. 90 tablet 2     omeprazole 20 MG Oral Capsule Delayed Release Take 1 capsule (20 mg total) by mouth every morning before breakfast. 90 capsule 2    meclizine 12.5 MG Oral Tab 1 p.o. twice daily for 5 days, then twice daily as needed (Patient taking differently: 2 (two) times daily as needed. As needed for dizziness) 30 tablet 0    Cholecalciferol (VITAMIN D3 OR) Take 5,000 Units by mouth daily.      diazePAM (VALIUM) 5 MG Oral Tab Take 1 tablet (5 mg total) by mouth nightly as needed. 30 tablet 1    Multiple Vitamins-Minerals (PRESERVISION AREDS) Oral Tab Take by mouth daily.      loratadine 10 MG Oral Tab Take 1 tablet (10 mg total) by mouth daily.      ASPIRIN 81 OR Take by mouth daily.           Past Medical History:    Anxiety    Arthritis    Cataracts, bilateral    Diabetes mellitus (HCC)    Disorder of liver    fatty liver    Esophageal reflux    Flatulence/gas pain/belching    Hearing loss    High blood pressure    High cholesterol    Hyperlipidemia    Leg swelling    Lumbago    Meningioma (HCC)    Migraines    Obesity    Osteoarthritis    Pain in joints    Prediabetes    Seropositive rheumatoid arthritis (HCC)    Sleep apnea    Sprain of neck    Stress    Unspecified essential hypertension    Unspecified essential hypertension    Vertigo    Vitamin D deficiency    Wears glasses    Weight gain       Past Surgical History:   Procedure Laterality Date    Cataract Bilateral     Colonoscopy  2020    Tubular Adenomas, recheck 5 years    Colonoscopy  10/02/2023    Internal Hemorrhoids. recheck 5 years    Contracept iud      REMOVED    Cyst aspiration right      Breast Lesion - benign cyst    Egd  10/02/2023    Normal, Path benign    Knee replacement surgery Left 2020          Other      boil resection from inner thighs due to virus    Other surgical history      groin abscess bilaterally         Social History     Socioeconomic History    Marital status:    Tobacco Use    Smoking status: Never      Passive exposure: Never    Smokeless tobacco: Never   Vaping Use    Vaping status: Never Used   Substance and Sexual Activity    Alcohol use: No     Alcohol/week: 0.0 standard drinks of alcohol    Drug use: No   Other Topics Concern    Caffeine Concern Yes     Comment: 2 cups daily    Exercise Yes     Comment: 1x week   Social History Narrative    ** Merged History Encounter **                        REVIEW OF SYSTEMS:  GENERAL HEALTH: feels well otherwise, mood is good  SKIN: denies any unusual skin lesions or rashes  RESPIRATORY: denies shortness of breath with exertion, no cough  CARDIOVASCULAR: denies chest pain on exertion  GI: denies abdominal pain and denies heartburn, GERD symptoms controlled with medication  : normal bladder  NEURO: denies headaches, denies dizziness    EXAM:  /70   Pulse 89   Temp 97.3 °F (36.3 °C) (Temporal)   Resp 16   Ht 5' 1.75\" (1.568 m)   Wt 188 lb 8 oz (85.5 kg)   SpO2 98%   BMI 34.76 kg/m²   Wt Readings from Last 6 Encounters:   04/23/24 188 lb 8 oz (85.5 kg)   03/22/24 189 lb (85.7 kg)   03/06/24 191 lb (86.6 kg)   02/26/24 191 lb (86.6 kg)   02/05/24 191 lb (86.6 kg)   01/31/24 190 lb (86.2 kg)     GENERAL: well developed, well nourished,in no apparent distress, pleasant affect  SKIN: no rashes,no suspicious lesions  HEENT: atraumatic, normocephalic,  Conj clear,  NECK: supple,no adenopathy,noTM  LUNGS: clear to auscultation  CARDIO: RRR without murmur  GI: NABS, soft, obese, no tenderness, no masses, no HSM, ND  EXTREMITIES: no cyanosis, clubbing or edema  Right knee with significant medial and lateral joint line tenderness.  Limited flexion.  Extension good.  Back: No spinal tenderness.  Right greater than left paralumbar tenderness, positive right straight leg raise.  Motor strength right lower extremity 4/5.  Left lower extremity 5/5.  Negative left straight leg raise.  NEURO: A&O x3, no focal deficits  Limping gait due to knee pain.  Left breast with healed  hypopigmented scar at site of previous abscess.  No underlying mass.,  No erythema.  No drainage.    ASSESSMENT AND PLAN:    1. Osteoarthritis of spine with radiculopathy, lumbar region  2. Neuropathy involving both lower extremities  3. Facet arthropathy, lumbar  Reviewed back maintenance.  Advised HEP.  Has done PT in past  MRI imaging with advanced facet arthropathy at lower lumbar spine at multiple levels. No sig spinal stenosis.   - gabapentin 300 MG Oral Cap; Take 1 capsule (300 mg total) by mouth nightly.  Dispense: 30 capsule; Refill: 2, discussed possible side effects, and to call if any.  Discontinue Zolpidem  Need to consider injections.  - Pain Management Referral - In Network    4. Primary osteoarthritis of right knee  Having significant flare of right knee pain.  Knee brace as needed.  Topical analgesics as needed.  HEP.  Tylenol arthritis every 8 hours as needed.  - Ortho Referral - [facility])    5. Controlled type 2 diabetes mellitus without complication, without long-term current use of insulin ([facility])  Reviewed diet and exercise.  Check fasting labs as previously ordered.  Continue metformin twice daily.  Continue ACE inhibitor and statin.  - metFORMIN  MG Oral Tablet 24 Hr; Take 1 tablet (500 mg total) by mouth 2 (two) times daily with meals.  Dispense: 180 tablet; Refill: 1  - HemoCue Glucose 201 (Glucose blood test): 123    6. Essential hypertension  Blood pressure controlled.  CPM  - lisinopril-hydroCHLOROthiazide 10-12.5 MG Oral Tab; Take 1 tablet by mouth daily.  Dispense: 90 tablet; Refill: 1    7. Left breast abscess  Essentially resolved now.  Completed antibiotic course.  Has some residual scar at site of abscess.  Mammogram up-to-date.  Next due 6/2024    8. Gastroesophageal reflux disease without esophagitis  Stable with omeprazole daily and avoidance of dietary triggers.  Avoid NSAID use.    9. Normocytic anemia  Iron supplement daily  monitor    10. Encounter for  medication monitoring

## 2024-04-30 ENCOUNTER — LAB ENCOUNTER (OUTPATIENT)
Dept: LAB | Age: 64
End: 2024-04-30
Attending: FAMILY MEDICINE
Payer: COMMERCIAL

## 2024-04-30 DIAGNOSIS — E78.2 MIXED HYPERLIPIDEMIA: ICD-10-CM

## 2024-04-30 DIAGNOSIS — D64.9 NORMOCYTIC ANEMIA: ICD-10-CM

## 2024-04-30 DIAGNOSIS — E11.9 CONTROLLED TYPE 2 DIABETES MELLITUS WITHOUT COMPLICATION, WITHOUT LONG-TERM CURRENT USE OF INSULIN (HCC): ICD-10-CM

## 2024-04-30 DIAGNOSIS — I10 ESSENTIAL HYPERTENSION: ICD-10-CM

## 2024-04-30 DIAGNOSIS — Z51.81 ENCOUNTER FOR MEDICATION MONITORING: ICD-10-CM

## 2024-04-30 LAB
ALBUMIN SERPL-MCNC: 3.6 G/DL (ref 3.4–5)
ALBUMIN/GLOB SERPL: 0.9 {RATIO} (ref 1–2)
ALP LIVER SERPL-CCNC: 90 U/L
ALT SERPL-CCNC: 28 U/L
ANION GAP SERPL CALC-SCNC: 3 MMOL/L (ref 0–18)
AST SERPL-CCNC: 14 U/L (ref 15–37)
BASOPHILS # BLD AUTO: 0.08 X10(3) UL (ref 0–0.2)
BASOPHILS NFR BLD AUTO: 0.9 %
BILIRUB SERPL-MCNC: 0.8 MG/DL (ref 0.1–2)
BUN BLD-MCNC: 17 MG/DL (ref 9–23)
CALCIUM BLD-MCNC: 10.1 MG/DL (ref 8.5–10.1)
CHLORIDE SERPL-SCNC: 108 MMOL/L (ref 98–112)
CHOLEST SERPL-MCNC: 187 MG/DL (ref ?–200)
CO2 SERPL-SCNC: 31 MMOL/L (ref 21–32)
CREAT BLD-MCNC: 0.98 MG/DL
EGFRCR SERPLBLD CKD-EPI 2021: 65 ML/MIN/1.73M2 (ref 60–?)
EOSINOPHIL # BLD AUTO: 0.28 X10(3) UL (ref 0–0.7)
EOSINOPHIL NFR BLD AUTO: 3.3 %
ERYTHROCYTE [DISTWIDTH] IN BLOOD BY AUTOMATED COUNT: 14.1 %
EST. AVERAGE GLUCOSE BLD GHB EST-MCNC: 148 MG/DL (ref 68–126)
FASTING PATIENT LIPID ANSWER: YES
FASTING STATUS PATIENT QL REPORTED: YES
GLOBULIN PLAS-MCNC: 3.9 G/DL (ref 2.8–4.4)
GLUCOSE BLD-MCNC: 127 MG/DL (ref 70–99)
HBA1C MFR BLD: 6.8 % (ref ?–5.7)
HCT VFR BLD AUTO: 37.5 %
HDLC SERPL-MCNC: 91 MG/DL (ref 40–59)
HGB BLD-MCNC: 11.8 G/DL
IMM GRANULOCYTES # BLD AUTO: 0.02 X10(3) UL (ref 0–1)
IMM GRANULOCYTES NFR BLD: 0.2 %
LDLC SERPL CALC-MCNC: 72 MG/DL (ref ?–100)
LYMPHOCYTES # BLD AUTO: 3.08 X10(3) UL (ref 1–4)
LYMPHOCYTES NFR BLD AUTO: 35.8 %
MCH RBC QN AUTO: 26.7 PG (ref 26–34)
MCHC RBC AUTO-ENTMCNC: 31.5 G/DL (ref 31–37)
MCV RBC AUTO: 84.8 FL
MONOCYTES # BLD AUTO: 0.6 X10(3) UL (ref 0.1–1)
MONOCYTES NFR BLD AUTO: 7 %
NEUTROPHILS # BLD AUTO: 4.54 X10 (3) UL (ref 1.5–7.7)
NEUTROPHILS # BLD AUTO: 4.54 X10(3) UL (ref 1.5–7.7)
NEUTROPHILS NFR BLD AUTO: 52.8 %
NONHDLC SERPL-MCNC: 96 MG/DL (ref ?–130)
OSMOLALITY SERPL CALC.SUM OF ELEC: 297 MOSM/KG (ref 275–295)
PLATELET # BLD AUTO: 340 10(3)UL (ref 150–450)
POTASSIUM SERPL-SCNC: 4.4 MMOL/L (ref 3.5–5.1)
PROT SERPL-MCNC: 7.5 G/DL (ref 6.4–8.2)
RBC # BLD AUTO: 4.42 X10(6)UL
SODIUM SERPL-SCNC: 142 MMOL/L (ref 136–145)
TRIGL SERPL-MCNC: 146 MG/DL (ref 30–149)
VLDLC SERPL CALC-MCNC: 22 MG/DL (ref 0–30)
WBC # BLD AUTO: 8.6 X10(3) UL (ref 4–11)

## 2024-04-30 PROCEDURE — 83036 HEMOGLOBIN GLYCOSYLATED A1C: CPT

## 2024-04-30 PROCEDURE — 85025 COMPLETE CBC W/AUTO DIFF WBC: CPT

## 2024-04-30 PROCEDURE — 80053 COMPREHEN METABOLIC PANEL: CPT

## 2024-04-30 PROCEDURE — 80061 LIPID PANEL: CPT

## 2024-04-30 PROCEDURE — 36415 COLL VENOUS BLD VENIPUNCTURE: CPT

## 2024-05-16 ENCOUNTER — TELEPHONE (OUTPATIENT)
Dept: FAMILY MEDICINE CLINIC | Facility: CLINIC | Age: 64
End: 2024-05-16

## 2024-05-16 DIAGNOSIS — H90.3 SENSORINEURAL HEARING LOSS, BILATERAL: Primary | ICD-10-CM

## 2024-05-16 NOTE — TELEPHONE ENCOUNTER
Pt's spouse calling for a referral for a hearing test to be done with Lillian Hernandez     He States she saw her many years ago. Please advise

## 2024-05-16 NOTE — TELEPHONE ENCOUNTER
Last appointment 4/23/24. Note not yet signed, please confirm dx.  Audio referral pended for approval.

## 2024-05-21 NOTE — TELEPHONE ENCOUNTER
----- Message -----  From: BrennonOmid wren  Sent: 5/21/2024   9:33 AM CDT  To: [facility] Clinical Staff     Good morning,     Please be advised health plan closed request due to external referral, have patient contact health plan to obtained list of in network providers.     Thank you,  Dulce Wall to locate Christiana Gray on [facility] specialist list. Per search on internet, Christiana Gray is a midlevel practitioner. Dr. Candy Norris is on [facility] IHP specialist list.     - Pended referral to Dr. Norris as referrals need to be doctors and can't have mid levels name

## 2024-05-21 NOTE — TELEPHONE ENCOUNTER
Spoke to pt's spouse, who is upset, because he called last week regarding the referral for his spouse.  According to the spouse, these 2 providers are in network and the pt has an appt next week with Christiana Gray:    Candy Norris - Otolaryngoly  [phone]    Christiana Gray  - hearing/audiology     Pt's spouse is requesting a call back from Triage regarding the referral.

## 2024-05-22 NOTE — TELEPHONE ENCOUNTER
Received list of in network audiologist.  Letter in Nurse In box  Ok to do referral as indicated on letter.

## 2024-05-23 NOTE — TELEPHONE ENCOUNTER
Called and spoke to pt's spouse. Informed him we have placed a referral to Albino Chirinos, audiologist. He is upset and asking why pt has to see new audiologist if she has been seeing Christiana for the last 2 years. Explained to him Christiana is not in network for him. He said pt has not had any changes to her insurance so he is unsure why she is not in network. Pt asked if Dr. Norris is in network. Informed him she is and pt has a referral to Dr. Norris that has been authorized. Pt's  said he will talk to Dr. Norris about the audiologist.

## 2024-05-23 NOTE — TELEPHONE ENCOUNTER
Referral pended for Lara GRULLON as requested.    Dr Lucas, approve if agreeable and send back to Triage for spouse notification.

## 2024-06-25 ENCOUNTER — HOSPITAL ENCOUNTER (OUTPATIENT)
Dept: MAMMOGRAPHY | Age: 64
Discharge: HOME | End: 2024-06-25
Attending: FAMILY MEDICINE

## 2024-06-25 DIAGNOSIS — Z12.31 VISIT FOR SCREENING MAMMOGRAM: ICD-10-CM

## 2024-06-25 PROCEDURE — 77067 SCR MAMMO BI INCL CAD: CPT | Performed by: FAMILY MEDICINE

## 2024-06-25 PROCEDURE — 77063 BREAST TOMOSYNTHESIS BI: CPT | Performed by: FAMILY MEDICINE

## 2024-07-09 ENCOUNTER — LAB ENCOUNTER (OUTPATIENT)
Dept: LAB | Age: 64
End: 2024-07-09
Attending: FAMILY MEDICINE
Payer: COMMERCIAL

## 2024-07-09 DIAGNOSIS — D50.9 IRON DEFICIENCY ANEMIA, UNSPECIFIED IRON DEFICIENCY ANEMIA TYPE: ICD-10-CM

## 2024-07-09 LAB
BASOPHILS # BLD AUTO: 0.06 X10(3) UL (ref 0–0.2)
BASOPHILS NFR BLD AUTO: 0.7 %
DEPRECATED HBV CORE AB SER IA-ACNC: 29.4 NG/ML
EOSINOPHIL # BLD AUTO: 0.34 X10(3) UL (ref 0–0.7)
EOSINOPHIL NFR BLD AUTO: 3.9 %
ERYTHROCYTE [DISTWIDTH] IN BLOOD BY AUTOMATED COUNT: 14.4 %
HCT VFR BLD AUTO: 35.3 %
HGB BLD-MCNC: 11.4 G/DL
IMM GRANULOCYTES # BLD AUTO: 0.01 X10(3) UL (ref 0–1)
IMM GRANULOCYTES NFR BLD: 0.1 %
IRON SATN MFR SERPL: 9 %
IRON SERPL-MCNC: 42 UG/DL
LYMPHOCYTES # BLD AUTO: 3.9 X10(3) UL (ref 1–4)
LYMPHOCYTES NFR BLD AUTO: 44.4 %
MCH RBC QN AUTO: 27.4 PG (ref 26–34)
MCHC RBC AUTO-ENTMCNC: 32.3 G/DL (ref 31–37)
MCV RBC AUTO: 84.9 FL
MONOCYTES # BLD AUTO: 0.65 X10(3) UL (ref 0.1–1)
MONOCYTES NFR BLD AUTO: 7.4 %
NEUTROPHILS # BLD AUTO: 3.83 X10 (3) UL (ref 1.5–7.7)
NEUTROPHILS # BLD AUTO: 3.83 X10(3) UL (ref 1.5–7.7)
NEUTROPHILS NFR BLD AUTO: 43.5 %
PLATELET # BLD AUTO: 287 10(3)UL (ref 150–450)
RBC # BLD AUTO: 4.16 X10(6)UL
TIBC SERPL-MCNC: 457 UG/DL (ref 240–450)
TRANSFERRIN SERPL-MCNC: 307 MG/DL (ref 200–360)
WBC # BLD AUTO: 8.8 X10(3) UL (ref 4–11)

## 2024-07-09 PROCEDURE — 83550 IRON BINDING TEST: CPT

## 2024-07-09 PROCEDURE — 83540 ASSAY OF IRON: CPT

## 2024-07-09 PROCEDURE — 36415 COLL VENOUS BLD VENIPUNCTURE: CPT

## 2024-07-09 PROCEDURE — 82728 ASSAY OF FERRITIN: CPT

## 2024-07-09 PROCEDURE — 85025 COMPLETE CBC W/AUTO DIFF WBC: CPT

## 2024-07-18 ENCOUNTER — HOSPITAL ENCOUNTER (OUTPATIENT)
Dept: CT IMAGING | Age: 64
Discharge: HOME | End: 2024-07-18
Attending: FAMILY MEDICINE
Payer: COMMERCIAL

## 2024-07-18 DIAGNOSIS — R10.9 ABDOMINAL PAIN WITH RADIATION TO BACK: ICD-10-CM

## 2024-07-18 DIAGNOSIS — R10.9 RIGHT SIDED ABDOMINAL PAIN: ICD-10-CM

## 2024-07-18 PROCEDURE — 74176 CT ABD & PELVIS W/O CONTRAST: CPT | Performed by: FAMILY MEDICINE

## 2024-08-02 ENCOUNTER — OFFICE VISIT (OUTPATIENT)
Dept: FAMILY MEDICINE CLINIC | Facility: CLINIC | Age: 64
End: 2024-08-02
Payer: COMMERCIAL

## 2024-08-02 VITALS
BODY MASS INDEX: 34.6 KG/M2 | WEIGHT: 188 LBS | OXYGEN SATURATION: 98 % | SYSTOLIC BLOOD PRESSURE: 132 MMHG | HEART RATE: 97 BPM | HEIGHT: 61.75 IN | RESPIRATION RATE: 16 BRPM | TEMPERATURE: 97 F | DIASTOLIC BLOOD PRESSURE: 68 MMHG

## 2024-08-02 DIAGNOSIS — R91.1 PULMONARY NODULE: ICD-10-CM

## 2024-08-02 DIAGNOSIS — E66.9 TYPE 2 DIABETES MELLITUS WITH OBESITY (HCC): ICD-10-CM

## 2024-08-02 DIAGNOSIS — E11.69 TYPE 2 DIABETES MELLITUS WITH OBESITY (HCC): ICD-10-CM

## 2024-08-02 DIAGNOSIS — E11.9 CONTROLLED TYPE 2 DIABETES MELLITUS WITHOUT COMPLICATION, WITHOUT LONG-TERM CURRENT USE OF INSULIN (HCC): ICD-10-CM

## 2024-08-02 DIAGNOSIS — Z51.81 ENCOUNTER FOR MEDICATION MONITORING: ICD-10-CM

## 2024-08-02 DIAGNOSIS — K21.9 GASTROESOPHAGEAL REFLUX DISEASE WITHOUT ESOPHAGITIS: ICD-10-CM

## 2024-08-02 DIAGNOSIS — D50.9 IRON DEFICIENCY ANEMIA, UNSPECIFIED IRON DEFICIENCY ANEMIA TYPE: ICD-10-CM

## 2024-08-02 DIAGNOSIS — K76.0 HEPATIC STEATOSIS: ICD-10-CM

## 2024-08-02 DIAGNOSIS — M17.11 PRIMARY OSTEOARTHRITIS OF RIGHT KNEE: ICD-10-CM

## 2024-08-02 DIAGNOSIS — M47.26 OSTEOARTHRITIS OF SPINE WITH RADICULOPATHY, LUMBAR REGION: ICD-10-CM

## 2024-08-02 DIAGNOSIS — D25.9 UTERINE LEIOMYOMA, UNSPECIFIED LOCATION: ICD-10-CM

## 2024-08-02 DIAGNOSIS — G57.93 NEUROPATHY INVOLVING BOTH LOWER EXTREMITIES: Primary | ICD-10-CM

## 2024-08-02 PROCEDURE — 3075F SYST BP GE 130 - 139MM HG: CPT | Performed by: FAMILY MEDICINE

## 2024-08-02 PROCEDURE — 3044F HG A1C LEVEL LT 7.0%: CPT | Performed by: FAMILY MEDICINE

## 2024-08-02 PROCEDURE — 3078F DIAST BP <80 MM HG: CPT | Performed by: FAMILY MEDICINE

## 2024-08-02 PROCEDURE — 99214 OFFICE O/P EST MOD 30 MIN: CPT | Performed by: FAMILY MEDICINE

## 2024-08-02 PROCEDURE — 3008F BODY MASS INDEX DOCD: CPT | Performed by: FAMILY MEDICINE

## 2024-08-02 RX ORDER — GABAPENTIN 300 MG/1
300 CAPSULE ORAL NIGHTLY
Qty: 90 CAPSULE | Refills: 1 | Status: SHIPPED | OUTPATIENT
Start: 2024-08-02

## 2024-08-02 NOTE — PROGRESS NOTES
Fernanda Starkey is a 63 year old female.  HPI:  Patient is here for medication.  C/o burning pain/tingling in bilat feet distally around toes for about 3 weeks, R>L  Local massage helps.  R lower back pain persists but is improved overall..  Stopped Gabapentin after one month course.   Med did help with sxs   No med SEs  No feeling of cold to areas.  No color changes.  R knee pain persists and seems to be worse. Difficult to go up and down stairs lately  Takes Tylenol prn.   Saw GI for Iron def anemia.   In June was traveling at airport and was stopped due to metal finding in L lower pelvic region due to something noted on x-ray while going through security.  Had MMG done 6/2024  No breast complaints.   Sister with breast Cancer.       Current Outpatient Medications   Medication Sig Dispense Refill           lisinopril-hydroCHLOROthiazide 10-12.5 MG Oral Tab Take 1 tablet by mouth daily. 90 tablet 1    metFORMIN  MG Oral Tablet 24 Hr Take 1 tablet (500 mg total) by mouth 2 (two) times daily with meals. 180 tablet 1    rosuvastatin 5 MG Oral Tab TAKE 1 TABLET BY MOUTH NIGHTLY 90 tablet 2    Ferrous Sulfate 325 (65 Fe) MG Oral Tab Take 1 tablet (325 mg total) by mouth daily with breakfast. 90 tablet 2    omeprazole 20 MG Oral Capsule Delayed Release Take 1 capsule (20 mg total) by mouth every morning before breakfast. 90 capsule 2    meclizine 12.5 MG Oral Tab 1 p.o. twice daily for 5 days, then twice daily as needed (Patient taking differently: 2 (two) times daily as needed. As needed for dizziness) 30 tablet 0    Cholecalciferol (VITAMIN D3 OR) Take 5,000 Units by mouth daily.      diazePAM (VALIUM) 5 MG Oral Tab Take 1 tablet (5 mg total) by mouth nightly as needed. 30 tablet 1    Multiple Vitamins-Minerals (PRESERVISION AREDS) Oral Tab Take by mouth daily.      loratadine 10 MG Oral Tab Take 1 tablet (10 mg total) by mouth daily.      ASPIRIN 81 OR Take by mouth daily.           Past Medical History:     Anxiety    Arthritis    Cataracts, bilateral    Diabetes mellitus (HCC)    Disorder of liver    fatty liver    Esophageal reflux    Flatulence/gas pain/belching    Hearing loss    High blood pressure    High cholesterol    Hyperlipidemia    Leg swelling    Lumbago    Meningioma (HCC)    Migraines    Obesity    Osteoarthritis    Pain in joints    Prediabetes    Seropositive rheumatoid arthritis (HCC)    Sleep apnea    Sprain of neck    Stress    Unspecified essential hypertension    Unspecified essential hypertension    Vertigo    Vitamin D deficiency    Wears glasses    Weight gain       Past Surgical History:   Procedure Laterality Date    Cataract Bilateral     Colonoscopy  2020    Tubular Adenomas, recheck 5 years    Colonoscopy  10/02/2023    Internal Hemorrhoids. recheck 5 years    Contracept iud      REMOVED    Cyst aspiration right      Breast Lesion - benign cyst    Egd  10/02/2023    Normal, Path benign    Knee replacement surgery Left 2020          Other      boil resection from inner thighs due to virus    Other surgical history      groin abscess bilaterally         Social History     Socioeconomic History    Marital status:    Tobacco Use    Smoking status: Never     Passive exposure: Never    Smokeless tobacco: Never   Vaping Use    Vaping status: Never Used   Substance and Sexual Activity    Alcohol use: No     Alcohol/week: 0.0 standard drinks of alcohol    Drug use: No   Other Topics Concern    Caffeine Concern Yes     Comment: 2 cups daily    Exercise Yes     Comment: 1x week   Social History Narrative    ** Merged History Encounter **                    REVIEW OF SYSTEMS:  GENERAL HEALTH: feels well otherwise, mood is good  SKIN: denies any unusual skin lesions or rashes  RESPIRATORY: denies shortness of breath with exertion, no cough  CARDIOVASCULAR: denies chest pain on exertion  GI: denies abdominal pain, GERD sxs controlled with Omeprazole daily.  Gets flare of sxs  if misses med dose.   : normal bladder  NEURO: denies headaches, denies dizziness    EXAM:  /68   Pulse 97   Temp 97.4 °F (36.3 °C) (Temporal)   Resp 16   Ht 5' 1.75\" (1.568 m)   Wt 188 lb (85.3 kg)   SpO2 98%   BMI 34.66 kg/m²     GENERAL: well developed, well nourished,in no apparent distress, pleasant affect  SKIN: no rashes,no suspicious lesions  HEENT: atraumatic, normocephalic,  Conj clear  NECK: supple,no adenopathy,noTM  LUNGS: clear to auscultation  CARDIO: RRR without murmur  GI: NABS, soft, obese, no tenderness, no masses, no HSM, ND  EXTREMITIES: no cyanosis, clubbing or edema  Bilateral barefoot skin diabetic exam is normal, visualized feet and the appearance is normal.  Bilateral monofilament/sensation of both feet is abnormal with decreased sensation at distal feet/toes.  Pulsation pedal pulse exam of both lower legs/feet is normal as well.  NEURO: A&O x3, no focal deficits  Normal gait    ASSESSMENT AND PLAN:    1. Neuropathy involving both lower extremities  2. Osteoarthritis of spine with radiculopathy, lumbar region  Neuropathy symptoms likely due to lumbar radiculopathy, but may also be diabetic component.  Patient reports flareup of foot pain/neuropathy symptoms likely since stopped gabapentin.  Advised to restart gabapentin 300 mg at bedtime.  Could consider higher dose if not better and no meds SEs.  Reviewed footcare,  Tylenol arthritis as needed.  Topical analgesics as needed.  Avoid NSAIDs.  monitor    3. Controlled type 2 diabetes mellitus without complication, without long-term current use of insulin (HCC)  4. Type 2 diabetes mellitus with obesity (HCC)  Reviewed labs with fasting blood sugar okay.  Hemoglobin A1c 6.8, stable.  Continue metformin twice daily.  Reviewed diet/exercise/weight loss.  Could consider Ozempic or Mounjaro for patient.  Has appointment with weight loss clinic.  Patient will be working on some therapeutic lifestyle changes as well.  Reviewed  footcare.  Annual diabetic eye exam.  Continue ACE inhibitor and statin.    5. Iron deficiency anemia, unspecified iron deficiency anemia type  Patient has had full GI workup including normal capsule endoscopy.  Hemoglobin stable with recent labs.  Iron levels are low.  Continue iron supplement daily.  Increase iron in diet.  Has been referred to hematologist.  Advised to schedule appointment with specialist as referred.    6. Gastroesophageal reflux disease without esophagitis  Avoid NSAIDs.  Avoid possible dietary triggers.  Continue omeprazole daily.    7. Primary osteoarthritis of right knee  Having worsening right knee pain, affecting some of her daily activities.  Considering TKR and will follow-up with surgeon.  Knee brace as needed.  HEP.  Topical analgesics as needed.  Tylenol arthritis as needed.    8. Pulmonary nodule  Noted incidental finding of small pulmonary nodule, 2 mm in right lower lobe.  Patient is low risk.  No follow-up recommended at this time.  Also with incidental finding of small adrenal myelolipoma, smaller vs previous, clinically benign.     9. Uterine leiomyoma, unspecified location  noted patient report of some left lower pelvic calcification noted when going through security at airport.  Perhaps calcified uterine fibroids which were seen on recent CT.  Also has history of IUD which she reports was removed.  Recent CT pelvis  without any Significant pelvic abnormality.    10.  Hepatic steatosis   Fibrosis noted on previous elastography.  Workup unrevealing for autoimmune or viral etiology.  LFTs okay.  Weight loss encouraged.  To be monitored with Fibroscan per GI.    11.  Encounter for medication monitoring

## 2024-08-06 ENCOUNTER — TELEPHONE (OUTPATIENT)
Dept: FAMILY MEDICINE CLINIC | Facility: CLINIC | Age: 64
End: 2024-08-06

## 2024-08-06 ENCOUNTER — TELEPHONE (OUTPATIENT)
Facility: CLINIC | Age: 64
End: 2024-08-06

## 2024-08-06 DIAGNOSIS — E11.9 CONTROLLED TYPE 2 DIABETES MELLITUS WITHOUT COMPLICATION, WITHOUT LONG-TERM CURRENT USE OF INSULIN (HCC): Primary | ICD-10-CM

## 2024-08-06 NOTE — TELEPHONE ENCOUNTER
Patient scheduled with Dr. Wilson for right knee. No recent imaging in epic, patient's  aware to have patient arrive early for xray.     Future Appointments   Date Time Provider Department Center   8/21/2024  9:20 AM Cristina Farmer PA-C [id number] [address]   9/3/2024  2:00 PM Deb Prince MD  [facility]   9/30/2024  2:00 PM Doug Wilson MD [facility] [address]

## 2024-08-06 NOTE — TELEPHONE ENCOUNTER
LOV 8/2/24    Annual physical scheduled:  Future Appointments   Date Time Provider Department Center   8/30/2024  2:00 PM Inocencio Lucas MD [facility]       Ophtha referral pended for your approval if ok.  Please review and advise. Thank you.

## 2024-08-06 NOTE — TELEPHONE ENCOUNTER
FYI:    Fernanda Starkey (spoke to patient's spouse)  Called for a referral for a  diabetic eye exam of Ophthalmology.     Patient requesting referral to Ophthalmology   Specialty: to see Dr. Martha Rick  Does patient have a scheduled appointment?: yes, September 17, 2024

## 2024-08-07 NOTE — TELEPHONE ENCOUNTER
Called and spoke w/ pt's  [place]. Notified referral to Dr. Rick has been placed. Appreciative of order and call.     Called Dr. Rick office for fax #. Faxed referral to #: [phone]

## 2024-08-21 ENCOUNTER — OFFICE VISIT (OUTPATIENT)
Facility: CLINIC | Age: 64
End: 2024-08-21
Payer: COMMERCIAL

## 2024-08-21 VITALS
SYSTOLIC BLOOD PRESSURE: 130 MMHG | HEART RATE: 72 BPM | HEIGHT: 60.5 IN | RESPIRATION RATE: 18 BRPM | WEIGHT: 189 LBS | BODY MASS INDEX: 36.15 KG/M2 | DIASTOLIC BLOOD PRESSURE: 74 MMHG

## 2024-08-21 DIAGNOSIS — G47.33 OSA ON CPAP: ICD-10-CM

## 2024-08-21 DIAGNOSIS — K76.0 FATTY LIVER: ICD-10-CM

## 2024-08-21 DIAGNOSIS — E78.2 MIXED HYPERLIPIDEMIA: ICD-10-CM

## 2024-08-21 DIAGNOSIS — E11.9 CONTROLLED TYPE 2 DIABETES MELLITUS WITHOUT COMPLICATION, WITHOUT LONG-TERM CURRENT USE OF INSULIN (HCC): ICD-10-CM

## 2024-08-21 DIAGNOSIS — E66.01 CLASS 2 SEVERE OBESITY WITH SERIOUS COMORBIDITY AND BODY MASS INDEX (BMI) OF 36.0 TO 36.9 IN ADULT, UNSPECIFIED OBESITY TYPE (HCC): ICD-10-CM

## 2024-08-21 DIAGNOSIS — Z51.81 ENCOUNTER FOR THERAPEUTIC DRUG LEVEL MONITORING: Primary | ICD-10-CM

## 2024-08-21 DIAGNOSIS — I10 PRIMARY HYPERTENSION: ICD-10-CM

## 2024-08-21 PROCEDURE — 3078F DIAST BP <80 MM HG: CPT | Performed by: PHYSICIAN ASSISTANT

## 2024-08-21 PROCEDURE — 3075F SYST BP GE 130 - 139MM HG: CPT | Performed by: PHYSICIAN ASSISTANT

## 2024-08-21 PROCEDURE — 99204 OFFICE O/P NEW MOD 45 MIN: CPT | Performed by: PHYSICIAN ASSISTANT

## 2024-08-21 PROCEDURE — 3008F BODY MASS INDEX DOCD: CPT | Performed by: PHYSICIAN ASSISTANT

## 2024-08-21 RX ORDER — TIRZEPATIDE 2.5 MG/.5ML
2.5 INJECTION, SOLUTION SUBCUTANEOUS WEEKLY
Qty: 2 ML | Refills: 0 | Status: SHIPPED | OUTPATIENT
Start: 2024-08-21

## 2024-08-21 NOTE — PROGRESS NOTES
HISTORY OF PRESENT ILLNESS  Chief Complaint   Patient presents with    Weight Problem     Ref pcp, no prior [facility] management, open to meds       Fernanda Crispin Starkey is a 63 year old female new to our office today for initiation of medical weight loss program.  Patient presents today with c/o excess weight.     Interpretor services used    Would like to lose weight, feels uncomfortable with weight gain  Reduced portion size    Gradual weight gain over the years    Reason/goal for weight loss: lose and maintain weight loss    Previous weight loss efforts in the past/medication: dietary changes    Interest in Bariatric surgery:     Barriers to weight loss:     Wt Readings from Last 6 Encounters:   08/21/24 189 lb (85.7 kg)   08/05/24 186 lb 1.6 oz (84.4 kg)   08/02/24 188 lb (85.3 kg)   07/01/24 184 lb 6.4 oz (83.6 kg)   04/23/24 188 lb 8 oz (85.5 kg)   03/22/24 189 lb (85.7 kg)          Breakfast Lunch Dinner Snacks Fluids   Oatmeal   Hard boiled egg Fruits  Plain bread cheung, rice, lettuce Similar to lunch Eggs   Water   Green tea       Social hx and lifestyle reviewed:    Work: manages household  Marital status: Yes  Support:   Tobacco use: None  ETOH use: None  Supplements: Vit D  Exercise: outside of household duties none  Stress level: 7-8/10  Sleep hours and integrity: 4-5 hrs a night JOSÉ LUIS with CPAP    MEDICAL HISTORY  PMH reviewed:   Cardiac disorders:negative    Depression/anxiety: negative  Glaucoma: negative   Kidney stones: negative   Eating disorder: negative   Migraines: Yes  Seizures: negative   Joint-related conditions: Knee, RA  Liver disease: fatty liver  Renal disease: negative   Diabetes: Yes  Thyroid disease: negative   Constipation: negative  Miscarriage: negative   DVT: negative    Family or personal history of Pancreatic issues / Medullary Thyroid Cancer: negative    History of bariatric surgery: negative     FMH reviewed: obesity in parent/s or sibling:     REVIEW OF SYSTEMS  GENERAL: feels well  otherwise  NECK: denies thickening   LUNGS: denies shortness of breath with exertion, no apnea   CARDIOVASCULAR: denies chest pain on exertion , denies palpitations or pedal edema  GI: denies abdominal pain.  No N/V/D/C  MUSCULOSKELETAL: see above  NEURO: denies headaches   PSYCH: denies change in behavior or mood, denies feeling sad or depressed     EXAM  /74   Pulse 72   Resp 18   Ht 5' 0.5\" (1.537 m)   Wt 189 lb (85.7 kg)   BMI 36.30 kg/m² , Percent body fat: F >32%       GENERAL: well developed, well nourished, in no apparent distress  SKIN: warm, pink, dry without rashes to exposed area   EYES: conjunctiva pink, sclera non icteric, PERRL  HEENT: atraumatic, normocephalic, O/p: Mallampati score  NECK: supple, non tender, no adenopathy, no thyromegaly  LUNGS: CTA in all fields, breathing non labored  CARDIO: RRR without murmur, normal S1 and S2 without clicks or gallops, no pedal edema. EKG not performed in office  GI: rotund, no masses, HSM or tenderness  MUSCULOSKELETAL: grossly intact   NEURO: Oriented times three, full ROM of bilateral UE/LE  PSYCH: pleasant, cooperative, normal mood and affect    Lab Results   Component Value Date    WBC 8.8 07/09/2024    RBC 4.16 07/09/2024    HGB 11.4 (L) 07/09/2024    HCT 35.3 07/09/2024    MCV 84.9 07/09/2024    MCH 27.4 07/09/2024    MCHC 32.3 07/09/2024    RDW 14.4 07/09/2024    .0 07/09/2024     Lab Results   Component Value Date     (H) 04/30/2024    BUN 17 04/30/2024    BUNCREA NOT APPLICABLE 09/20/2022    CREATSERUM 0.98 04/30/2024    ANIONGAP 3 04/30/2024    GFR 87 10/28/2017    GFRNAA 91 12/15/2021    GFRAA 105 12/15/2021    CA 10.1 04/30/2024    OSMOCALC 297 (H) 04/30/2024    ALKPHO 90 04/30/2024    AST 14 (L) 04/30/2024    ALT 28 04/30/2024    BILT 0.8 04/30/2024    TP 7.5 04/30/2024    ALB 3.6 04/30/2024    GLOBULIN 3.9 04/30/2024    AGRATIO 1.6 09/20/2022     04/30/2024    K 4.4 04/30/2024     04/30/2024    CO2 31.0  04/30/2024     Lab Results   Component Value Date     (H) 04/30/2024    A1C 6.8 (H) 04/30/2024     Lab Results   Component Value Date    CHOLEST 187 04/30/2024    TRIG 146 04/30/2024    HDL 91 (H) 04/30/2024    LDL 72 04/30/2024    VLDL 22 04/30/2024    TCHDLRATIO 2.7 09/20/2022    NONHDLC 96 04/30/2024     Lab Results   Component Value Date    TSH 1.970 11/13/2023    TSHT4 2.91 09/20/2022     Lab Results   Component Value Date    B12 1,432 (H) 09/06/2018    VITB12 336 09/20/2022     Lab Results   Component Value Date    VITD 51.8 11/13/2023       Current Outpatient Medications on File Prior to Visit   Medication Sig Dispense Refill    gabapentin 300 MG Oral Cap Take 1 capsule (300 mg total) by mouth nightly. 90 capsule 1    lisinopril-hydroCHLOROthiazide 10-12.5 MG Oral Tab Take 1 tablet by mouth daily. 90 tablet 1    metFORMIN  MG Oral Tablet 24 Hr Take 1 tablet (500 mg total) by mouth 2 (two) times daily with meals. 180 tablet 1    rosuvastatin 5 MG Oral Tab TAKE 1 TABLET BY MOUTH NIGHTLY 90 tablet 2    Ferrous Sulfate 325 (65 Fe) MG Oral Tab Take 1 tablet (325 mg total) by mouth daily with breakfast. 90 tablet 2    omeprazole 20 MG Oral Capsule Delayed Release Take 1 capsule (20 mg total) by mouth every morning before breakfast. 90 capsule 2    meclizine 12.5 MG Oral Tab 1 p.o. twice daily for 5 days, then twice daily as needed (Patient taking differently: 2 (two) times daily as needed. As needed for dizziness) 30 tablet 0    Cholecalciferol (VITAMIN D3 OR) Take 5,000 Units by mouth daily.      diazePAM (VALIUM) 5 MG Oral Tab Take 1 tablet (5 mg total) by mouth nightly as needed. 30 tablet 1    Multiple Vitamins-Minerals (PRESERVISION AREDS) Oral Tab Take by mouth daily.      loratadine 10 MG Oral Tab Take 1 tablet (10 mg total) by mouth daily.      ASPIRIN 81 OR Take by mouth daily.       No current facility-administered medications on file prior to visit.       ASSESSMENT  Initial Weight Data and  Goal Weight Loss:  Weight Calculations  Initial Weight: 189 lbs  Initial Weight Date: 08/21/24  Today's Weight: 189 lbs  5% Goal: 9.45  10% Goal: 18.9  Total Weight Loss: 0 lbs    Diagnoses and all orders for this visit:    Encounter for therapeutic drug level monitoring  -     Tirzepatide (MOUNJARO) 2.5 MG/0.5ML Subcutaneous Solution Pen-injector; Inject 2.5 mg into the skin once a week.  -     [facility] Dietician Referral  ([facility] USE ONLY)    Class 2 severe obesity with serious comorbidity and body mass index (BMI) of 36.0 to 36.9 in adult, unspecified obesity type (HCC)  -     Tirzepatide (MOUNJARO) 2.5 MG/0.5ML Subcutaneous Solution Pen-injector; Inject 2.5 mg into the skin once a week.  -     [facility] Dietician Referral  ([facility] USE ONLY)    Controlled type 2 diabetes mellitus without complication, without long-term current use of insulin (HCC)  -     Tirzepatide (MOUNJARO) 2.5 MG/0.5ML Subcutaneous Solution Pen-injector; Inject 2.5 mg into the skin once a week.  -     [facility] Dietician Referral  ([facility] USE ONLY)    Mixed hyperlipidemia  -     Tirzepatide (MOUNJARO) 2.5 MG/0.5ML Subcutaneous Solution Pen-injector; Inject 2.5 mg into the skin once a week.  -     [facility] Dietician Referral  ([facility] USE ONLY)    Fatty liver  -     Tirzepatide (MOUNJARO) 2.5 MG/0.5ML Subcutaneous Solution Pen-injector; Inject 2.5 mg into the skin once a week.  -     [facility] Dietician Referral  ([facility] USE ONLY)    JOSÉ LUIS on CPAP  -     Tirzepatide (MOUNJARO) 2.5 MG/0.5ML Subcutaneous Solution Pen-injector; Inject 2.5 mg into the skin once a week.  -     [facility] Dietician Referral  ([facility] USE ONLY)    Primary hypertension  -     Tirzepatide (MOUNJARO) 2.5 MG/0.5ML Subcutaneous Solution Pen-injector; Inject 2.5 mg into the skin once a week.  -     [facility] Dietician Referral  ([facility] USE ONLY)        PLAN  Initial Weight: 189 lbs  Initial Weight Date: 08/21/24  Today's Weight: 189 lbs  5% Goal: 9.45  10% Goal: 18.9  Total Weight Loss: 0 lbs    Will begin Mounjaro 2.5mg weekly - cristalies  any personal or family history of pancreatitis, pancreatic cancer, thyroid cancer, MEN2 - discussed MOA, advised side effects and adverse effects of medication.  DM - continue current medications, low carb diet  HTN - blood pressure well controlled on current medication - advised signs and symptoms of hypotension and will monitor with continued weight loss  HLD - stable on current medication rosuvastatin, will continue, will continue to work on lifestyle modifications  CPAP compliance  Fatty liver - low carb, low fat diet  Begin logging foods - macronutrient goals discussed  -low carb high protein  -portion control  -Limit carbohydrates  -Eat breakfast every day   -Don't skip meals   -Read nutrition labels and keep a food log  -drink a lot of water 65 oz of water per day  - Do not drink your calories (no soda, juice, high calorie coffee drinks, limit alcohol)  - Do not eat late at night  Medication use and side effects reviewed with patient.  Medication contraindications: stimulant  Follow up with dietitian and psychologist as recommended.  Discussed the role of sleep and stress in weight management.  Labs orders as above.  Counseled on comprehensive weight loss plan including attention to nutrition, exercise and behavior/stress management for success. See patient instruction below for more details.  Weight Loss Consent to treat reviewed and signed.    Total time spent on chart review, pre-charting, obtaining history, counseling, and educating, reviewing labs was 45 minutes.      Patient Instructions   1200 calories a day    Moderate Carb (30/35/35)  83g  Protein    43g  Fats    97g  Carbs    Lower Carb (40/40/20)  111g  Protein    49g  Fats    55g  Carbs    We are here to support you with weight loss, but please remember that you still need your primary care provider for your routine health maintenance.      PLAN:  Begin mounjaro  Referral for dietician  Follow up with me in 3 months  Schedule follow up  appointments: Robert Pascual (dietitian) or Shanice Santos (presurgery dietitian)   Check for insurance coverage for dietitian and labwork prior to scheduling appointment.     Please try to work on the following dietary changes:  Goals: Aim for 20-30 grams of protein/ meal  Eat 4-6 vegetables/day  Avoid skipping meals- eat every 4-5 hours  Aim for 3 meals/day  2. Drink lots of water and cut down on soda/juice consumption if soda/juice drinker  3. Focus on protein: (15-30 grams with each meal) ie. greek yogurt, cottage cheese, string cheese, hard boiled eggs  4. Healthy snacks: peanut butter and apples, hummus and carrots, berries, nuts (1/4 cup), tuna and crackers                 Protein Shakes: Premier protein or Core Power                Protein Bars: Rx Bars, Oatmega, Power Crunch                 [place] balanced breaks (cheese and nuts)- without chocolate  5. Reduce carbohydrates which includes sweets as well as rice, pasta, potatoes, bread, corn and instead choose whole grain options or more protein or vegetables (4-6 servings of vegetables per day)  6. Get a good night of sleep  7. Try to decrease stress in life     Please download apps:  1. My Fitness Pal, Lose it, My Net Diary diamond to help you to monitor daily dietary intake and you will be able to see if you are eating the right amount of calories, protein, carbs                With My Fitness Pal-->When you set-up the diamond or need to adjust settings:                Goals should include:                 Lose 1.5-2 lbs per week                Activity level: not very active (can't count exercise towards calorie number per day)                   ** Daily INPUT> Look at nutrition section-- \"nutrients\" and it will break down your macros for the day (ie. Protein, carbs, fibers, sugars and fats). Try to stay within these numbers daily     2. \"7 minute workout\" to help with exercise/activity which takes 7 minutes of your day and that you can do at home!   3. \"Calm\" or  \"Headspace\" which helps with mindfulness, meditation, clarity, sleep, and ismael to your daily life.   4. [employer] blog for healthy recipe ideas  5. [employer] for low carb resources    HIGH PROTEIN SNACK IDEAS  -cottage cheese  -plain yogurt  -kefir  -hard-boiled eggs  -natural cheeses  -nuts (measure portion size)   -unsweetened nut butters  -dried edamame   -te seeds soaked in water or almond milk  -soy nuts  -cured meats (monitor for sodium issues)   -hummus with vegetables  -bean dip with vegetables     FRUIT  Low carb fruit options   Raspberries: Half a cup (60 grams) contains 3 grams of carbs.  Blackberries: Half a cup (70 grams) contains 4 grams of carbs.  Strawberries: Half a cup (100 grams) contains 6 grams of carbs.  Blueberries: Half a cup (50 grams) contains 6 grams of carbs.  Plum: One medium-sized (80 grams) contains 6 grams of carbs.     VEGETABLES  Low carb vegetables              Delicious and Healthy Snacks     Tomato and cheese plate--mix 1/2 cup   cherry tomatoes, 1 cup fresh mini mozzarella   balls, ½ cup olives     Cottage cheese & berries--top ½ cup of   cottage cheese with 1 cup of berries of   choice. Add one handful of pecans for some   extra protein, fat and fiber.      Apple + Greek Yogurt + Nut butter*--cut up   an apple and dip in 1/3 cup of Greek yogurt   and 2 tbsp of nut butter.     Hummus & veggies--dip baby carrots, pepper   strips, or snap peas in ¼ cup hummus.     Nuts--munch on 1oz of any kind of nut (about   ¼ cup). In the shell will help to slow down!   Carrots and cheese plate-- 1 cup of baby   carrots (or veg of choice) with ½ cup cheddar   cheese cubes and 2-4 low sodium, nitrate   free turkey slices     Yogurt & berries--mix ½-1 cup berries in a   6oz container of plain or low sugar fruit   flavored 2% Greek yogurt (less than15 grams   of sugar per serving). Chobani (Less Sugar) or   Siggis are examples.     Hard boiled egg & fruit--1 to 2 hardboiled   eggs and a  tangerine or other small serving   of fruit     Tuna & avocado--put 2oz of tuna (one pouch)   on 1/3 of an avocado or 2 tablespoons   guacamole and top with salsa     String cheese & veggies--one piece cheese   (¾ oz) and 1 cup snap peas or other   vegetables     Cauliflower rice & cheese--sprinkle ¼ cup   shredded cheese on 1 cup cauliflower rice   and microwave until cheese melts     Deviled eggs--3 deviled egg halves     Bean dip & veggies- ½ cup refried beans   with ¼ cup shredded cheese melted on   top. Use as a dip for celery or red pepper   strips or eat plain.     [place] Balanced Breaks-or make your   own with ½ oz nuts, ½ oz cheese, and 1   teaspoon dried fruit.     Edamame with fruit and nuts--1-2   mandarin orange, ¼ cup cashews, ¼ cup   edamame      Low fat chicken, egg, or tuna salad--mix   2oz chicken, tuna, or with 1 teaspoon   mayonnaise, 1 teaspoon Dario mustard,   and 1 teaspoon plain yogurt. Add chopped   celery, onions, walnuts, Granny Smith   apples, or dried cranberries for extra flavor.     [place] break--turkey, chicken, or nut   butter on 1 slice whole grain bread.     Protein Shake--Hurd, Core Power, Orgain,   Premier Protein, Fairlife     Protein Bar--Quest, Oatmega, RX Bar, [place]   Bars     Protein Chips - Legendary and Quest chips  These snacks contain protein,   fiber and fat to keep you full and   energized!   *If you have a peanut allergy, you can substitute with   Sun Butter (made from sunflower seeds) or WOW butter   (made from soy      No follow-ups on file.    Patient verbalizes understanding.    Cristina Farmer PA-C  8/21/2024

## 2024-08-21 NOTE — PATIENT INSTRUCTIONS
1200 calories a day    Moderate Carb (30/35/35)  83g  Protein    43g  Fats    97g  Carbs    Lower Carb (40/40/20)  111g  Protein    49g  Fats    55g  Carbs    We are here to support you with weight loss, but please remember that you still need your primary care provider for your routine health maintenance.      PLAN:  Begin mounjaro  Referral for dietician  Follow up with me in 3 months  Schedule follow up appointments: Robert Pascual (dietitian) or Shanice Santos (presurgery dietitian)   Check for insurance coverage for dietitian and labwork prior to scheduling appointment.     Please try to work on the following dietary changes:  Goals: Aim for 20-30 grams of protein/ meal  Eat 4-6 vegetables/day  Avoid skipping meals- eat every 4-5 hours  Aim for 3 meals/day  2. Drink lots of water and cut down on soda/juice consumption if soda/juice drinker  3. Focus on protein: (15-30 grams with each meal) ie. greek yogurt, cottage cheese, string cheese, hard boiled eggs  4. Healthy snacks: peanut butter and apples, hummus and carrots, berries, nuts (1/4 cup), tuna and crackers                 Protein Shakes: Premier protein or Core Power                Protein Bars: Rx Bars, Oatmega, Power Crunch                 [place] balanced breaks (cheese and nuts)- without chocolate  5. Reduce carbohydrates which includes sweets as well as rice, pasta, potatoes, bread, corn and instead choose whole grain options or more protein or vegetables (4-6 servings of vegetables per day)  6. Get a good night of sleep  7. Try to decrease stress in life     Please download apps:  1. My Fitness Pal, Lose it, My Net Diary diamond to help you to monitor daily dietary intake and you will be able to see if you are eating the right amount of calories, protein, carbs                With My Fitness Pal-->When you set-up the diamond or need to adjust settings:                Goals should include:                 Lose 1.5-2 lbs per week                Activity level: not  very active (can't count exercise towards calorie number per day)                   ** Daily INPUT> Look at nutrition section-- \"nutrients\" and it will break down your macros for the day (ie. Protein, carbs, fibers, sugars and fats). Try to stay within these numbers daily     2. \"7 minute workout\" to help with exercise/activity which takes 7 minutes of your day and that you can do at home!   3. \"Calm\" or \"Headspace\" which helps with mindfulness, meditation, clarity, sleep, and ismael to your daily life.   4. [employer] blog for healthy recipe ideas  5. [employer] for low carb resources    HIGH PROTEIN SNACK IDEAS  -cottage cheese  -plain yogurt  -kefir  -hard-boiled eggs  -natural cheeses  -nuts (measure portion size)   -unsweetened nut butters  -dried edamame   -te seeds soaked in water or almond milk  -soy nuts  -cured meats (monitor for sodium issues)   -hummus with vegetables  -bean dip with vegetables     FRUIT  Low carb fruit options   Raspberries: Half a cup (60 grams) contains 3 grams of carbs.  Blackberries: Half a cup (70 grams) contains 4 grams of carbs.  Strawberries: Half a cup (100 grams) contains 6 grams of carbs.  Blueberries: Half a cup (50 grams) contains 6 grams of carbs.  Plum: One medium-sized (80 grams) contains 6 grams of carbs.     VEGETABLES  Low carb vegetables              Delicious and Healthy Snacks     Tomato and cheese plate--mix 1/2 cup   cherry tomatoes, 1 cup fresh mini mozzarella   balls, ½ cup olives     Cottage cheese & berries--top ½ cup of   cottage cheese with 1 cup of berries of   choice. Add one handful of pecans for some   extra protein, fat and fiber.      Apple + Greek Yogurt + Nut butter*--cut up   an apple and dip in 1/3 cup of Greek yogurt   and 2 tbsp of nut butter.     Hummus & veggies--dip baby carrots, pepper   strips, or snap peas in ¼ cup hummus.     Nuts--munch on 1oz of any kind of nut (about   ¼ cup). In the shell will help to slow down!   Carrots and  cheese plate-- 1 cup of baby   carrots (or veg of choice) with ½ cup cheddar   cheese cubes and 2-4 low sodium, nitrate   free turkey slices     Yogurt & berries--mix ½-1 cup berries in a   6oz container of plain or low sugar fruit   flavored 2% Greek yogurt (less than15 grams   of sugar per serving). Chobani (Less Sugar) or   Siggis are examples.     Hard boiled egg & fruit--1 to 2 hardboiled   eggs and a tangerine or other small serving   of fruit     Tuna & avocado--put 2oz of tuna (one pouch)   on 1/3 of an avocado or 2 tablespoons   guacamole and top with salsa     String cheese & veggies--one piece cheese   (¾ oz) and 1 cup snap peas or other   vegetables     Cauliflower rice & cheese--sprinkle ¼ cup   shredded cheese on 1 cup cauliflower rice   and microwave until cheese melts     Deviled eggs--3 deviled egg halves     Bean dip & veggies- ½ cup refried beans   with ¼ cup shredded cheese melted on   top. Use as a dip for celery or red pepper   strips or eat plain.     [place] Balanced Breaks-or make your   own with ½ oz nuts, ½ oz cheese, and 1   teaspoon dried fruit.     Edamame with fruit and nuts--1-2   mandarin orange, ¼ cup cashews, ¼ cup   edamame      Low fat chicken, egg, or tuna salad--mix   2oz chicken, tuna, or with 1 teaspoon   mayonnaise, 1 teaspoon Dario mustard,   and 1 teaspoon plain yogurt. Add chopped   celery, onions, walnuts, Granny Smith   apples, or dried cranberries for extra flavor.     [place] break--turkey, chicken, or nut   butter on 1 slice whole grain bread.     Protein Shake--Hurd, Core Power, Orgain,   Premier Protein, Fairlife     Protein Bar--Quest, Oatmega, RX Bar, [place]   Bars     Protein Chips - Legendary and Quest chips  These snacks contain protein,   fiber and fat to keep you full and   energized!   *If you have a peanut allergy, you can substitute with   Sun Butter (made from sunflower seeds) or WOW butter   (made from soy

## 2024-09-03 ENCOUNTER — OFFICE VISIT (OUTPATIENT)
Dept: HEMATOLOGY/ONCOLOGY | Facility: HOSPITAL | Age: 64
End: 2024-09-03
Attending: INTERNAL MEDICINE
Payer: COMMERCIAL

## 2024-09-03 VITALS
OXYGEN SATURATION: 97 % | RESPIRATION RATE: 18 BRPM | SYSTOLIC BLOOD PRESSURE: 130 MMHG | WEIGHT: 183.5 LBS | TEMPERATURE: 97 F | DIASTOLIC BLOOD PRESSURE: 76 MMHG | HEART RATE: 97 BPM | BODY MASS INDEX: 35 KG/M2

## 2024-09-03 DIAGNOSIS — D64.9 ANEMIA, UNSPECIFIED TYPE: ICD-10-CM

## 2024-09-03 DIAGNOSIS — D50.9 IRON DEFICIENCY ANEMIA, UNSPECIFIED IRON DEFICIENCY ANEMIA TYPE: Primary | ICD-10-CM

## 2024-09-03 PROCEDURE — 99204 OFFICE O/P NEW MOD 45 MIN: CPT | Performed by: INTERNAL MEDICINE

## 2024-09-03 NOTE — CONSULTS
Cancer Center Report of Consultation    Patient Name: Fernanda Starkey   YOB: 1960   Medical Record Number: [id number]   CSN: [id number]   Consulting Physician: Deb Prince MD  Referring Physician(s): Lupe De León  Date of Consultation: 9/3/2024     Reason for Consultation:  Fernanda Starkey was seen today in the Cancer Center for the diagnosis of iron deficiency anemia.    History of Present Illness:     63 year old referred for anemia. On chart review, no to have mild anemia since . Colonoscopy , EGD 10/23. On oral iron a long time.  Bit more fatigue.  No rectal bleeding.    Past Medical History:  Past Medical History:    Anxiety    Arthritis    Cataracts, bilateral    Diabetes mellitus (HCC)    Disorder of liver    fatty liver    Esophageal reflux    Flatulence/gas pain/belching    Hearing loss    High blood pressure    High cholesterol    Hyperlipidemia    Leg swelling    Lumbago    Meningioma (HCC)    Migraines    Obesity    Osteoarthritis    Pain in joints    Prediabetes    Seropositive rheumatoid arthritis (HCC)    Sleep apnea    Sprain of neck    Stress    Unspecified essential hypertension    Unspecified essential hypertension    Vertigo    Vitamin D deficiency    Wears glasses    Weight gain       Past Surgical History:  Past Surgical History:   Procedure Laterality Date    Cataract Bilateral     Colonoscopy  2020    Tubular Adenomas, recheck 5 years    Colonoscopy  10/02/2023    Internal Hemorrhoids. recheck 5 years    Contracept iud      REMOVED    Cyst aspiration right      Breast Lesion - benign cyst    Egd  10/02/2023    Normal, Path benign    Knee replacement surgery Left 2020          Other      boil resection from inner thighs due to virus    Other surgical history      groin abscess bilaterally       Family Medical History:  Family History   Problem Relation Age of Onset    Other (Other) Father         tuberculosis    Other (Coronary  artery disease) Father     Other (TB) Father     Dementia Mother     Hypertension Mother     Breast Cancer Sister 40        estimated age    Other (Coronary artery disease) Brother     No Known Problems Daughter     No Known Problems Son     No Known Problems Son     No Known Problems Son     Diabetes Sister     Arthritis Sister         TKA bilaterally    Diabetes Brother        Psychosocial History:  Social History     Socioeconomic History    Marital status:      Spouse name: Not on file    Number of children: Not on file    Years of education: Not on file    Highest education level: Not on file   Occupational History    Not on file   Tobacco Use    Smoking status: Never     Passive exposure: Never    Smokeless tobacco: Never   Vaping Use    Vaping status: Never Used   Substance and Sexual Activity    Alcohol use: No     Alcohol/week: 0.0 standard drinks of alcohol    Drug use: No    Sexual activity: Not on file   Other Topics Concern     Service Not Asked    Blood Transfusions Not Asked    Caffeine Concern Yes     Comment: 2 cups daily    Occupational Exposure Not Asked    Hobby Hazards Not Asked    Sleep Concern Not Asked    Stress Concern Not Asked    Weight Concern Not Asked    Special Diet Not Asked    Back Care Not Asked    Exercise Yes     Comment: 1x week    Bike Helmet Not Asked    Seat Belt Not Asked    Self-Exams Not Asked   Social History Narrative    ** Merged History Encounter **          Social Determinants of Health     Financial Resource Strain: Not on file   Food Insecurity: Not on file   Transportation Needs: Not on file   Physical Activity: Not on file   Stress: Not on file   Social Connections: Not on file   Housing Stability: Not on file       Allergies:   Allergies   Allergen Reactions    Imitrex [Sumatriptan Succinate] NAUSEA ONLY     bleeding    Imitrex [Sumatriptan] HIVES    Nexium [Esomeprazole] ITCHING    Pollen     Radiology Contrast Iodinated Dyes RASH       Current  Medications:    Current Outpatient Medications:     Tirzepatide (MOUNJARO) 2.5 MG/0.5ML Subcutaneous Solution Pen-injector, Inject 2.5 mg into the skin once a week., Disp: 2 mL, Rfl: 0    gabapentin 300 MG Oral Cap, Take 1 capsule (300 mg total) by mouth nightly., Disp: 90 capsule, Rfl: 1    lisinopril-hydroCHLOROthiazide 10-12.5 MG Oral Tab, Take 1 tablet by mouth daily., Disp: 90 tablet, Rfl: 1    metFORMIN  MG Oral Tablet 24 Hr, Take 1 tablet (500 mg total) by mouth 2 (two) times daily with meals., Disp: 180 tablet, Rfl: 1    rosuvastatin 5 MG Oral Tab, TAKE 1 TABLET BY MOUTH NIGHTLY, Disp: 90 tablet, Rfl: 2    Ferrous Sulfate 325 (65 Fe) MG Oral Tab, Take 1 tablet (325 mg total) by mouth daily with breakfast., Disp: 90 tablet, Rfl: 2    omeprazole 20 MG Oral Capsule Delayed Release, Take 1 capsule (20 mg total) by mouth every morning before breakfast., Disp: 90 capsule, Rfl: 2    meclizine 12.5 MG Oral Tab, 1 p.o. twice daily for 5 days, then twice daily as needed (Patient taking differently: 2 (two) times daily as needed. As needed for dizziness), Disp: 30 tablet, Rfl: 0    Cholecalciferol (VITAMIN D3 OR), Take 5,000 Units by mouth daily., Disp: , Rfl:     diazePAM (VALIUM) 5 MG Oral Tab, Take 1 tablet (5 mg total) by mouth nightly as needed., Disp: 30 tablet, Rfl: 1    Multiple Vitamins-Minerals (PRESERVISION AREDS) Oral Tab, Take by mouth daily., Disp: , Rfl:     loratadine 10 MG Oral Tab, Take 1 tablet (10 mg total) by mouth daily., Disp: , Rfl:     ASPIRIN 81 OR, Take by mouth daily., Disp: , Rfl:     Review of Systems:    Constitutional: No chills, fevers, malaise, night sweats and weight loss.   Eyes: No visual disturbance, irritation and redness.  Ears, nose, mouth, throat, and face: No hearing loss, tinnitus, hoarseness and voice change.  Respiratory: No cough, sputum, hemoptysis, chest pain, wheezing, dyspnea on exertion  Cardiovascular: No chest pain, palpitations, syncope,orthopnea, PND,  edema  Gastrointestinal:No dysphagia, odynophagia, nausea, vomiting, diarrhea, abdominal pain.  Derm: No rash, skin lesions, and pruritus.  Hematologic/lymphatic: No easy bruising, bleeding, and lymphadenopathy.  Musculoskeletal: No myalgias, arthralgias, muscle weakness.  Neurological: No headaches, dizziness, seizures, speech problems, gait problems   Psych: No anxiety/depression.    Vital Signs:  /76 (BP Location: Left arm, Patient Position: Sitting, Cuff Size: adult)   Pulse 97   Temp 97.1 °F (36.2 °C) (Tympanic)   Resp 18   Wt 83.2 kg (183 lb 8 oz)   SpO2 97%   BMI 35.25 kg/m²     ECOG PS: 0    Physical Examination:    General: Patient is alert and oriented x 3, not in acute distress.  Chest: Clear to auscultation.  Heart: Regular rate and rhythm. S1S2 normal.  Extremities: Pedal pulses are present. No edema.  Neurological: Grossly intact.      Labs:         Assessment and Plan:    # Iron deficiency anemia: Likely due to malabsorption.  EGD and colonoscopy normal within the last few years.  On oral iron without much improvement.  Recommend IV iron.  Discussed small risk of infusion reaction.  Labs 12/24 to decide if she needs further IV iron.  Currently on oral, may continue 1 every other day to maintain levels.        Procedures    CBC With Differential With Platelet    Ferritin    Iron And Tibc    Vitamin B12    Folic Acid Serum (Folate)     Return for I.V iron. Labs 3 months.    Josiane Prince M.D.    [facility]    [address] [place], [address]    9/3/2024    1:58 PM

## 2024-09-04 ENCOUNTER — OFFICE VISIT (OUTPATIENT)
Dept: FAMILY MEDICINE CLINIC | Facility: CLINIC | Age: 64
End: 2024-09-04
Payer: COMMERCIAL

## 2024-09-04 VITALS
RESPIRATION RATE: 16 BRPM | HEIGHT: 60.5 IN | BODY MASS INDEX: 35.38 KG/M2 | OXYGEN SATURATION: 99 % | SYSTOLIC BLOOD PRESSURE: 124 MMHG | TEMPERATURE: 98 F | WEIGHT: 185 LBS | HEART RATE: 92 BPM | DIASTOLIC BLOOD PRESSURE: 64 MMHG

## 2024-09-04 DIAGNOSIS — H90.3 SENSORINEURAL HEARING LOSS, BILATERAL: ICD-10-CM

## 2024-09-04 DIAGNOSIS — D50.8 OTHER IRON DEFICIENCY ANEMIA: ICD-10-CM

## 2024-09-04 DIAGNOSIS — E04.2 MULTIPLE THYROID NODULES: ICD-10-CM

## 2024-09-04 DIAGNOSIS — E11.69 TYPE 2 DIABETES MELLITUS WITH OBESITY (HCC): ICD-10-CM

## 2024-09-04 DIAGNOSIS — I10 PRIMARY HYPERTENSION: ICD-10-CM

## 2024-09-04 DIAGNOSIS — Z00.00 ROUTINE PHYSICAL EXAMINATION: Primary | ICD-10-CM

## 2024-09-04 DIAGNOSIS — G47.33 OSA ON CPAP: ICD-10-CM

## 2024-09-04 DIAGNOSIS — E11.9 CONTROLLED TYPE 2 DIABETES MELLITUS WITHOUT COMPLICATION, WITHOUT LONG-TERM CURRENT USE OF INSULIN (HCC): ICD-10-CM

## 2024-09-04 DIAGNOSIS — M17.11 PRIMARY OSTEOARTHRITIS OF RIGHT KNEE: ICD-10-CM

## 2024-09-04 DIAGNOSIS — Z51.81 ENCOUNTER FOR MEDICATION MONITORING: ICD-10-CM

## 2024-09-04 DIAGNOSIS — K21.9 GASTROESOPHAGEAL REFLUX DISEASE WITHOUT ESOPHAGITIS: ICD-10-CM

## 2024-09-04 DIAGNOSIS — Z00.00 LABORATORY EXAMINATION ORDERED AS PART OF A COMPLETE PHYSICAL EXAMINATION: ICD-10-CM

## 2024-09-04 DIAGNOSIS — E78.2 MIXED HYPERLIPIDEMIA: ICD-10-CM

## 2024-09-04 DIAGNOSIS — E66.9 TYPE 2 DIABETES MELLITUS WITH OBESITY (HCC): ICD-10-CM

## 2024-09-04 PROCEDURE — 3074F SYST BP LT 130 MM HG: CPT | Performed by: FAMILY MEDICINE

## 2024-09-04 PROCEDURE — 3044F HG A1C LEVEL LT 7.0%: CPT | Performed by: FAMILY MEDICINE

## 2024-09-04 PROCEDURE — 3078F DIAST BP <80 MM HG: CPT | Performed by: FAMILY MEDICINE

## 2024-09-04 PROCEDURE — 99396 PREV VISIT EST AGE 40-64: CPT | Performed by: FAMILY MEDICINE

## 2024-09-04 PROCEDURE — 3008F BODY MASS INDEX DOCD: CPT | Performed by: FAMILY MEDICINE

## 2024-09-04 NOTE — PROGRESS NOTES
Fernanda Starkey is a 63 year old female.  HPI:  Pt is here for routine physical.  Brings in medication bottles for med review.   Overall doing better.  Taking Gabapentin 300mg night and helps with sleep and with LE numbness/tingling.  Symptoms in feet are much improved now.  No significant pain in lower extremities or in lower back.  Keeps physically active, but limited exercise due to right knee pain.  Taking Tylenol arthritis as needed.  Needs refill of omeprazole, takes daily to control GERD sxs.  Will note flare of symptoms if misses 1 or 2 doses.  Taking Vitamin  D3 5000 units daily and Fe supplement daily.  Saw Hematology yesterday, and to take to get iron infusion and take iron oral supplement every other day..   Seen by weight loss clinic, was prescribed Mounjaro but did not take due to concern for poss SEs.   Wt at home 182-183 pounds.  Trying to eat healthier and plans to implement some regular exercise to help with weight loss.  Normal bowel movements now.  1 time nocturia, stable.  No dysuria.  Occasional WAYNE.  Sleep okay, improved with gabapentin nightly.  No breast complaints  Hearing stable with hearing aids.  Has been having some problems with her hearing aids and seeing audiologist regarding this.  Vision stable.    Current Outpatient Medications   Medication Sig Dispense Refill    omeprazole 20 MG Oral Capsule Delayed Release Take 1 capsule (20 mg total) by mouth every morning before breakfast. 90 capsule 2    gabapentin 300 MG Oral Cap Take 1 capsule (300 mg total) by mouth nightly. 90 capsule 1    lisinopril-hydroCHLOROthiazide 10-12.5 MG Oral Tab Take 1 tablet by mouth daily. 90 tablet 1    metFORMIN  MG Oral Tablet 24 Hr Take 1 tablet (500 mg total) by mouth 2 (two) times daily with meals. 180 tablet 1    rosuvastatin 5 MG Oral Tab TAKE 1 TABLET BY MOUTH NIGHTLY 90 tablet 2    Ferrous Sulfate 325 (65 Fe) MG Oral Tab Take 1 tablet (325 mg total) by mouth daily with breakfast. 90  tablet 2    Cholecalciferol (VITAMIN D3 OR) Take 5,000 Units by mouth daily.      diazePAM (VALIUM) 5 MG Oral Tab Take 1 tablet (5 mg total) by mouth nightly as needed. 30 tablet 1    Multiple Vitamins-Minerals (PRESERVISION AREDS) Oral Tab Take by mouth daily.      loratadine 10 MG Oral Tab Take 1 tablet (10 mg total) by mouth daily.           Past Medical History:    Anxiety    Arthritis    Cataracts, bilateral    Diabetes mellitus (HCC)    Disorder of liver    fatty liver    Esophageal reflux    Flatulence/gas pain/belching    Hearing loss    High blood pressure    High cholesterol    Hyperlipidemia    Leg swelling    Lumbago    Meningioma (HCC)    Migraines    Obesity    Osteoarthritis    Pain in joints    Prediabetes    Seropositive rheumatoid arthritis (HCC)    Sleep apnea    Sprain of neck    Stress    Unspecified essential hypertension    Unspecified essential hypertension    Vertigo    Vitamin D deficiency    Wears glasses    Weight gain       Past Surgical History:   Procedure Laterality Date    Cataract Bilateral     Colonoscopy  2020    Tubular Adenomas, recheck 5 years    Colonoscopy  10/02/2023    Internal Hemorrhoids. recheck 5 years    Contracept iud      REMOVED    Cyst aspiration right      Breast Lesion - benign cyst    Egd  10/02/2023    Normal, Path benign    Knee replacement surgery Left 2020          Other      boil resection from inner thighs due to virus    Other surgical history      groin abscess bilaterally         Social History     Socioeconomic History    Marital status:    Tobacco Use    Smoking status: Never     Passive exposure: Never    Smokeless tobacco: Never   Vaping Use    Vaping status: Never Used   Substance and Sexual Activity    Alcohol use: No     Alcohol/week: 0.0 standard drinks of alcohol    Drug use: No   Other Topics Concern    Caffeine Concern Yes     Comment: 2 cups daily    Exercise Yes     Comment: 1x week   Social History Narrative     ** Merged History Encounter **                        REVIEW OF SYSTEMS:  GENERAL HEALTH: feels well otherwise, mood is good  SKIN: denies any unusual skin lesions or rashes  RESPIRATORY: denies shortness of breath with exertion, no cough  CARDIOVASCULAR: denies chest pain on exertion  GI: denies abdominal pain   : As above  NEURO: denies headaches, denies dizziness    EXAM:  /64   Pulse 92   Temp 97.8 °F (36.6 °C) (Temporal)   Resp 16   Ht 5' 0.5\" (1.537 m)   Wt 185 lb (83.9 kg)   SpO2 99%   BMI 35.54 kg/m²   Wt Readings from Last 6 Encounters:   09/04/24 185 lb (83.9 kg)   09/03/24 183 lb 8 oz (83.2 kg)   08/21/24 189 lb (85.7 kg)   08/05/24 186 lb 1.6 oz (84.4 kg)   08/02/24 188 lb (85.3 kg)   07/01/24 184 lb 6.4 oz (83.6 kg)     GENERAL: well developed, well nourished,in no apparent distress, pleasant affect  SKIN: no rashes,no suspicious lesions  Right nasal bridge with hyperpigmented nodular soft nevus, about 5-6 mm size, well-demarcated, stable versus previous  Multiple scattered benign-appearing hyperpigmented nevi  HEENT: atraumatic, normocephalic,  Conj clear, EOMI, PERRL  TMs: No erythema, some scarring noted on TMs bilaterally, left greater than right.  No drainage  OMM, throat clear  NECK: supple,no adenopathy,mild TM, no palapable nodules  LUNGS: clear to auscultation  CARDIO: RRR without murmur  GI: NABS, soft, obese, no tenderness, no masses, no HSM, ND  EXTREMITIES: no cyanosis, clubbing or edema  Right knee with medial joint line tenderness.  Good range of motion.  NEURO: A&O x3, no focal deficits  Normal gaitBreast and pelvic exams deferred per patient.  ASSESSMENT AND PLAN:    1. Routine physical examination  Reviewed diet/exercise/skin care/safety/BSE/BMI goals  Reviewed all medications.   Reviewed immunizations: Tdap up-to-date.  Had pneumococcal vaccine and Shingrix vaccine series.  Advised annual flu vaccine seasonally.  Discussed COVID-19 booster dose and RSV vaccine  PAP  smear Up-to-date.  Last done 7/2023, normal, HPV negative  MMG  Up-to-date.  Last done 6/2024, WNL.  Has family history of breast cancer.  Discussed option for genetic counseling/testing, patient defers.  Colonoscopy up-to-date    2. Laboratory examination ordered as part of a complete physical examination  - Comp Metabolic Panel (14); Future  - Lipid Panel; Future  - TSH W Reflex To Free T4; Future  - Hemoglobin A1C; Future    3. Gastroesophageal reflux disease without esophagitis  Stable with avoidance of dietary triggers and medications.  Continue omeprazole daily.  Tolerating medication without side effects.  Medication refilled.    4. Primary hypertension  Blood pressure controlled.  CPM    5. Controlled type 2 diabetes mellitus without complication, without long-term current use of insulin (HCC)  6. Type 2 diabetes mellitus with obesity (HCC)  Reviewed diet and exercise.  Noted patient seen by weight loss clinic.  Prescribed Mounjaro weekly.  Advised would recommend patient starting medication.  Reviewed indication and benefits/possible side effects.  Weight loss will also help with fatty liver/OA/JOSÉ LUIS.  Continue metformin twice daily  Cont Gabapentin, neuropathy improved.   - Comp Metabolic Panel (14); Future  - Hemoglobin A1C; Future    7. Sensorineural hearing loss, bilateral  Has hearing aids.  Follow-up with audiologist for some problems she is having with hearing aids.      8. JOSÉ LUIS on CPAP  Regular CPAP use  Wt loss    9. Multiple thyroid nodules  - TSH W Reflex To Free T4; Future  - US THYROID (CPT=76536); Future    10. Mixed hyperlipidemia  Reviewed diet and exercise.  Monitor lipids and LFTs.  Continue statin.  - Lipid Panel; Future    11. Primary osteoarthritis of right knee  HEP.  Has f/u with Ortho  Topical analgesics and Tylenol Arthritis prn.     12.  Other iron deficiency anemia   Noted consult and recommendations per hematologist.  GI workup negative.  Clinically due to malabsorption.  To get  iron infusion.  Continue current supplement for now and then can switch to every other day for maintenance.     13.  Encounter for medication monitoring  - Comp Metabolic Panel (14); Future  - Lipid Panel; Future  - Hemoglobin A1C; Future

## 2024-09-10 ENCOUNTER — OFFICE VISIT (OUTPATIENT)
Dept: HEMATOLOGY/ONCOLOGY | Age: 64
End: 2024-09-10
Attending: INTERNAL MEDICINE
Payer: COMMERCIAL

## 2024-09-10 VITALS
HEART RATE: 75 BPM | HEIGHT: 60.51 IN | OXYGEN SATURATION: 98 % | DIASTOLIC BLOOD PRESSURE: 72 MMHG | RESPIRATION RATE: 14 BRPM | WEIGHT: 188 LBS | TEMPERATURE: 98 F | SYSTOLIC BLOOD PRESSURE: 132 MMHG | BODY MASS INDEX: 35.96 KG/M2

## 2024-09-10 DIAGNOSIS — D50.9 IRON DEFICIENCY ANEMIA, UNSPECIFIED IRON DEFICIENCY ANEMIA TYPE: Primary | ICD-10-CM

## 2024-09-10 PROCEDURE — 96365 THER/PROPH/DIAG IV INF INIT: CPT

## 2024-09-10 NOTE — PROGRESS NOTES
Education Record    Learner:  Patient    Pt here for: infed    Barriers / Limitations:  None    Method:  Brief focused, printed material and  reinforcement    General Topics:  Plan of care reviewed    Outcome: Pt tolerated infusion with no c/o.

## 2024-09-11 DIAGNOSIS — E66.01 CLASS 2 SEVERE OBESITY WITH SERIOUS COMORBIDITY AND BODY MASS INDEX (BMI) OF 36.0 TO 36.9 IN ADULT, UNSPECIFIED OBESITY TYPE (HCC): ICD-10-CM

## 2024-09-11 DIAGNOSIS — G47.33 OSA ON CPAP: ICD-10-CM

## 2024-09-11 DIAGNOSIS — Z51.81 ENCOUNTER FOR THERAPEUTIC DRUG LEVEL MONITORING: ICD-10-CM

## 2024-09-11 DIAGNOSIS — K76.0 FATTY LIVER: ICD-10-CM

## 2024-09-11 DIAGNOSIS — E66.812 CLASS 2 SEVERE OBESITY WITH SERIOUS COMORBIDITY AND BODY MASS INDEX (BMI) OF 36.0 TO 36.9 IN ADULT, UNSPECIFIED OBESITY TYPE (HCC): ICD-10-CM

## 2024-09-11 DIAGNOSIS — E78.2 MIXED HYPERLIPIDEMIA: ICD-10-CM

## 2024-09-11 DIAGNOSIS — I10 PRIMARY HYPERTENSION: ICD-10-CM

## 2024-09-11 DIAGNOSIS — E11.9 CONTROLLED TYPE 2 DIABETES MELLITUS WITHOUT COMPLICATION, WITHOUT LONG-TERM CURRENT USE OF INSULIN (HCC): ICD-10-CM

## 2024-09-18 ENCOUNTER — HOSPITAL ENCOUNTER (OUTPATIENT)
Dept: ULTRASOUND IMAGING | Age: 64
Discharge: HOME | End: 2024-09-18
Attending: FAMILY MEDICINE
Payer: COMMERCIAL

## 2024-09-18 DIAGNOSIS — E04.2 MULTIPLE THYROID NODULES: ICD-10-CM

## 2024-09-18 PROCEDURE — 76536 US EXAM OF HEAD AND NECK: CPT | Performed by: FAMILY MEDICINE

## 2024-09-19 ENCOUNTER — TELEPHONE (OUTPATIENT)
Dept: FAMILY MEDICINE CLINIC | Facility: CLINIC | Age: 64
End: 2024-09-19

## 2024-09-19 DIAGNOSIS — G47.33 OSA ON CPAP: Primary | ICD-10-CM

## 2024-09-19 NOTE — TELEPHONE ENCOUNTER
FYI:    Fernanda Starkey (spoke to patient's spouse)  Called for a referral for a NEW concern of pulmonology .     Patient denied appointment with primary.  Routing to triage for assistance    Patient requesting referral to [facility]   Specialty: Pulmonary  Does patient have a scheduled appointment?: patient has pending appointment on Wednesday, September 25, but requires referral prior to the appointment.  Patient needs equipment for C-Pap

## 2024-09-23 ENCOUNTER — LAB ENCOUNTER (OUTPATIENT)
Dept: LAB | Age: 64
End: 2024-09-23
Attending: FAMILY MEDICINE
Payer: COMMERCIAL

## 2024-09-23 DIAGNOSIS — Z51.81 ENCOUNTER FOR MEDICATION MONITORING: ICD-10-CM

## 2024-09-23 DIAGNOSIS — E11.9 CONTROLLED TYPE 2 DIABETES MELLITUS WITHOUT COMPLICATION, WITHOUT LONG-TERM CURRENT USE OF INSULIN (HCC): ICD-10-CM

## 2024-09-23 DIAGNOSIS — Z00.00 LABORATORY EXAMINATION ORDERED AS PART OF A COMPLETE PHYSICAL EXAMINATION: ICD-10-CM

## 2024-09-23 DIAGNOSIS — E11.69 TYPE 2 DIABETES MELLITUS WITH OBESITY (HCC): ICD-10-CM

## 2024-09-23 DIAGNOSIS — E66.9 TYPE 2 DIABETES MELLITUS WITH OBESITY (HCC): ICD-10-CM

## 2024-09-23 DIAGNOSIS — E04.2 MULTIPLE THYROID NODULES: ICD-10-CM

## 2024-09-23 DIAGNOSIS — E78.2 MIXED HYPERLIPIDEMIA: ICD-10-CM

## 2024-09-23 LAB
ALBUMIN SERPL-MCNC: 4.9 G/DL (ref 3.2–4.8)
ALBUMIN/GLOB SERPL: 1.9 {RATIO} (ref 1–2)
ALP LIVER SERPL-CCNC: 87 U/L
ALT SERPL-CCNC: 52 U/L
ANION GAP SERPL CALC-SCNC: 7 MMOL/L (ref 0–18)
AST SERPL-CCNC: 26 U/L (ref ?–34)
BILIRUB SERPL-MCNC: 0.6 MG/DL (ref 0.2–1.1)
BUN BLD-MCNC: 23 MG/DL (ref 9–23)
CALCIUM BLD-MCNC: 10.4 MG/DL (ref 8.7–10.4)
CHLORIDE SERPL-SCNC: 106 MMOL/L (ref 98–112)
CHOLEST SERPL-MCNC: 241 MG/DL (ref ?–200)
CO2 SERPL-SCNC: 28 MMOL/L (ref 21–32)
CREAT BLD-MCNC: 0.92 MG/DL
EGFRCR SERPLBLD CKD-EPI 2021: 70 ML/MIN/1.73M2 (ref 60–?)
EST. AVERAGE GLUCOSE BLD GHB EST-MCNC: 134 MG/DL (ref 68–126)
FASTING PATIENT LIPID ANSWER: YES
FASTING STATUS PATIENT QL REPORTED: YES
GLOBULIN PLAS-MCNC: 2.6 G/DL (ref 2–3.5)
GLUCOSE BLD-MCNC: 128 MG/DL (ref 70–99)
HBA1C MFR BLD: 6.3 % (ref ?–5.7)
HDLC SERPL-MCNC: 78 MG/DL (ref 40–59)
LDLC SERPL CALC-MCNC: 136 MG/DL (ref ?–100)
NONHDLC SERPL-MCNC: 163 MG/DL (ref ?–130)
OSMOLALITY SERPL CALC.SUM OF ELEC: 297 MOSM/KG (ref 275–295)
POTASSIUM SERPL-SCNC: 4.5 MMOL/L (ref 3.5–5.1)
PROT SERPL-MCNC: 7.5 G/DL (ref 5.7–8.2)
SODIUM SERPL-SCNC: 141 MMOL/L (ref 136–145)
TRIGL SERPL-MCNC: 155 MG/DL (ref 30–149)
TSI SER-ACNC: 2.38 MIU/ML (ref 0.55–4.78)
VLDLC SERPL CALC-MCNC: 28 MG/DL (ref 0–30)

## 2024-09-23 PROCEDURE — 80053 COMPREHEN METABOLIC PANEL: CPT

## 2024-09-23 PROCEDURE — 84443 ASSAY THYROID STIM HORMONE: CPT

## 2024-09-23 PROCEDURE — 80061 LIPID PANEL: CPT

## 2024-09-23 PROCEDURE — 83036 HEMOGLOBIN GLYCOSYLATED A1C: CPT

## 2024-09-23 PROCEDURE — 36415 COLL VENOUS BLD VENIPUNCTURE: CPT

## 2024-09-30 ENCOUNTER — OFFICE VISIT (OUTPATIENT)
Facility: CLINIC | Age: 64
End: 2024-09-30
Payer: COMMERCIAL

## 2024-09-30 VITALS — BODY MASS INDEX: 36.71 KG/M2 | WEIGHT: 187 LBS | HEIGHT: 60 IN

## 2024-09-30 DIAGNOSIS — M17.11 PRIMARY OSTEOARTHRITIS OF RIGHT KNEE: Primary | ICD-10-CM

## 2024-09-30 PROCEDURE — 99213 OFFICE O/P EST LOW 20 MIN: CPT | Performed by: ORTHOPAEDIC SURGERY

## 2024-09-30 PROCEDURE — 20610 DRAIN/INJ JOINT/BURSA W/O US: CPT | Performed by: ORTHOPAEDIC SURGERY

## 2024-09-30 PROCEDURE — 3008F BODY MASS INDEX DOCD: CPT | Performed by: ORTHOPAEDIC SURGERY

## 2024-09-30 RX ORDER — TRIAMCINOLONE ACETONIDE 40 MG/ML
40 INJECTION, SUSPENSION INTRA-ARTICULAR; INTRAMUSCULAR ONCE
Status: COMPLETED | OUTPATIENT
Start: 2024-09-30 | End: 2024-09-30

## 2024-09-30 RX ADMIN — TRIAMCINOLONE ACETONIDE 40 MG: 40 INJECTION, SUSPENSION INTRA-ARTICULAR; INTRAMUSCULAR at 14:27:00

## 2024-09-30 NOTE — PROGRESS NOTES
[facility] Progress Note    Subjective: Patient returns for her right knee.  They state that she had the procedure by Dr. Ramos and this did not provide relief of her knee pain symptoms.  She is more concerned with the right knee, but feels knee replacement is not something she wants to consider given her previous experience with the left knee.    Objective: Patient appears comfortable.  Able to fully extend and flex the knee with minimal discomfort.  Minimal to no discomfort to palpation about the joint line.      Reports: Dr. Ramos procedure notes reviewed.  Left knee genicular nerve block February 1, 2024, right knee joint injection with Synvisc March 7, 2024.      Assessment/Plan: 63-year-old female with symptomatic radiographically severe right knee osteoarthritis.  Revisited discussion of treatment options.  They had questions about further nonsurgical treatment, as knee replacement is not something she wants to consider given her previous experience with the left knee.  Counseled that from a nonsurgical standpoint we are limited in what we can do, and she really has attempted all nonsurgical treatments.  They are asking about medications which I counseled them on.  They would like to reattempt a steroid injection, and I did perform this in clinic today.  Advised this can be repeated as often as every 3 months as needed.    Doug Wilson MD, FAAOS  [facility] Orthopaedic Surgery  Phone [phone]  Fax [phone]

## 2024-09-30 NOTE — PROCEDURES
Risks and benefits of knee injection discussed with the patient, with risks including but not limited to pain and swelling at the injection site and/or within the knee joint, infection, elevation in blood pressure and/or glucose levels, facial flushing.  After informed consent, the patient's right knee was marked, locally anesthetized with skin refrigerant, prepped with topical antiseptic, and injected with a mixture of 1mL 40mg/mL Kenalog, 2mL 1% lidocaine and 2mL 0.5% marcaine through the inferolateral portal.  A band-aid was applied.  The patient tolerated the procedure well.    Doug Wilson MD, FAAOS  [facility] Orthopedic Surgery  Phone [phone]  Fax [phone]

## 2024-10-25 ENCOUNTER — TELEPHONE (OUTPATIENT)
Dept: FAMILY MEDICINE CLINIC | Facility: CLINIC | Age: 64
End: 2024-10-25

## 2024-10-25 DIAGNOSIS — E11.9 CONTROLLED TYPE 2 DIABETES MELLITUS WITHOUT COMPLICATION, WITHOUT LONG-TERM CURRENT USE OF INSULIN (HCC): ICD-10-CM

## 2024-10-25 NOTE — TELEPHONE ENCOUNTER
LOV 9/4/24  Seen by [facility] 9/25/24     Received call from spouse Crispin. He is looking for update on CPAP order. States insurance is denying the CPAP and states he needs a referral from PCP. Appears pt is seeing pulm and pulm has ordered supplies. Notified pt I will need to contact pulm to see what the issue is.     Called [facility], spoke with Yaniv. She took a message for the nurses and will have a nurse call back regarding CPAP. Will await call back.

## 2024-10-28 RX ORDER — METFORMIN HYDROCHLORIDE 500 MG/1
500 TABLET, EXTENDED RELEASE ORAL 2 TIMES DAILY WITH MEALS
Qty: 180 TABLET | Refills: 3 | Status: SHIPPED | OUTPATIENT
Start: 2024-10-28

## 2024-10-28 NOTE — TELEPHONE ENCOUNTER
Patients spouse calling stating he recently spoke with someone in the office or nurse earlier. Stated that per insurance CPAP orders need to come from PCP as they are through [facility] and cannot come from pulm.     Can we have  order supplies. Please advise

## 2024-10-28 NOTE — TELEPHONE ENCOUNTER
Refill Per Protocol     Requested Prescriptions   Pending Prescriptions Disp Refills    METFORMIN  MG Oral Tablet 24 Hr [Pharmacy Med Name: metFORMIN HCl ER Oral Tablet Extended Release 24 Hour 500 MG] 180 tablet 0     Sig: TAKE 1 TABLET BY MOUTH 2 TIMES A DAY WITH MEALS       Diabetes Medication Protocol Passed - 10/28/2024  2:05 PM        Passed - Last A1C < 7.5 and within past 6 months     Lab Results   Component Value Date    A1C 6.3 (H) 09/23/2024             Passed - In person appointment or virtual visit in the past 6 mos or appointment in next 3 mos     Recent Outpatient Visits              4 weeks ago Primary osteoarthritis of right knee    [facility], [address] [place] [address] Doug Wilson MD    Office Visit    1 month ago Iron deficiency anemia, unspecified iron deficiency anemia type    [facility] in [place]    Office Visit    1 month ago Routine physical examination    [address] Inocencio Lucas MD    Office Visit    1 month ago Iron deficiency anemia, unspecified iron deficiency anemia type    [facility] in [place] Deb Prince MD    Office Visit    2 months ago Encounter for therapeutic drug level monitoring    [address] Cristina Farmer PA-C    Office Visit          Future Appointments         Provider Department Appt Notes    In 4 days Inocencio Lucas MD [address] review lab results    In 1 month PF OT [facility] in [place] [address]    In 2 months Cristina Farmer PA-C [address] Follow Up                    Passed - Microalbumin procedure in past 12 months or taking ACE/ARB        Passed - EGFRCR or GFRNAA > 50     GFR Evaluation  EGFRCR: 70 , resulted on 9/23/2024          Passed - GFR in the past 12 months               Future  Appointments         Provider Department Appt Notes    In 4 days Inocencio Lucas MD [facility], [address] [place] [address] review lab results    In 1 month PF OT [facility] in [place] [address]    In 2 months Cristina Farmer PA-C [facility], [address] [place] [address] Follow Up          Recent Outpatient Visits              4 weeks ago Primary osteoarthritis of right knee    [facility], [address] [place] [address] Doug Wilson MD    Office Visit    1 month ago Iron deficiency anemia, unspecified iron deficiency anemia type    [facility] in [place]    Office Visit    1 month ago Routine physical examination    [address] Inocencio Lucas MD    Office Visit    1 month ago Iron deficiency anemia, unspecified iron deficiency anemia type    [facility] in [place] Deb Prince MD    Office Visit    2 months ago Encounter for therapeutic drug level monitoring    [facility], [address] [place] [address] Cristina Farmer PA-C    Office Visit

## 2024-10-28 NOTE — TELEPHONE ENCOUNTER
Confirmed with [facility] care, CPAP supply order does have to come from PCP office, however we no longer obtain authorization for DME items so approved for tracking purposes only.     Call to [facility] and spoke with Rea, she states DME orders would be handled by Pulm as treating provider. Advised per patients insurance plan, and patient's spouse, order needs to come from PCP. Asked for list to be provided with specific supplies needed for this patient. She will send message to nurses.

## 2024-10-28 NOTE — TELEPHONE ENCOUNTER
As pt just had appt with pulmonary last month, prefer to avoid new pulm referral. I do not routinely order CPAP supplies but if we can get exactly what pt needs from [facility], we can sign order to be processed through our office.

## 2024-11-01 ENCOUNTER — OFFICE VISIT (OUTPATIENT)
Dept: FAMILY MEDICINE CLINIC | Facility: CLINIC | Age: 64
End: 2024-11-01
Payer: COMMERCIAL

## 2024-11-01 VITALS
TEMPERATURE: 98 F | OXYGEN SATURATION: 98 % | WEIGHT: 192.25 LBS | RESPIRATION RATE: 16 BRPM | HEART RATE: 85 BPM | DIASTOLIC BLOOD PRESSURE: 70 MMHG | HEIGHT: 60.51 IN | SYSTOLIC BLOOD PRESSURE: 120 MMHG | BODY MASS INDEX: 36.77 KG/M2

## 2024-11-01 DIAGNOSIS — H90.3 SENSORINEURAL HEARING LOSS, BILATERAL: ICD-10-CM

## 2024-11-01 DIAGNOSIS — K76.0 FATTY LIVER: ICD-10-CM

## 2024-11-01 DIAGNOSIS — D50.8 OTHER IRON DEFICIENCY ANEMIA: ICD-10-CM

## 2024-11-01 DIAGNOSIS — G47.33 OSA ON CPAP: ICD-10-CM

## 2024-11-01 DIAGNOSIS — Z51.81 ENCOUNTER FOR MEDICATION MONITORING: ICD-10-CM

## 2024-11-01 DIAGNOSIS — E78.2 MIXED HYPERLIPIDEMIA: Primary | ICD-10-CM

## 2024-11-01 DIAGNOSIS — E11.9 CONTROLLED TYPE 2 DIABETES MELLITUS WITHOUT COMPLICATION, WITHOUT LONG-TERM CURRENT USE OF INSULIN (HCC): ICD-10-CM

## 2024-11-01 DIAGNOSIS — I10 PRIMARY HYPERTENSION: ICD-10-CM

## 2024-11-01 DIAGNOSIS — E11.69 TYPE 2 DIABETES MELLITUS WITH OBESITY (HCC): ICD-10-CM

## 2024-11-01 DIAGNOSIS — E04.2 MULTIPLE THYROID NODULES: ICD-10-CM

## 2024-11-01 DIAGNOSIS — K21.9 GASTROESOPHAGEAL REFLUX DISEASE WITHOUT ESOPHAGITIS: ICD-10-CM

## 2024-11-01 DIAGNOSIS — M47.26 OSTEOARTHRITIS OF SPINE WITH RADICULOPATHY, LUMBAR REGION: ICD-10-CM

## 2024-11-01 DIAGNOSIS — E66.9 TYPE 2 DIABETES MELLITUS WITH OBESITY (HCC): ICD-10-CM

## 2024-11-01 DIAGNOSIS — M17.11 PRIMARY OSTEOARTHRITIS OF RIGHT KNEE: ICD-10-CM

## 2024-11-01 DIAGNOSIS — Z23 NEED FOR VACCINATION: ICD-10-CM

## 2024-11-01 PROCEDURE — 3008F BODY MASS INDEX DOCD: CPT | Performed by: FAMILY MEDICINE

## 2024-11-01 PROCEDURE — 90656 IIV3 VACC NO PRSV 0.5 ML IM: CPT | Performed by: FAMILY MEDICINE

## 2024-11-01 PROCEDURE — 3078F DIAST BP <80 MM HG: CPT | Performed by: FAMILY MEDICINE

## 2024-11-01 PROCEDURE — 3044F HG A1C LEVEL LT 7.0%: CPT | Performed by: FAMILY MEDICINE

## 2024-11-01 PROCEDURE — 90471 IMMUNIZATION ADMIN: CPT | Performed by: FAMILY MEDICINE

## 2024-11-01 PROCEDURE — 99215 OFFICE O/P EST HI 40 MIN: CPT | Performed by: FAMILY MEDICINE

## 2024-11-01 PROCEDURE — 3074F SYST BP LT 130 MM HG: CPT | Performed by: FAMILY MEDICINE

## 2024-11-01 RX ORDER — ROSUVASTATIN CALCIUM 5 MG/1
5 TABLET, COATED ORAL NIGHTLY
Qty: 90 TABLET | Refills: 2 | Status: SHIPPED | OUTPATIENT
Start: 2024-11-01

## 2024-11-01 NOTE — PROGRESS NOTES
Fernanda Starkey is a 63 year old female.  HPI:  Pt is here for f/u on test results and medication.   Has been eating healthy.  No regular exercise, but stays physically active with housework.  Some weight gain.  No more pain or tingling in feet with Gabapentin . No flare of sciatica.   Saw orthopedic specialist for right knee pain.  Has severe OA on imaging.  Had intra articular steroid injection with improvement.  Prefers to avoid surgery.  GERD symptoms controlled with omeprazole daily.  Sometimes will take in p.m. as well if gets flareup due to dietary triggers.  Currently not taking iron supplement.  Did have iron infusion.  No unusual fatigue.  Using CPAP regularly. Has order to get some new supplies as current ones are very old.  Some trouble with hearing aids. To get serviced with audiologist.    Current Outpatient Medications   Medication Sig Dispense Refill    rosuvastatin 5 MG Oral Tab Take 1 tablet (5 mg total) by mouth nightly. 90 tablet 2    metFORMIN  MG Oral Tablet 24 Hr Take 1 tablet (500 mg total) by mouth 2 (two) times daily with meals. 180 tablet 3    omeprazole 20 MG Oral Capsule Delayed Release Take 1 capsule (20 mg total) by mouth every morning before breakfast. 90 capsule 2    gabapentin 300 MG Oral Cap Take 1 capsule (300 mg total) by mouth nightly. 90 capsule 1    lisinopril-hydroCHLOROthiazide 10-12.5 MG Oral Tab Take 1 tablet by mouth daily. 90 tablet 1           Cholecalciferol (VITAMIN D3 OR) Take 5,000 Units by mouth daily.      loratadine 10 MG Oral Tab Take 1 tablet (10 mg total) by mouth daily.                  Past Medical History:    Anxiety    Arthritis    Cataracts, bilateral    Diabetes mellitus (HCC)    Disorder of liver    fatty liver    Esophageal reflux    Flatulence/gas pain/belching    Hearing loss    High blood pressure    High cholesterol    Hyperlipidemia    Leg swelling    Lumbago    Meningioma (HCC)    Migraines    Obesity    Osteoarthritis    Pain in  joints    Prediabetes    Seropositive rheumatoid arthritis (HCC)    Sleep apnea    Sprain of neck    Stress    Unspecified essential hypertension    Unspecified essential hypertension    Vertigo    Vitamin D deficiency    Wears glasses    Weight gain       Past Surgical History:   Procedure Laterality Date    Cataract Bilateral     Colonoscopy  2020    Tubular Adenomas, recheck 5 years    Colonoscopy  10/02/2023    Internal Hemorrhoids. recheck 5 years    Contracept iud      REMOVED    Cyst aspiration right      Breast Lesion - benign cyst    Egd  10/02/2023    Normal, Path benign    Knee replacement surgery Left 2020          Other      boil resection from inner thighs due to virus    Other surgical history      groin abscess bilaterally         Social History     Socioeconomic History    Marital status:    Tobacco Use    Smoking status: Never     Passive exposure: Never    Smokeless tobacco: Never    Tobacco comments:     Updated 24   Vaping Use    Vaping status: Never Used   Substance and Sexual Activity    Alcohol use: No     Alcohol/week: 0.0 standard drinks of alcohol    Drug use: No   Other Topics Concern    Caffeine Concern Yes     Comment: 2 cups daily    Exercise Yes     Comment: 1x week   Social History Narrative    ** Merged History Encounter **                        REVIEW OF SYSTEMS:  GENERAL HEALTH: feels well otherwise, mood is good  SKIN: denies any unusual skin lesions or rashes  RESPIRATORY: denies shortness of breath with exertion, no cough  CARDIOVASCULAR: denies chest pain on exertion  GI: As above  : normal bladder  NEURO: denies headaches, denies dizziness    EXAM:  /70   Pulse 85   Temp 97.9 °F (36.6 °C) (Temporal)   Resp 16   Ht 5' 0.51\" (1.537 m)   Wt 192 lb 4 oz (87.2 kg)   SpO2 98%   BMI 36.92 kg/m²   Wt Readings from Last 6 Encounters:   24 192 lb 4 oz (87.2 kg)   24 187 lb (84.8 kg)   09/10/24 188 lb (85.3 kg)   24 185  lb (83.9 kg)   09/03/24 183 lb 8 oz (83.2 kg)   08/21/24 189 lb (85.7 kg)     GENERAL: well developed, well nourished,in no apparent distress, pleasant affect  HEENT: atraumatic, normocephalic,  Conj clear  NECK: supple,no adenopathy,noTM  LUNGS: clear to auscultation  CARDIO: RRR without murmur  GI: NABS, obese, soft, no tenderness, no masses, no HSM, ND  EXTREMITIES: no cyanosis, clubbing or edema  R knee with mild med jt line tenderness, no swelling, FROM  NEURO: A&O x3, no focal deficits  Normal gait    ASSESSMENT AND PLAN:    1. Mixed hyperlipidemia  Reviewed lipid panel with elevated , significantly higher than previous.  HDL high.  Triglycerides slightly elevated at 155.    LFTs with slightly elevated ALT.  AST normal.  Monitor LFTs.  Patient may have not been as regular with taking rosuvastatin.  Reviewed diet/exercise/lipid goals.  Advised to take rosuvastatin 5 mg nightly on a regular basis.  Prefer to avoid increasing dose.  Monitor.    - rosuvastatin 5 MG Oral Tab; Take 1 tablet (5 mg total) by mouth nightly.  Dispense: 90 tablet; Refill: 2  - Comp Metabolic Panel (14); Future  - Lipid Panel; Future    2. Primary hypertension  Blood pressure controlled.  Electrolytes normal.  CPM.  - Comp Metabolic Panel (14); Future    3. Controlled type 2 diabetes mellitus without complication, without long-term current use of insulin (HCC)  4. Type 2 diabetes mellitus with obesity (HCC)  Fasting blood sugar 128, hemoglobin A1c 6.3, significantly improved compared to previous.  Diabetes well controlled.  Reviewed diet and exercise.  Continue current dose of metformin ER.  Advised to start Mounjaro as previously prescribed per weight loss clinic.  Reviewed indication/benefits/possible side effects.  Patient is somewhat concerned about starting a weekly injection, but will consider.  Reviewed footcare.  Continue ACE inhibitor and statin.  TSH normal  - Comp Metabolic Panel (14); Future  - Hemoglobin A1C; Future  -  Microalb/Creat Ratio, Random Urine; Future    5. Fatty liver  Reviewed diet/exercise/weight loss.  ALT elevated at 52.  AST normal.  Advised on importance of weight loss.  Has seen GI.  Had FibroScan done August 2024 with findings suggestive of mild fibrosis.  Monitor.  - Comp Metabolic Panel (14); Future    6. JOSÉ LUIS on CPAP  Has seen Pulm  To get new CPAP supplies  Advised on regular use of CPAP    7. Sensorineural hearing loss, bilateral  Has hearing aids.  To get servicing with audiology.    8. Gastroesophageal reflux disease without esophagitis  Avoid possible dietary triggers.  Avoid NSAIDs.  Clinically doing well    9. Primary osteoarthritis of right knee  Noted had orthopedic evaluation.  Patient with radiographically severe OA, prefers to avoid surgery.  received intra-articular steroid injection with improvement.  Has history of Synvisc injection done March 2024.  HEP.  Tylenol arthritis every 8 hours as needed.  Topical analgesics as needed.    10. Osteoarthritis of spine with radiculopathy, lumbar region  Clinically much improved.  Radicular symptoms essentially resolved now.  Gabapentin has helped.  Continue current dose of gabapentin  HEP    11.  Multiple thyroid nodules  Reviewed ultrasound.  No nodule seen.  Previously noted subcentimeter nodules have resolved.  TSH normal    12.  Other iron deficiency anemia   Hemoglobin slightly low at 11.4, normocytic.  Essentially stable.  Ferritin level normal.  Ok to restart Fe supplement and take every other day.  Had had Fe infusion per Heme  To have recheck labs ordered per hematologist in December 2024    13.  Need for vaccination  - INFLUENZA VACCINE, TRI, PRESERV FREE, 0.5 ML    14. Encounter for medication monitoring  - Comp Metabolic Panel (14); Future  - Lipid Panel; Future  - Hemoglobin A1C; Future

## 2024-11-04 NOTE — TELEPHONE ENCOUNTER
Patient's spouse called asking for the below order to be faxed to the Medical suppl[employer] company at [phone] Att: Elaina    Wants it done as soon as possible.

## 2024-12-03 ENCOUNTER — NURSE ONLY (OUTPATIENT)
Dept: HEMATOLOGY/ONCOLOGY | Age: 64
End: 2024-12-03
Attending: INTERNAL MEDICINE
Payer: COMMERCIAL

## 2024-12-03 DIAGNOSIS — D64.9 ANEMIA, UNSPECIFIED TYPE: ICD-10-CM

## 2024-12-03 DIAGNOSIS — D50.9 IRON DEFICIENCY ANEMIA, UNSPECIFIED IRON DEFICIENCY ANEMIA TYPE: ICD-10-CM

## 2024-12-03 LAB
BASOPHILS # BLD AUTO: 0.05 X10(3) UL (ref 0–0.2)
BASOPHILS NFR BLD AUTO: 0.6 %
DEPRECATED HBV CORE AB SER IA-ACNC: 234 NG/ML
EOSINOPHIL # BLD AUTO: 0.23 X10(3) UL (ref 0–0.7)
EOSINOPHIL NFR BLD AUTO: 2.7 %
ERYTHROCYTE [DISTWIDTH] IN BLOOD BY AUTOMATED COUNT: 13.9 %
FOLATE SERPL-MCNC: 13.8 NG/ML (ref 5.4–?)
HCT VFR BLD AUTO: 37.2 %
HGB BLD-MCNC: 12.3 G/DL
IMM GRANULOCYTES # BLD AUTO: 0.06 X10(3) UL (ref 0–1)
IMM GRANULOCYTES NFR BLD: 0.7 %
IRON SATN MFR SERPL: 25 %
IRON SERPL-MCNC: 84 UG/DL
LYMPHOCYTES # BLD AUTO: 2.45 X10(3) UL (ref 1–4)
LYMPHOCYTES NFR BLD AUTO: 28.9 %
MCH RBC QN AUTO: 29.4 PG (ref 26–34)
MCHC RBC AUTO-ENTMCNC: 33.1 G/DL (ref 31–37)
MCV RBC AUTO: 89 FL
MONOCYTES # BLD AUTO: 0.59 X10(3) UL (ref 0.1–1)
MONOCYTES NFR BLD AUTO: 7 %
NEUTROPHILS # BLD AUTO: 5.09 X10 (3) UL (ref 1.5–7.7)
NEUTROPHILS # BLD AUTO: 5.09 X10(3) UL (ref 1.5–7.7)
NEUTROPHILS NFR BLD AUTO: 60.1 %
PLATELET # BLD AUTO: 257 10(3)UL (ref 150–450)
RBC # BLD AUTO: 4.18 X10(6)UL
TOTAL IRON BINDING CAPACITY: 332 UG/DL (ref 250–425)
TRANSFERRIN SERPL-MCNC: 273 MG/DL (ref 250–380)
VIT B12 SERPL-MCNC: 306 PG/ML (ref 211–911)
WBC # BLD AUTO: 8.5 X10(3) UL (ref 4–11)

## 2024-12-03 PROCEDURE — 82607 VITAMIN B-12: CPT

## 2024-12-03 PROCEDURE — 36415 COLL VENOUS BLD VENIPUNCTURE: CPT

## 2024-12-03 PROCEDURE — 83550 IRON BINDING TEST: CPT

## 2024-12-03 PROCEDURE — 85025 COMPLETE CBC W/AUTO DIFF WBC: CPT

## 2024-12-03 PROCEDURE — 83540 ASSAY OF IRON: CPT

## 2024-12-03 PROCEDURE — 82728 ASSAY OF FERRITIN: CPT

## 2024-12-03 PROCEDURE — 82746 ASSAY OF FOLIC ACID SERUM: CPT

## 2024-12-30 ENCOUNTER — HOSPITAL ENCOUNTER (OUTPATIENT)
Age: 64
Discharge: HOME | End: 2024-12-30
Payer: COMMERCIAL

## 2024-12-30 ENCOUNTER — NURSE TRIAGE (OUTPATIENT)
Dept: FAMILY MEDICINE CLINIC | Facility: CLINIC | Age: 64
End: 2024-12-30

## 2024-12-30 VITALS
BODY MASS INDEX: 29.99 KG/M2 | HEIGHT: 65 IN | HEART RATE: 80 BPM | OXYGEN SATURATION: 97 % | RESPIRATION RATE: 18 BRPM | DIASTOLIC BLOOD PRESSURE: 68 MMHG | TEMPERATURE: 98 F | SYSTOLIC BLOOD PRESSURE: 145 MMHG | WEIGHT: 180 LBS

## 2024-12-30 DIAGNOSIS — N61.1 LEFT BREAST ABSCESS: Primary | ICD-10-CM

## 2024-12-30 PROCEDURE — 99214 OFFICE O/P EST MOD 30 MIN: CPT

## 2024-12-30 PROCEDURE — 99213 OFFICE O/P EST LOW 20 MIN: CPT

## 2024-12-30 NOTE — TELEPHONE ENCOUNTER
Please refer pt to  for eval. No appts available in office and I will not be in office until 1/3/25.  Would like pt to be eval today and get treatment accordingly prior to her upcoming travels next week.   (Pt has h/o breast abscess in past)

## 2024-12-30 NOTE — ED PROVIDER NOTES
Patient Seen in: [facility]      History     Chief Complaint   Patient presents with    Breast Lump     Stated Complaint: lump on left breast    Subjective:   HPI  64-year-old female with known history of diabetes, GERD, anxiety hyperlipidemia and hypertension comes in today for evaluation of left breast symptoms. Pt notes  L breast skin lesion for 4-5 days to inferior breast at 6 o'clock region   Some pain/tenderness in region. No drainage. No nipple discharge, this previously in March mammogram was in  no evidence of any masses on the mammogram, no history of breast cancer to the family  Objective:     Past Medical History:    Anxiety    Arthritis    Cataracts, bilateral    Diabetes mellitus (HCC)    Disorder of liver    fatty liver    Esophageal reflux    Flatulence/gas pain/belching    Hearing loss    High blood pressure    High cholesterol    Hyperlipidemia    Leg swelling    Lumbago    Meningioma (HCC)    Migraines    Obesity    Osteoarthritis    Pain in joints    Prediabetes    Seropositive rheumatoid arthritis (HCC)    Sleep apnea    Sprain of neck    Stress    Unspecified essential hypertension    Unspecified essential hypertension    Vertigo    Vitamin D deficiency    Wears glasses    Weight gain              Past Surgical History:   Procedure Laterality Date    Cataract Bilateral     Colonoscopy  2020    Tubular Adenomas, recheck 5 years    Colonoscopy  10/02/2023    Internal Hemorrhoids. recheck 5 years    Contracept iud      REMOVED    Cyst aspiration right      Breast Lesion - benign cyst    Egd  10/02/2023    Normal, Path benign    Knee replacement surgery Left 2020          Other      boil resection from inner thighs due to virus    Other surgical history      groin abscess bilaterally                Social History     Socioeconomic History    Marital status:    Tobacco Use    Smoking status: Never     Passive exposure: Never    Smokeless tobacco:  Never    Tobacco comments:     Updated 9/30/24   Vaping Use    Vaping status: Never Used   Substance and Sexual Activity    Alcohol use: No     Alcohol/week: 0.0 standard drinks of alcohol    Drug use: No   Other Topics Concern    Caffeine Concern Yes     Comment: 2 cups daily    Exercise Yes     Comment: 1x week   Social History Narrative    ** Merged History Encounter **                   Review of Systems    Positive for stated complaint: lump on left breast  Other systems are as noted in HPI.  Constitutional and vital signs reviewed.      All other systems reviewed and negative except as noted above.    Physical Exam     ED Triage Vitals [12/30/24 1221]   /68   Pulse 80   Resp 18   Temp 98 °F (36.7 °C)   Temp src Oral   SpO2 97 %   O2 Device None (Room air)       Current Vitals:   Vital Signs  BP: 145/68  Pulse: 80  Resp: 18  Temp: 98 °F (36.7 °C)  Temp src: Oral    Oxygen Therapy  SpO2: 97 %  O2 Device: None (Room air)        Physical Exam  Vitals and nursing note reviewed. Exam conducted with a chaperone present.   Constitutional:       General: She is not in acute distress.     Appearance: Normal appearance. She is well-developed. She is not diaphoretic.   HENT:      Head: Normocephalic and atraumatic.   Cardiovascular:      Rate and Rhythm: Normal rate and regular rhythm.   Pulmonary:      Effort: Pulmonary effort is normal. No respiratory distress.      Breath sounds: Normal breath sounds.   Chest:          Comments: 4x5cm erythematous region with mild induration to the medial 1 cm region but no fluctuance to left breast   Musculoskeletal:      Cervical back: Normal range of motion.   Skin:     General: Skin is warm and dry.      Coloration: Skin is not pale.      Findings: No erythema or rash.   Neurological:      Mental Status: She is alert and oriented to person, place, and time.      Motor: No abnormal muscle tone.      Coordination: Coordination normal.      Deep Tendon Reflexes: Reflexes are  normal and symmetric.   Psychiatric:         Behavior: Behavior normal.         Thought Content: Thought content normal.         Judgment: Judgment normal.         ED Course   Labs Reviewed - No data to display          MDM        Medical Decision Making  63yo Female who comes in today complaining of left-sided breast abscess patient had 1 previously patient noticed that a couple days ago and is persistently worsening.  Patient denies fever or chills.  Denies any drainage.    Problems Addressed:  Left breast abscess: acute illness or injury    Amount and/or Complexity of Data Reviewed  External Data Reviewed: radiology and notes.     Details: Previous mammograms which showed that the patient had a normal mammogram studies reviewed patient's previous note with left breast abscess which was not drained but placed patient placed on Augmentin and self drained from PCP 3/2024    Risk  OTC drugs.  Prescription drug management.  Risk Details: Differential Diagnosis includes breast mass, abscess, breast cellulitis    Take antibiotics as directed, warm compresses, call and schedule appointment with breast surgeon as is the second time you have had an abscess in the same location        Disposition and Plan     Clinical Impression:  1. Left breast abscess         Disposition:  Discharge  12/30/2024 12:55 pm    Follow-up:  Jennifer Hyatt MD  [address] [address]  [phone]    Call in 1 day  to see appointment    Janie Bazan MD  [address] [address]  [phone]      breast surgeon          Medications Prescribed:  Current Discharge Medication List        START taking these medications    Details   amoxicillin clavulanate 875-125 MG Oral Tab Take 1 tablet by mouth 2 (two) times daily for 7 days.  Qty: 14 tablet, Refills: 0                 Supplementary Documentation:

## 2024-12-30 NOTE — DISCHARGE INSTRUCTIONS
Warm Compresses take antibiotics as directed follow-up with breast surgeon    If redness is spreading fevers chills be reevaluated    While taking antibiotics it is recommended that you also take an over the counter probiotics.  If you'd like, you can have 2 servings of yogurt/Kefir daily instead.  Probiotics increase the good bacteria in your gut while the antibiotic fights the bad bacteria that is causing your infection.  The good bacteria in your gut act as part of your immune system.  Taking a probiotic while on antibiotics will decrease the chances of upset stomach and diarrhea, which are common side effects of antibiotics.

## 2024-12-30 NOTE — ED INITIAL ASSESSMENT (HPI)
Presents for left sided breast pain / lump. Was previously treated for cyst like lump in area with antibiotics, it ruptured and completely resolved and has now returned. Denies fevers.

## 2024-12-30 NOTE — TELEPHONE ENCOUNTER
Called and spoke with spouse Crispin. Notified of recommendations below from Dr. Lucas. Stated pt feels comfortable with Dr. Lucas. Notified can request female provider at  and they can also use a  at  to ensure she understands. Spouse asking me to keep an appointment on hold if opening on Friday. Notified the recommendation is to be seen today and unable to hold appointments, advised again the recommendation is to be seen today in IC. Spouse verbalizes understanding and stated he will take the pt.

## 2025-01-02 ENCOUNTER — OFFICE VISIT (OUTPATIENT)
Dept: SURGERY | Facility: CLINIC | Age: 65
End: 2025-01-02
Payer: COMMERCIAL

## 2025-01-02 VITALS
DIASTOLIC BLOOD PRESSURE: 75 MMHG | TEMPERATURE: 97 F | BODY MASS INDEX: 33.29 KG/M2 | SYSTOLIC BLOOD PRESSURE: 136 MMHG | WEIGHT: 195 LBS | OXYGEN SATURATION: 98 % | HEIGHT: 64 IN | RESPIRATION RATE: 18 BRPM | HEART RATE: 75 BPM

## 2025-01-02 DIAGNOSIS — N61.1 LEFT BREAST ABSCESS: Primary | ICD-10-CM

## 2025-01-02 RX ORDER — CEFADROXIL 500 MG/1
500 CAPSULE ORAL 2 TIMES DAILY
Qty: 20 CAPSULE | Refills: 0 | Status: SHIPPED | OUTPATIENT
Start: 2025-01-02 | End: 2025-01-12

## 2025-01-03 NOTE — PROGRESS NOTES
Breast Surgery New Patient Consultation    This is the first visit for this 64 year old woman, referred by LYSSA Blas, who presents for evaluation of left breast abscess.    History of Present Illness:   Ms. Fernanda Starkey is a 64 year old woman who presents with a left breast abscess. She has been started on oral antibiotics and noticed improvement. This has happened 1 other time before. She took antibiotics for this and it went away.  She denies any palpable masses, nipple discharge, or axillary adenopathy. She does have breast pain. She does not have significant past history for breast diseases or breast biopsy. She does have family history of breast cancer. Most recent imaging was a bilateral screening mammogram on 2024 which showed no suspicious findings. She is here today for evaluation and recommendations for further therapy.        Past Medical History:    Anxiety    Arthritis    Cataracts, bilateral    Diabetes mellitus (HCC)    Disorder of liver    fatty liver    Esophageal reflux    Flatulence/gas pain/belching    Hearing loss    High blood pressure    High cholesterol    Hyperlipidemia    Leg swelling    Lumbago    Meningioma (HCC)    Migraines    Obesity    Osteoarthritis    Pain in joints    Prediabetes    Seropositive rheumatoid arthritis (HCC)    Sleep apnea    Sprain of neck    Stress    Unspecified essential hypertension    Unspecified essential hypertension    Vertigo    Vitamin D deficiency    Wears glasses    Weight gain       Past Surgical History:   Procedure Laterality Date    Cataract Bilateral     Colonoscopy  2020    Tubular Adenomas, recheck 5 years    Colonoscopy  10/02/2023    Internal Hemorrhoids. recheck 5 years    Contracept iud      REMOVED    Cyst aspiration right      Breast Lesion - benign cyst    Egd  10/02/2023    Normal, Path benign    Knee replacement surgery Left 2020          Other      boil resection from inner thighs due to virus     Other surgical history      groin abscess bilaterally       Gynecological History:  Pt is a   Pt was 21 years old at time of first pregnancy.    She has cumulative breastfeeding history of 20 months.  She denies any history of hormone replacement therapy  She denies any history of oral contraceptive use   She denies infertility treatment to achieve pregnancy.    Medications:     cefadroxil 500 MG Oral Cap Take 1 capsule (500 mg total) by mouth 2 (two) times daily for 10 days. 20 capsule 0    amoxicillin clavulanate 875-125 MG Oral Tab Take 1 tablet by mouth 2 (two) times daily for 7 days. 14 tablet 0    rosuvastatin 5 MG Oral Tab Take 1 tablet (5 mg total) by mouth nightly. 90 tablet 2    metFORMIN  MG Oral Tablet 24 Hr Take 1 tablet (500 mg total) by mouth 2 (two) times daily with meals. 180 tablet 3    omeprazole 20 MG Oral Capsule Delayed Release Take 1 capsule (20 mg total) by mouth every morning before breakfast. 90 capsule 2    gabapentin 300 MG Oral Cap Take 1 capsule (300 mg total) by mouth nightly. 90 capsule 1    lisinopril-hydroCHLOROthiazide 10-12.5 MG Oral Tab Take 1 tablet by mouth daily. 90 tablet 1    Ferrous Sulfate 325 (65 Fe) MG Oral Tab Take 1 tablet (325 mg total) by mouth daily with breakfast. (Patient taking differently: Take 1 tablet (325 mg total) by mouth every other day.) 90 tablet 2    Cholecalciferol (VITAMIN D3 OR) Take 5,000 Units by mouth daily.      Multiple Vitamins-Minerals (PRESERVISION AREDS) Oral Tab Take by mouth daily.      loratadine 10 MG Oral Tab Take 1 tablet (10 mg total) by mouth daily.         Allergies:    Allergies[1]    Family History:   Family History   Problem Relation Age of Onset    Other (Other) Father         tuberculosis    Other (Coronary artery disease) Father     Other (TB) Father     Dementia Mother     Hypertension Mother     No Known Problems Daughter     No Known Problems Son     No Known Problems Son     No Known Problems Son     Breast  Cancer Sister 40        estimated age    Diabetes Sister     Arthritis Sister         TKA bilaterally    Cancer Sister         breast    Other (Coronary artery disease) Brother     Diabetes Brother        She is not of Ashkenazi Hindu ancestry.    Social History:  History   Alcohol Use No       History   Smoking Status    Never   Smokeless Tobacco    Never   Ms. Fernanda Starkey is  with 4 children. She has 11 siblings. She is currently Homemaker      Review of Systems:  General:   The patient denies, fever, chills, night sweats, fatigue, generalized weakness, change in appetite or weight loss.    HEENT:     The patient denies eye irritation, cataracts, redness, glaucoma, yellowing of the eyes, change in vision, color blindness, or wearing contacts/glasses. The patient denies hearing loss, ringing in the ears, ear drainage, earaches, nasal congestion, nose bleeds, snoring, pain in mouth/throat, hoarseness, change in voice, facial trauma.    Respiratory:  The patient denies chronic cough, phlegm, hemoptysis, pleurisy/chest pain, pneumonia, asthma, wheezing, difficulty in breathing with exertion, emphysema, chronic bronchitis, shortness of breath or abnormal sound when breathing.     Cardiovascular:  There is no history of chest pain, chest pressure/discomfort, palpitations, irregular heartbeat, fainting or near-fainting, difficulty breathing when lying flat, SOB/Coughing at night, swelling of the legs or chest pain while walking.    Breasts:  See history of present illness    Gastrointestinal:     There is no history of difficulty or pain with swallowing, reflux symptoms, vomiting, dark or bloody stools, constipation, yellowing of the skin, indigestion, nausea, change in bowel habits, diarrhea, abdominal pain or vomiting blood.     Genitourinary:  The patient denies frequent urination, needing to get up at night to urinate, urinary hesitancy or retaining urine, painful urination, urinary incontinence,  decreased urine stream, blood in the urine or vaginal/penile discharge.    Skin:    The patient denies rash, itching, skin lesions, dry skin, change in skin color or change in moles.     Hematologic/Lymphatic:  The patient denies easily bruising or bleeding or persistent swollen glands or lymph nodes.     Musculoskeletal:  The patient denies muscle aches/pain, joint pain, stiff joints, neck pain, back pain or bone pain.    Neuropsychiatric:  There is no history of migraines or severe headaches, seizure/epilepsy, speech problems, coordination problems, trembling/tremors, fainting/black outs, dizziness, memory problems, loss of sensation/numbness, problems walking, weakness, tingling or burning in hands/feet. There is no history of abusive relationship, bipolar disorder, sleep disturbance, anxiety, depression or feeling of despair.    Endocrine:    There is no history of poor/slow wound healing, weight loss/gain, fertility or hormone problems, cold intolerance, thyroid disease.     Allergic/Immunologic:  There is no history of hives, hay fever, angioedema or anaphylaxis.    /75   Pulse 75   Temp 97.2 °F (36.2 °C) (Temporal)   Resp 18   Ht 1.626 m (5' 4\")   Wt 88.5 kg (195 lb)   SpO2 98%   BMI 33.47 kg/m²     Physical Exam:  The patient is an alert, oriented, well-nourished and  well-developed woman who appears her stated age. Her speech patterns and movements are normal. Her affect is appropriate.    HEENT: The head is normocephalic. The neck is supple. The thyroid is not enlarged and is without palpable masses/nodules. There are no palpable masses. The trachea is in the midline. Conjunctiva are clear, non-icteric.    Chest: The chest expands symmetrically. The lungs are clear to auscultation.    Heart: The rhythm is regular.  There are no murmurs, rubs, gallops or thrills.    Breasts:  Her breasts are symmetrical with a cup size 42D.  Right breast: The skin, nipple ,and areola appear normal. There is no  skin dimpling with movement of the pectoralis. There is no nipple retraction. No nipple discharge can be elicited. The parenchyma is mildly nodular. There are no dominant masses in the breast. The axillary tail is normal.  Left breast:   The skin, nipple, and areola appear normal. There is no skin dimpling with movement of the pectoralis. There is no nipple retraction. No nipple discharge can be elicited. The parenchyma is mildly nodular. 6 o'clock position 6cm from the nipple there is a 1.5 cm abscess/sebaceous cyst.  The axillary tail is normal.    Abdomen:  The abdomen is soft, flat and non tender. The liver is not enlarged. There are no palpable masses.    Lymph Nodes:  The supraclavicular, axillary and cervical regions are free of significant lymphadenopathy.    Back: There is no vertebral column tenderness.    Skin: The skin appears normal. There are no suspicious appearing rashes or lesions.    Extremities: The extremities are without deformity, cyanosis or edema.    Impression:   Ms. Fernanda Starkey is a 64 year old woman presents with a left breast abscess/infected sebaceous cyst    Discussion and Plan:  I had a discussion with the Patient regarding her breast exam. She has noticed improvement since starting her oral antibiotics and states the area is less irritated. I recommend she continue her oral antibiotics. A refill for antibiotics was sent to her pharmacy as she is going out of the country for 6 weeks. I encouraged her to apply warm compresses to help promote drainage.     She will follow up with Dr. Bazan when she returns from her trip in February.  She was given ample opportunity for questions and those questions were answered to her satisfaction. She has been  encouraged to contact the office with any questions or concerns prior to her next appointment.     This encounter lasted a total of 30 minutes, more than 50% of which was dedicated to the discussion of management options.         This  note was created by Dragon voice recognition. Errors in content may be related to improper recognition by the system; efforts to review and correct have been done but errors may still exist. Please be advised the primary purpose of this note is for me to communicate medical care. Standard sentence structure is not always used. Medical terminology and medical abbreviations may be used. There may be grammatical, typographical, and automated fill ins that may have errors missed in proofreading.           [1]   Allergies  Allergen Reactions    Imitrex [Sumatriptan Succinate] NAUSEA ONLY     bleeding    Imitrex [Sumatriptan] HIVES    Nexium [Esomeprazole] ITCHING    Pollen     Radiology Contrast Iodinated Dyes RASH

## 2025-01-20 DIAGNOSIS — I10 ESSENTIAL HYPERTENSION: ICD-10-CM

## 2025-01-20 RX ORDER — LISINOPRIL AND HYDROCHLOROTHIAZIDE 10; 12.5 MG/1; MG/1
1 TABLET ORAL DAILY
Qty: 90 TABLET | Refills: 3 | Status: SHIPPED | OUTPATIENT
Start: 2025-01-20

## 2025-01-20 NOTE — TELEPHONE ENCOUNTER
Received call from pt's spouse, calling to see why lisinopril refill was denied. I do not see any documentation request was received, do not see denial. Last refilled #90 on 8/4/24 per dispense report.     Refill pended.

## 2025-01-20 NOTE — TELEPHONE ENCOUNTER
Refill passed per [facility] protocol.  Requested Prescriptions   Pending Prescriptions Disp Refills    lisinopril-hydroCHLOROthiazide 10-12.5 MG Oral Tab 90 tablet 1     Sig: Take 1 tablet by mouth daily.       Hypertension Medications Protocol Passed - 1/20/2025  1:22 PM        Passed - CMP or BMP in past 12 months        Passed - Last BP reading less than 140/90     BP Readings from Last 1 Encounters:   01/02/25 136/75               Passed - In person appointment or virtual visit in the past 12 mos or appointment in next 3 mos     Recent Outpatient Visits              2 weeks ago Left breast abscess    [place] Mayela Luis APRN    Office Visit    1 month ago Iron deficiency anemia, unspecified iron deficiency anemia type    [facility] in [place]    Nurse Only    2 months ago Mixed hyperlipidemia    [address] Inocencio Lucas MD    Office Visit    3 months ago Primary osteoarthritis of right knee    [address] Doug Wilson MD    Office Visit    4 months ago Iron deficiency anemia, unspecified iron deficiency anemia type    [facility] in [place]    Office Visit          Future Appointments         Provider Department Appt Notes    In 3 weeks Lupe De León APRN [facility],  LTD 12/10/24 [facility]  11/12/24 fuov sched w/Lupe on 1/16/24 6mth fu-ataylor    In 3 weeks Inocencio Lucas MD [address] 3 month f/u as per the     In 4 weeks Janie Bazan MD [facility]                     Passed - EGFRCR or GFRNAA > 50     GFR Evaluation  EGFRCR: 70 , resulted on 9/23/2024          Passed - Medication is active on med list           Future Appointments         Provider Department Appt Notes    In 3 weeks  Lupe De León APRN [facility],  LTD 12/10/24 R/S to 2/14/25 at 10am-[facility]  11/12/24 fuov sched anali/Lupe on 1/16/24 6mth fu-ataylor    In 3 weeks Inocencio Lucas MD [facility], [address] [place] [address] 3 month f/u as per the DrJarrod    In 4 weeks Janie Bazan MD [facility], [facility]           Recent Outpatient Visits              2 weeks ago Left breast abscess    [facility] Mayela Luis APRN    Office Visit    1 month ago Iron deficiency anemia, unspecified iron deficiency anemia type    [facility] in [place]    Nurse Only    2 months ago Mixed hyperlipidemia    [facility], [address] [place] [address] Inocencio Lucas MD    Office Visit    3 months ago Primary osteoarthritis of right knee    [facility], [address] [place] [address] Doug Wilson MD    Office Visit    4 months ago Iron deficiency anemia, unspecified iron deficiency anemia type    [facility] in [place]    Office Visit

## 2025-02-11 NOTE — TELEPHONE ENCOUNTER
Action Requested: Summary for Provider     []  Critical Lab, Recommendations Needed  [x] Need Additional Advice  []   FYI    []   Need Orders  [] Need Medications Sent to Pharmacy  []  Other     SUMMARY: Spouse called to see if patient could be seen this week for breast lump. Patient is going out of town next Monday for 6 weeks.   Spouse reports a painful breast lump that patient noticed 2 days ago. Patient denies any history of breast cysts or breast cancer.     Dr Lucas would be you able to see patient prior to her trip?    Reason for call: Breast Lump  Onset: 2 days       Reason for Disposition   Breast lump    Protocols used: Breast Symptoms (Female) - After Hstuoci-I-CO     RX sent to NP

## 2025-02-13 ENCOUNTER — LAB ENCOUNTER (OUTPATIENT)
Dept: LAB | Age: 65
End: 2025-02-13
Attending: FAMILY MEDICINE
Payer: COMMERCIAL

## 2025-02-13 DIAGNOSIS — E78.2 MIXED HYPERLIPIDEMIA: ICD-10-CM

## 2025-02-13 DIAGNOSIS — K76.0 FATTY LIVER: ICD-10-CM

## 2025-02-13 DIAGNOSIS — E66.9 TYPE 2 DIABETES MELLITUS WITH OBESITY (HCC): ICD-10-CM

## 2025-02-13 DIAGNOSIS — I10 PRIMARY HYPERTENSION: ICD-10-CM

## 2025-02-13 DIAGNOSIS — E11.69 TYPE 2 DIABETES MELLITUS WITH OBESITY (HCC): ICD-10-CM

## 2025-02-13 DIAGNOSIS — Z51.81 ENCOUNTER FOR MEDICATION MONITORING: ICD-10-CM

## 2025-02-13 DIAGNOSIS — E11.9 CONTROLLED TYPE 2 DIABETES MELLITUS WITHOUT COMPLICATION, WITHOUT LONG-TERM CURRENT USE OF INSULIN (HCC): ICD-10-CM

## 2025-02-13 LAB
ALBUMIN SERPL-MCNC: 4.5 G/DL (ref 3.2–4.8)
ALBUMIN/GLOB SERPL: 1.7 {RATIO} (ref 1–2)
ALP LIVER SERPL-CCNC: 88 U/L
ALT SERPL-CCNC: 31 U/L
ANION GAP SERPL CALC-SCNC: 6 MMOL/L (ref 0–18)
AST SERPL-CCNC: 29 U/L (ref ?–34)
BILIRUB SERPL-MCNC: 0.7 MG/DL (ref 0.2–1.1)
BUN BLD-MCNC: 14 MG/DL (ref 9–23)
CALCIUM BLD-MCNC: 9.8 MG/DL (ref 8.7–10.6)
CHLORIDE SERPL-SCNC: 107 MMOL/L (ref 98–112)
CHOLEST SERPL-MCNC: 204 MG/DL (ref ?–200)
CO2 SERPL-SCNC: 31 MMOL/L (ref 21–32)
CREAT BLD-MCNC: 0.76 MG/DL
CREAT UR-SCNC: 138.7 MG/DL
EGFRCR SERPLBLD CKD-EPI 2021: 88 ML/MIN/1.73M2 (ref 60–?)
EST. AVERAGE GLUCOSE BLD GHB EST-MCNC: 137 MG/DL (ref 68–126)
FASTING PATIENT LIPID ANSWER: YES
FASTING STATUS PATIENT QL REPORTED: YES
GLOBULIN PLAS-MCNC: 2.6 G/DL (ref 2–3.5)
GLUCOSE BLD-MCNC: 111 MG/DL (ref 70–99)
HBA1C MFR BLD: 6.4 % (ref ?–5.7)
HDLC SERPL-MCNC: 75 MG/DL (ref 40–59)
LDLC SERPL CALC-MCNC: 102 MG/DL (ref ?–100)
MICROALBUMIN UR-MCNC: 8.9 MG/DL
MICROALBUMIN/CREAT 24H UR-RTO: 64.2 UG/MG (ref ?–30)
NONHDLC SERPL-MCNC: 129 MG/DL (ref ?–130)
OSMOLALITY SERPL CALC.SUM OF ELEC: 299 MOSM/KG (ref 275–295)
POTASSIUM SERPL-SCNC: 4.1 MMOL/L (ref 3.5–5.1)
PROT SERPL-MCNC: 7.1 G/DL (ref 5.7–8.2)
SODIUM SERPL-SCNC: 144 MMOL/L (ref 136–145)
TRIGL SERPL-MCNC: 156 MG/DL (ref 30–149)
VLDLC SERPL CALC-MCNC: 26 MG/DL (ref 0–30)

## 2025-02-13 PROCEDURE — 80053 COMPREHEN METABOLIC PANEL: CPT

## 2025-02-13 PROCEDURE — 82043 UR ALBUMIN QUANTITATIVE: CPT

## 2025-02-13 PROCEDURE — 36415 COLL VENOUS BLD VENIPUNCTURE: CPT

## 2025-02-13 PROCEDURE — 82570 ASSAY OF URINE CREATININE: CPT

## 2025-02-13 PROCEDURE — 80061 LIPID PANEL: CPT

## 2025-02-13 PROCEDURE — 83036 HEMOGLOBIN GLYCOSYLATED A1C: CPT

## 2025-02-14 ENCOUNTER — TELEPHONE (OUTPATIENT)
Dept: FAMILY MEDICINE CLINIC | Facility: CLINIC | Age: 65
End: 2025-02-14

## 2025-02-14 ENCOUNTER — NURSE ONLY (OUTPATIENT)
Dept: FAMILY MEDICINE CLINIC | Facility: CLINIC | Age: 65
End: 2025-02-14
Payer: COMMERCIAL

## 2025-02-14 DIAGNOSIS — Z23 NEED FOR MENINGOCOCCAL VACCINATION: Primary | ICD-10-CM

## 2025-02-14 NOTE — TELEPHONE ENCOUNTER
Patient's appointment cancelled today. Wanted Meningitis vaccine for travel next week. Can patient schedule nurse visit today for vaccine?

## 2025-02-14 NOTE — TELEPHONE ENCOUNTER
Dr. Lucas, pended the vaccine and schedule a nurse visit for the patient today.     Future Appointments   Date Time Provider Department Center   2/14/2025  1:00 PM [facility] NURSE [facility]   2/18/2025  2:30 PM Janie Bazan MD [facility]   3/4/2025 11:40 AM Inocencio Lucas MD [facility]

## 2025-02-14 NOTE — TELEPHONE ENCOUNTER
Patients daughter called  (Nasima Cheney - [phone] - also a patient here)in regard to Patients appointment being cancelled today.     She did not reschedule due to patient going to [place], 2[place]18[place]25.    Part of today's appointment was to get an Meningitis shot for her travel.      As per daughter her Dad recently got the same shot from Dr Wade.  Daughter said she thought it had to be done in office due to insurance.      Daughter would like this resolved today.  Daughter not on HIPAA.  Patients spouse is and a son.

## 2025-02-18 ENCOUNTER — OFFICE VISIT (OUTPATIENT)
Dept: SURGERY | Facility: CLINIC | Age: 65
End: 2025-02-18
Payer: COMMERCIAL

## 2025-02-18 VITALS
BODY MASS INDEX: 32.78 KG/M2 | RESPIRATION RATE: 16 BRPM | SYSTOLIC BLOOD PRESSURE: 137 MMHG | HEART RATE: 82 BPM | DIASTOLIC BLOOD PRESSURE: 70 MMHG | OXYGEN SATURATION: 96 % | HEIGHT: 64 IN | TEMPERATURE: 98 F | WEIGHT: 192 LBS

## 2025-02-18 DIAGNOSIS — Z12.31 SCREENING MAMMOGRAM, ENCOUNTER FOR: ICD-10-CM

## 2025-02-18 DIAGNOSIS — N61.1 LEFT BREAST ABSCESS: Primary | ICD-10-CM

## 2025-02-18 RX ORDER — ASPIRIN 81 MG/1
81 TABLET ORAL DAILY
COMMUNITY

## 2025-02-28 PROBLEM — N61.1 LEFT BREAST ABSCESS: Status: ACTIVE | Noted: 2025-02-28

## 2025-02-28 NOTE — PROGRESS NOTES
Breast Surgery Surveillance    History of Present Illness:   Ms. Fernanda Starkey is a 64 year old woman who presents with a left breast abscess. She has been started on oral antibiotics and noticed improvement. This has happened 1 other time before. She took antibiotics for this and it went away.  She denies any palpable masses, nipple discharge, or axillary adenopathy. She does have breast pain. She does not have significant past history for breast diseases or breast biopsy. She does have family history of breast cancer. Most recent imaging was a bilateral screening mammogram on 2024 which showed no suspicious findings.  Since her last visit she reports that she has not had any issues related to this cystic lesion.  She is here today for evaluation and recommendations for further therapy.        Past Medical History:    Anxiety    Arthritis    Back pain    Cataracts, bilateral    Change in hair    Chest pain    Diabetes mellitus (HCC)    Disorder of liver    fatty liver    Esophageal reflux    Eye disease    Fatigue    Flatulence/gas pain/belching    Glaucoma    Hearing loss    Heartburn    High blood pressure    High cholesterol    Hyperlipidemia    Leg swelling    Lumbago    Meningioma (HCC)    Migraines    Obesity    Osteoarthritis    Pain in joints    Prediabetes    Seropositive rheumatoid arthritis (HCC)    Sleep apnea    Sleep disturbance    Sprain of neck    Stress    Unspecified essential hypertension    Unspecified essential hypertension    Vertigo    Vitamin D deficiency    Wears glasses    Weight gain       Past Surgical History:   Procedure Laterality Date    Cataract Bilateral     Colonoscopy  2020    Tubular Adenomas, recheck 5 years    Colonoscopy  10/02/2023    Internal Hemorrhoids. recheck 5 years    Contracept iud      REMOVED    Cyst aspiration right      Breast Lesion - benign cyst    Egd  10/02/2023    Normal, Path benign    Knee replacement surgery Left 2020           Other      boil resection from inner thighs due to virus    Other surgical history      groin abscess bilaterally       Gynecological History:  Pt is a   Pt was 21 years old at time of first pregnancy.    She has cumulative breastfeeding history of 20 months.  She denies any history of hormone replacement therapy  She denies any history of oral contraceptive use   She denies infertility treatment to achieve pregnancy.    Medications:     aspirin 81 MG Oral Tab EC Take 1 tablet (81 mg total) by mouth daily.      lisinopril-hydroCHLOROthiazide 10-12.5 MG Oral Tab Take 1 tablet by mouth daily. 90 tablet 3    rosuvastatin 5 MG Oral Tab Take 1 tablet (5 mg total) by mouth nightly. 90 tablet 2    metFORMIN  MG Oral Tablet 24 Hr Take 1 tablet (500 mg total) by mouth 2 (two) times daily with meals. 180 tablet 3    omeprazole 20 MG Oral Capsule Delayed Release Take 1 capsule (20 mg total) by mouth every morning before breakfast. 90 capsule 2    gabapentin 300 MG Oral Cap Take 1 capsule (300 mg total) by mouth nightly. 90 capsule 1    Ferrous Sulfate 325 (65 Fe) MG Oral Tab Take 1 tablet (325 mg total) by mouth daily with breakfast. 90 tablet 2    Cholecalciferol (VITAMIN D3 OR) Take 5,000 Units by mouth daily.      Multiple Vitamins-Minerals (PRESERVISION AREDS) Oral Tab Take by mouth daily.      loratadine 10 MG Oral Tab Take 1 tablet (10 mg total) by mouth daily.         Allergies:    Allergies[1]    Family History:   Family History   Problem Relation Age of Onset    Other (Other) Father         tuberculosis    Other (Coronary artery disease) Father     Other (TB) Father     Dementia Mother     Hypertension Mother     No Known Problems Daughter     No Known Problems Son     No Known Problems Son     No Known Problems Son     Breast Cancer Sister 40        estimated age    Diabetes Sister     Arthritis Sister         TKA bilaterally    Cancer Sister         breast    Other (Coronary artery disease) Brother      Diabetes Brother        She is not of Ashkenazi Faith ancestry.    Social History:  History   Alcohol Use No       History   Smoking Status    Never   Smokeless Tobacco    Never   Ms. Fernanda Starkey is  with 4 children. She has 11 siblings. She is currently Homemaker      Review of Systems:  General:   The patient denies, fever, chills, night sweats, fatigue, generalized weakness, change in appetite or weight loss.    HEENT:     The patient denies eye irritation, cataracts, redness, glaucoma, yellowing of the eyes, change in vision, color blindness, or wearing contacts/glasses. The patient denies hearing loss, ringing in the ears, ear drainage, earaches, nasal congestion, nose bleeds, snoring, pain in mouth/throat, hoarseness, change in voice, facial trauma.    Respiratory:  The patient denies chronic cough, phlegm, hemoptysis, pleurisy/chest pain, pneumonia, asthma, wheezing, difficulty in breathing with exertion, emphysema, chronic bronchitis, shortness of breath or abnormal sound when breathing.     Cardiovascular:  There is no history of chest pain, chest pressure/discomfort, palpitations, irregular heartbeat, fainting or near-fainting, difficulty breathing when lying flat, SOB/Coughing at night, swelling of the legs or chest pain while walking.    Breasts:  See history of present illness    Gastrointestinal:     There is no history of difficulty or pain with swallowing, reflux symptoms, vomiting, dark or bloody stools, constipation, yellowing of the skin, indigestion, nausea, change in bowel habits, diarrhea, abdominal pain or vomiting blood.     Genitourinary:  The patient denies frequent urination, needing to get up at night to urinate, urinary hesitancy or retaining urine, painful urination, urinary incontinence, decreased urine stream, blood in the urine or vaginal/penile discharge.    Skin:    The patient denies rash, itching, skin lesions, dry skin, change in skin color or change in moles.      Hematologic/Lymphatic:  The patient denies easily bruising or bleeding or persistent swollen glands or lymph nodes.     Musculoskeletal:  The patient denies muscle aches/pain, joint pain, stiff joints, neck pain, back pain or bone pain.    Neuropsychiatric:  There is no history of migraines or severe headaches, seizure/epilepsy, speech problems, coordination problems, trembling/tremors, fainting/black outs, dizziness, memory problems, loss of sensation/numbness, problems walking, weakness, tingling or burning in hands/feet. There is no history of abusive relationship, bipolar disorder, sleep disturbance, anxiety, depression or feeling of despair.    Endocrine:    There is no history of poor/slow wound healing, weight loss/gain, fertility or hormone problems, cold intolerance, thyroid disease.     Allergic/Immunologic:  There is no history of hives, hay fever, angioedema or anaphylaxis.    /70   Pulse 82   Temp 98.2 °F (36.8 °C) (Temporal)   Resp 16   Ht 1.626 m (5' 4\")   Wt 87.1 kg (192 lb)   SpO2 96%   BMI 32.96 kg/m²     Physical Exam:  The patient is an alert, oriented, well-nourished and  well-developed woman who appears her stated age. Her speech patterns and movements are normal. Her affect is appropriate.    HEENT: The head is normocephalic. The neck is supple. The thyroid is not enlarged and is without palpable masses/nodules. There are no palpable masses. The trachea is in the midline. Conjunctiva are clear, non-icteric.    Chest: The chest expands symmetrically. The lungs are clear to auscultation.    Heart: The rhythm is regular.  There are no murmurs, rubs, gallops or thrills.    Breasts:  Her breasts are symmetrical with a cup size 42D.  Right breast: The skin, nipple ,and areola appear normal. There is no skin dimpling with movement of the pectoralis. There is no nipple retraction. No nipple discharge can be elicited. The parenchyma is mildly nodular. There are no dominant masses in  the breast. The axillary tail is normal.  Left breast:   The skin, nipple, and areola appear normal. There is no skin dimpling with movement of the pectoralis. There is no nipple retraction. No nipple discharge can be elicited. The parenchyma is mildly nodular. 6 o'clock position 6cm from the nipple there is no residual cystic lesion identified the axillary tail is normal.    Abdomen:  The abdomen is soft, flat and non tender. The liver is not enlarged. There are no palpable masses.    Lymph Nodes:  The supraclavicular, axillary and cervical regions are free of significant lymphadenopathy.    Back: There is no vertebral column tenderness.    Skin: The skin appears normal. There are no suspicious appearing rashes or lesions.    Extremities: The extremities are without deformity, cyanosis or edema.    Impression:   Ms. Fernanda Starkey is a 64 year old woman presents with a left breast abscess/infected sebaceous cyst, now clinically and symptomatically resolved.    Discussion and Plan:  I had a discussion with the Patient regarding her breast exam. She does not have any residual cystic lesion on exam today.  She will be due for her next bilateral screening in June 2025.  I reviewed her prior imaging which was unremarkable.  She was encouraged to contact us if she develops any new infectious symptoms.  She was given ample opportunity for questions and those questions were answered to her satisfaction. She has been  encouraged to contact the office with any questions or concerns prior to her next appointment.     This encounter lasted a total of 30 minutes, more than 50% of which was dedicated to the discussion of management options.         This note was created by Dragon voice recognition. Errors in content may be related to improper recognition by the system; efforts to review and correct have been done but errors may still exist. Please be advised the primary purpose of this note is for me to communicate medical  care. Standard sentence structure is not always used. Medical terminology and medical abbreviations may be used. There may be grammatical, typographical, and automated fill ins that may have errors missed in proofreading.           [1]   Allergies  Allergen Reactions    Imitrex [Sumatriptan Succinate] NAUSEA ONLY     bleeding    Imitrex [Sumatriptan] HIVES    Nexium [Esomeprazole] ITCHING    Pollen     Radiology Contrast Iodinated Dyes RASH

## 2025-03-04 ENCOUNTER — OFFICE VISIT (OUTPATIENT)
Dept: FAMILY MEDICINE CLINIC | Facility: CLINIC | Age: 65
End: 2025-03-04
Payer: COMMERCIAL

## 2025-03-04 VITALS
WEIGHT: 193 LBS | TEMPERATURE: 98 F | RESPIRATION RATE: 16 BRPM | HEART RATE: 83 BPM | BODY MASS INDEX: 32.95 KG/M2 | DIASTOLIC BLOOD PRESSURE: 70 MMHG | HEIGHT: 64 IN | SYSTOLIC BLOOD PRESSURE: 136 MMHG | OXYGEN SATURATION: 98 %

## 2025-03-04 DIAGNOSIS — E78.2 MIXED HYPERLIPIDEMIA: ICD-10-CM

## 2025-03-04 DIAGNOSIS — K21.9 GASTROESOPHAGEAL REFLUX DISEASE WITHOUT ESOPHAGITIS: ICD-10-CM

## 2025-03-04 DIAGNOSIS — M79.661 BILATERAL CALF PAIN: ICD-10-CM

## 2025-03-04 DIAGNOSIS — Z51.81 ENCOUNTER FOR MEDICATION MONITORING: ICD-10-CM

## 2025-03-04 DIAGNOSIS — Z78.9 HISTORY OF INTERNATIONAL TRAVEL: ICD-10-CM

## 2025-03-04 DIAGNOSIS — G47.33 OSA ON CPAP: ICD-10-CM

## 2025-03-04 DIAGNOSIS — E66.9 TYPE 2 DIABETES MELLITUS WITH OBESITY (HCC): ICD-10-CM

## 2025-03-04 DIAGNOSIS — E11.29 CONTROLLED TYPE 2 DIABETES MELLITUS WITH MICROALBUMINURIA, WITHOUT LONG-TERM CURRENT USE OF INSULIN (HCC): ICD-10-CM

## 2025-03-04 DIAGNOSIS — R60.0 BILATERAL EDEMA OF LOWER EXTREMITY: ICD-10-CM

## 2025-03-04 DIAGNOSIS — I10 ESSENTIAL HYPERTENSION: Primary | ICD-10-CM

## 2025-03-04 DIAGNOSIS — E11.69 TYPE 2 DIABETES MELLITUS WITH OBESITY (HCC): ICD-10-CM

## 2025-03-04 DIAGNOSIS — M79.662 BILATERAL CALF PAIN: ICD-10-CM

## 2025-03-04 DIAGNOSIS — M17.11 PRIMARY OSTEOARTHRITIS OF RIGHT KNEE: ICD-10-CM

## 2025-03-04 DIAGNOSIS — R80.9 CONTROLLED TYPE 2 DIABETES MELLITUS WITH MICROALBUMINURIA, WITHOUT LONG-TERM CURRENT USE OF INSULIN (HCC): ICD-10-CM

## 2025-03-04 PROCEDURE — 3075F SYST BP GE 130 - 139MM HG: CPT | Performed by: FAMILY MEDICINE

## 2025-03-04 PROCEDURE — 3061F NEG MICROALBUMINURIA REV: CPT | Performed by: FAMILY MEDICINE

## 2025-03-04 PROCEDURE — 3008F BODY MASS INDEX DOCD: CPT | Performed by: FAMILY MEDICINE

## 2025-03-04 PROCEDURE — 3044F HG A1C LEVEL LT 7.0%: CPT | Performed by: FAMILY MEDICINE

## 2025-03-04 PROCEDURE — G2211 COMPLEX E/M VISIT ADD ON: HCPCS | Performed by: FAMILY MEDICINE

## 2025-03-04 PROCEDURE — 3078F DIAST BP <80 MM HG: CPT | Performed by: FAMILY MEDICINE

## 2025-03-04 PROCEDURE — 99214 OFFICE O/P EST MOD 30 MIN: CPT | Performed by: FAMILY MEDICINE

## 2025-03-04 PROCEDURE — 3060F POS MICROALBUMINURIA REV: CPT | Performed by: FAMILY MEDICINE

## 2025-03-04 NOTE — PROGRESS NOTES
Fernanda Starkey is a 64 year old female.  HPI:  Patient is here for follow-up on medications and test results.  Returned 3 days ago from trip overseas to [facility].for [employer] piligrimage.  Fasting for Ramadan and doing ok.  Got black seed oil supplement and MVI daily  Had bilat LE edema with travel.   Kept elevated and now better, but not resolved.  Had pain in lower legs as well but better now after return.  Notes some calf pain bilaterally.  Denies any chest pain or shortness of breath.  No cough or congestion.  Tolerating medications without side effects.  R knee pain due to OA. Had cortisone injection and helped for a couple of weeks.  Now having flareup again.  Limits activity.  H/o LTKR and some L lateral knee pain. .  Saw breast specialist and to have MMG in 6/2025.   Uses CPAP nightly.     Current Outpatient Medications   Medication Sig Dispense Refill    Multiple Vitamin (MULTIVITAMIN OR) Take by mouth daily. Equate MVI with Vitamin D3 1000 units      aspirin 81 MG Oral Tab EC Take 1 tablet (81 mg total) by mouth daily.      lisinopril-hydroCHLOROthiazide 10-12.5 MG Oral Tab Take 1 tablet by mouth daily. 90 tablet 3    rosuvastatin 5 MG Oral Tab Take 1 tablet (5 mg total) by mouth nightly. 90 tablet 2    metFORMIN  MG Oral Tablet 24 Hr Take 1 tablet (500 mg total) by mouth 2 (two) times daily with meals. 180 tablet 3    omeprazole 20 MG Oral Capsule Delayed Release Take 1 capsule (20 mg total) by mouth every morning before breakfast. 90 capsule 2    gabapentin 300 MG Oral Cap Take 1 capsule (300 mg total) by mouth nightly. 90 capsule 1    Multiple Vitamins-Minerals (PRESERVISION AREDS) Oral Tab Take by mouth 2 (two) times a day.      loratadine 10 MG Oral Tab Take 1 tablet (10 mg total) by mouth daily.           Past Medical History:    Anxiety    Arthritis    Back pain    Cataracts, bilateral    Change in hair    Chest pain    Diabetes mellitus (HCC)    Disorder of liver    fatty liver     Esophageal reflux    Eye disease    Fatigue    Flatulence/gas pain/belching    Glaucoma    Hearing loss    Heartburn    High blood pressure    High cholesterol    Hyperlipidemia    Leg swelling    Lumbago    Meningioma (HCC)    Migraines    Obesity    Osteoarthritis    Pain in joints    Prediabetes    Seropositive rheumatoid arthritis (HCC)    Sleep apnea    Sleep disturbance    Sprain of neck    Stress    Unspecified essential hypertension    Unspecified essential hypertension    Vertigo    Vitamin D deficiency    Wears glasses    Weight gain       Past Surgical History:   Procedure Laterality Date    Cataract Bilateral     Colonoscopy  2020    Tubular Adenomas, recheck 5 years    Colonoscopy  10/02/2023    Internal Hemorrhoids. recheck 5 years    Contracept iud      REMOVED    Cyst aspiration right      Breast Lesion - benign cyst    Egd  10/02/2023    Normal, Path benign    Knee replacement surgery Left 2020          Other      boil resection from inner thighs due to virus    Other surgical history      groin abscess bilaterally         Social History     Socioeconomic History    Marital status:    Tobacco Use    Smoking status: Never     Passive exposure: Never    Smokeless tobacco: Never    Tobacco comments:     Updated 24   Vaping Use    Vaping status: Never Used   Substance and Sexual Activity    Alcohol use: No     Alcohol/week: 0.0 standard drinks of alcohol    Drug use: No   Other Topics Concern    Caffeine Concern Yes     Comment: 2 cups daily    Exercise Yes     Comment: 1x week   Social History Narrative    ** Merged History Encounter **                        REVIEW OF SYSTEMS:  GENERAL HEALTH: feels well otherwise, mood is good  SKIN: denies any unusual skin lesions or rashes  RESPIRATORY: denies shortness of breath with exertion, no cough  CARDIOVASCULAR: denies chest pain on exertion  GI: denies abdominal pain.  GERD symptoms controlled with medication and dietary  precautions  : normal bladder  NEURO: denies headaches, denies dizziness    EXAM:  /70   Pulse 83   Temp 97.9 °F (36.6 °C) (Temporal)   Resp 16   Ht 5' 4\" (1.626 m)   Wt 193 lb (87.5 kg)   SpO2 98%   BMI 33.13 kg/m²   Wt Readings from Last 6 Encounters:   03/04/25 193 lb (87.5 kg)   02/18/25 192 lb (87.1 kg)   02/13/25 195 lb (88.5 kg)   01/02/25 195 lb (88.5 kg)   12/30/24 180 lb (81.6 kg)   11/01/24 192 lb 4 oz (87.2 kg)     GENERAL: well developed, well nourished,in no apparent distress, pleasant affect  SKIN: no rashes,no suspicious lesions  HEENT: atraumatic, normocephalic,  Conj clear\  NECK: supple,no adenopathy,noTM, no JVD  LUNGS: clear to auscultation  CARDIO: RRR without murmur  GI: NABS, soft, obese, no tenderness, no masses, no HSM, ND  EXTREMITIES: tr-1+ distal lower extremity edema bilaterally, calf tenderness.  Inconsistent left Sanjeev's positive.  Bilateral knees with medial and lateral joint line tenderness.  Good range of motion.  NEURO: A&O x3, no focal deficits  Steady, limping gait    ASSESSMENT AND PLAN:    1. Essential hypertension  Reviewed diet and exercise.  Blood pressure better on recheck.  Electrolytes normal.  Continue current medication.  - CBC With Differential With Platelet; Future  - Comp Metabolic Panel (14); Future    2. JOSÉ LUIS on CPAP  Using CPAP regularly.  CPM  Weight loss.    3. Gastroesophageal reflux disease without esophagitis  Clinically doing well with avoidance of dietary triggers and omeprazole daily.  Will with flareup without medication.    4. Controlled type 2 diabetes mellitus with microalbuminuria, without long-term current use of insulin (HCC)  5. Type 2 diabetes mellitus with obesity (HCC)  Reviewed diet and exercise.  Fasting blood sugar 111, hemoglobin A1c 6.4, slightly elevated compared to previous.  Diabetes well-controlled.  Continue metformin twice daily.  Reviewed footcare.  Noted urine for microalbumin/creatinine slightly elevated.  Previously  normal.  Continue ACE inhibitor and statin.  - Comp Metabolic Panel (14); Future  - Hemoglobin A1C; Future    6. Mixed hyperlipidemia  Lipid panel reviewed with triglycerides slightly elevated 156.  HDL good at 75.  , slightly above goal, but improved compared to previous.  LFTs normal.   Reviewed diet and exercise.  Advise increase rosuvastatin to 10 mg nightly.  Patient will take 2 tablets of 5 mg dose for now.  If tolerates well, will give 10 mg tablet with next prescription.  - Comp Metabolic Panel (14); Future  - Lipid Panel; Future    7. Bilateral edema of lower extremity  8. Bilateral calf pain  9. History of international travel  Elevate legs.  Notes some improvement so prefer to avoid Lasix.  Patient on lisinopril HCTZ.  Call or follow-up if symptoms persist or worsen  - US VENOUS DOPPLER LEG BILAT - DIAG IMG (CPT=93970); Future    10. Primary osteoarthritis of right knee  Chronic pain which limits activity.  Conservative measures do not seem to be working as well now.  Considering TKR  Tylenol arthritis as needed.  Prefer avoidance of NSAIDs.  Topical analgesics as needed.  HEP  - Ortho Referral - In Network    11. Encounter for medication monitoring  - CBC With Differential With Platelet; Future  - Comp Metabolic Panel (14); Future  - Lipid Panel; Future  - Hemoglobin A1C; Future

## 2025-03-20 ENCOUNTER — HOSPITAL ENCOUNTER (OUTPATIENT)
Dept: ULTRASOUND IMAGING | Age: 65
Discharge: HOME | End: 2025-03-20
Attending: FAMILY MEDICINE
Payer: COMMERCIAL

## 2025-03-20 DIAGNOSIS — M79.661 BILATERAL CALF PAIN: ICD-10-CM

## 2025-03-20 DIAGNOSIS — Z78.9 HISTORY OF INTERNATIONAL TRAVEL: ICD-10-CM

## 2025-03-20 DIAGNOSIS — R60.0 BILATERAL EDEMA OF LOWER EXTREMITY: ICD-10-CM

## 2025-03-20 DIAGNOSIS — M79.662 BILATERAL CALF PAIN: ICD-10-CM

## 2025-03-20 PROCEDURE — 93970 EXTREMITY STUDY: CPT | Performed by: FAMILY MEDICINE

## 2025-05-19 ENCOUNTER — OFFICE VISIT (OUTPATIENT)
Facility: CLINIC | Age: 65
End: 2025-05-19
Payer: COMMERCIAL

## 2025-05-19 ENCOUNTER — TELEPHONE (OUTPATIENT)
Dept: ORTHOPEDICS CLINIC | Facility: CLINIC | Age: 65
End: 2025-05-19

## 2025-05-19 VITALS — HEIGHT: 61.5 IN | BODY MASS INDEX: 36.46 KG/M2 | WEIGHT: 195.63 LBS

## 2025-05-19 DIAGNOSIS — M17.11 PRIMARY OSTEOARTHRITIS OF RIGHT KNEE: Primary | ICD-10-CM

## 2025-05-19 PROCEDURE — 99214 OFFICE O/P EST MOD 30 MIN: CPT | Performed by: ORTHOPAEDIC SURGERY

## 2025-05-19 PROCEDURE — 3008F BODY MASS INDEX DOCD: CPT | Performed by: ORTHOPAEDIC SURGERY

## 2025-05-19 NOTE — PROGRESS NOTES
SURGERY SCHEDULING SHEET    Fernanda Starkey  12/10/1960  [id number]    Procedure: Right total knee arthroplasty    CPT: 08147    Diagnosis: Right knee osteoarthritis    Anesthesia: Choice    Length of Surgery: 2 hours    Disposition: Inpatient    Instruments: Medacta TKA    Assist: If case scheduled on Thurs/Fri, then German Roca first assist. If case scheduled on Tues, then Volodymyr Kainblake ([phone]) first assist. If Volodymyr unavailable, then please communicate to German Roca/Domenic/Ed to cancel German's clinic for that day and have German first assist. If German unavailable, contact Jerry Benoit for first assist. If Volodymyr and Jerry unavailable, then contact [facility] for first assist.    Pre-op Testing: CBC, CMP, PT/INR, PTT, TYPE AND SCREEN, and MRSA/MSSA THROUGH EDW PAT    Clearance: MEDICAL/PCP    Post op: 2 weeks postop appointment with German Roca    Surgery date specifics: Next available    Doug Wilson MD, FAAOS  [facility]  Phone: [phone]  Fax: [phone]

## 2025-05-19 NOTE — PROGRESS NOTES
[facility] Progress Note    Subjective: Patient returns to clinic today after having her last injection last fall.  She is present with her spouse.  They note she had a few months of relief but symptoms returned.  Is become more significantly painful about the knee.  Pain is most about the medial aspect, radiates throughout the knee.  Symptoms worse with weightbearing and have been becoming more activity and lifestyle limiting.  She does note that she is still dealing with some pain about the left knee that has been replaced and worked up in the past.  She traces along the distal IT band when asked where the symptoms occur.    Objective: Patient appears comfortable, very pleasant.  She demonstrates full extension of the right knee, flexion around 100.  Left knee with full extension, flexion 110.      Labs: Most recent hemoglobin A1c was 6.4  3 months ago      Assessment/Plan: 64-year-old female with symptomatic radiographically severe right knee osteoarthritis.  I discussed the etiology, natural history, and management options for symptomatic knee osteoarthritis.  I discussed nonsurgical and surgical treatments, with nonsurgical treatments to include anti-inflammatory medications, injections, activity modification, weight loss, low impact exercise and possible therapy.  Surgery would be with knee replacement and is an elective operation reserved for when nonsurgical treatments no longer alleviate symptoms sufficiently.  The patient has reasonably attempted nonsurgical treatments as mentioned above and remains limited in activities of daily living secondary to pain from knee arthritis.  I discussed risks and benefits of surgery, with risks including but not limited to infection, blood clots, fracture, bleeding/need for blood transfusion, injury to blood vessels and nerves, loosening or failure of components, stiffness, continued pain, need for further intervention or revision surgery.  Additionally I discussed  expectations with regards to the postoperative recovery timeframe, the possibility of mechanical clicking with the knee, numbness on the lateral side of the knee/leg from sacrifice of the infrapatellar branch of the saphenous nerve, and potential for difficulty/pain with kneeling and performing deep flexion activities.  We discussed timeframe and expectations for recovery as well, in particular given her experience with the left knee replacement.  The patient understands this discussion and elects to proceed with knee replacement surgery.  She will need primary care evaluation and clearance within 30 days of the surgery date.  Binder was provided today.  X-rays were ordered to be updated as well.    Doug Wilson MD, FAAOS  [facility]  Phone [phone]  Fax [phone]

## 2025-05-20 ENCOUNTER — HOSPITAL ENCOUNTER (OUTPATIENT)
Dept: GENERAL RADIOLOGY | Age: 65
Discharge: HOME | End: 2025-05-20
Attending: ORTHOPAEDIC SURGERY
Payer: COMMERCIAL

## 2025-05-20 ENCOUNTER — TELEPHONE (OUTPATIENT)
Dept: FAMILY MEDICINE CLINIC | Facility: CLINIC | Age: 65
End: 2025-05-20

## 2025-05-20 DIAGNOSIS — M17.11 PRIMARY OSTEOARTHRITIS OF RIGHT KNEE: ICD-10-CM

## 2025-05-20 PROCEDURE — 73562 X-RAY EXAM OF KNEE 3: CPT | Performed by: ORTHOPAEDIC SURGERY

## 2025-05-20 PROCEDURE — 77073 BONE LENGTH STUDIES: CPT | Performed by: ORTHOPAEDIC SURGERY

## 2025-06-07 NOTE — PATIENT INSTRUCTIONS
Refill policies:    Allow 2-3 business days for refills; controlled substances may take longer.  Contact your pharmacy at least 5 days prior to running out of medication and have them send an electronic request or submit request through the “request refill” option in your MOGL account.  Refills are not addressed on weekends; covering physicians do not authorize routine medications on weekends.  No narcotics or controlled substances are refilled after noon on Fridays or by on call physicians.  By law, narcotics must be electronically prescribed.  A 30 day supply with no refills is the maximum allowed.  If your prescription is due for a refill, you may be due for a follow up appointment.  To best provide you care, patients receiving routine medications need to be seen at least once a year.  Patients receiving narcotic/controlled substance medications need to be seen at least once every 3 months.  In the event that your preferred pharmacy does not have the requested medication in stock (e.g. Backordered), it is your responsibility to find another pharmacy that has the requested medication available.  We will gladly send a new prescription to that pharmacy at your request.    Scheduling Tests:    If your physician has ordered radiology tests such as MRI or CT scans, please contact Central Scheduling at [phone] right away to schedule the test.  Once scheduled, the [facility] Centralized Referral Team will work with your insurance carrier to obtain pre-certification or prior authorization.  Depending on your insurance carrier, approval may take 3-10 days.  It is highly recommended patients assure they have received an authorization before having a test performed.  If test is done without insurance authorization, patient may be responsible for the entire amount billed.      Precertification and Prior Authorizations:  If your physician has recommended that you have a procedure or additional testing performed the [facility]  Centralized Referral Team will contact your insurance carrier to obtain pre-certification or prior authorization.    You are strongly encouraged to contact your insurance carrier to verify that your procedure/test has been approved and is a COVERED benefit.  Although the [facility] Centralized Referral Team does its due diligence, the insurance carrier gives the disclaimer that \"Although the procedure is authorized, this does not guarantee payment.\"    Ultimately the patient is responsible for payment.   Thank you for your understanding in this matter.  Paperwork Completion:  If you require FMLA or disability paperwork for your recovery, please make sure to either drop it off or have it faxed to our office at [phone]. Be sure the form has your name and date of birth on it.  The form will be faxed to our Forms Department and they will complete it for you.  There is a 25$ fee for all forms that need to be filled out.  Please be aware there is a 10-14 day turnaround time.  You will need to sign a release of information (MAYCOL) form if your paperwork does not come with one.  You may call the Forms Department with any questions at [phone].  Their fax number is [phone].     1 person assist

## 2025-07-07 ENCOUNTER — HOSPITAL ENCOUNTER (OUTPATIENT)
Dept: PHYSICAL THERAPY | Facility: HOSPITAL | Age: 65
Discharge: HOME | End: 2025-07-07
Attending: ORTHOPAEDIC SURGERY
Payer: COMMERCIAL

## 2025-07-07 DIAGNOSIS — Z01.818 PRE-OP TESTING: ICD-10-CM

## 2025-07-07 DIAGNOSIS — M17.11 PRIMARY OSTEOARTHRITIS OF RIGHT KNEE: ICD-10-CM

## 2025-07-22 ENCOUNTER — TELEPHONE (OUTPATIENT)
Dept: FAMILY MEDICINE CLINIC | Facility: CLINIC | Age: 65
End: 2025-07-22

## 2025-07-22 DIAGNOSIS — E11.29 CONTROLLED TYPE 2 DIABETES MELLITUS WITH MICROALBUMINURIA, WITHOUT LONG-TERM CURRENT USE OF INSULIN (HCC): Primary | ICD-10-CM

## 2025-07-22 DIAGNOSIS — R80.9 CONTROLLED TYPE 2 DIABETES MELLITUS WITH MICROALBUMINURIA, WITHOUT LONG-TERM CURRENT USE OF INSULIN (HCC): Primary | ICD-10-CM

## 2025-07-22 NOTE — TELEPHONE ENCOUNTER
Pt spouse called office requesting referral for Dr. Martha Rick. Pt has OV scheduled 9/23 and was informed by optho office they are in need of a referral. Pt's OV date will surpass current referral in system.     Dr. Lucas- optho referral pended, please submit if OK.

## 2025-07-23 NOTE — TELEPHONE ENCOUNTER
Referral # Creation Date Referral Status Status Update    [id number] 07/22/2025 Authorized 07/22/2025: Status History     Called and spoke with pt's , daphnie, to inform Ophtha referral to Dr. Rick placed as requested. No further questions or concerns. Pt verbalized understanding and agreed with POC.

## 2025-08-01 ENCOUNTER — OFFICE VISIT (OUTPATIENT)
Dept: FAMILY MEDICINE CLINIC | Facility: CLINIC | Age: 65
End: 2025-08-01

## 2025-08-01 VITALS
WEIGHT: 197.63 LBS | BODY MASS INDEX: 36.83 KG/M2 | SYSTOLIC BLOOD PRESSURE: 124 MMHG | HEART RATE: 81 BPM | HEIGHT: 61.5 IN | RESPIRATION RATE: 17 BRPM | OXYGEN SATURATION: 97 % | TEMPERATURE: 97 F | DIASTOLIC BLOOD PRESSURE: 70 MMHG

## 2025-08-01 DIAGNOSIS — M17.11 PRIMARY OSTEOARTHRITIS OF RIGHT KNEE: Primary | ICD-10-CM

## 2025-08-01 DIAGNOSIS — D32.9 MENINGIOMA (HCC): ICD-10-CM

## 2025-08-01 DIAGNOSIS — K21.9 GASTROESOPHAGEAL REFLUX DISEASE WITHOUT ESOPHAGITIS: ICD-10-CM

## 2025-08-01 DIAGNOSIS — K76.0 HEPATIC STEATOSIS: ICD-10-CM

## 2025-08-01 DIAGNOSIS — E11.69 TYPE 2 DIABETES MELLITUS WITH OBESITY (HCC): ICD-10-CM

## 2025-08-01 DIAGNOSIS — E78.2 MIXED HYPERLIPIDEMIA: ICD-10-CM

## 2025-08-01 DIAGNOSIS — G47.33 OSA ON CPAP: ICD-10-CM

## 2025-08-01 DIAGNOSIS — I67.9 CEREBROVASCULAR SMALL VESSEL DISEASE: ICD-10-CM

## 2025-08-01 DIAGNOSIS — Z01.818 PREOP EXAMINATION: ICD-10-CM

## 2025-08-01 DIAGNOSIS — H90.3 SENSORINEURAL HEARING LOSS, BILATERAL: ICD-10-CM

## 2025-08-01 DIAGNOSIS — E66.9 TYPE 2 DIABETES MELLITUS WITH OBESITY (HCC): ICD-10-CM

## 2025-08-01 DIAGNOSIS — R80.9 CONTROLLED TYPE 2 DIABETES MELLITUS WITH MICROALBUMINURIA, WITHOUT LONG-TERM CURRENT USE OF INSULIN (HCC): ICD-10-CM

## 2025-08-01 DIAGNOSIS — I10 PRIMARY HYPERTENSION: ICD-10-CM

## 2025-08-01 DIAGNOSIS — E11.29 CONTROLLED TYPE 2 DIABETES MELLITUS WITH MICROALBUMINURIA, WITHOUT LONG-TERM CURRENT USE OF INSULIN (HCC): ICD-10-CM

## 2025-08-01 DIAGNOSIS — Z51.81 ENCOUNTER FOR MEDICATION MONITORING: ICD-10-CM

## 2025-08-01 DIAGNOSIS — M47.26 OSTEOARTHRITIS OF SPINE WITH RADICULOPATHY, LUMBAR REGION: ICD-10-CM

## 2025-08-01 DIAGNOSIS — Z97.4 WEARS HEARING AID: ICD-10-CM

## 2025-08-01 LAB
ATRIAL RATE: 75 BPM
P AXIS: 20 DEGREES
P-R INTERVAL: 140 MS
Q-T INTERVAL: 368 MS
QRS DURATION: 88 MS
QTC CALCULATION (BEZET): 410 MS
R AXIS: 4 DEGREES
T AXIS: 52 DEGREES
VENTRICULAR RATE: 75 BPM

## 2025-08-01 PROCEDURE — 3044F HG A1C LEVEL LT 7.0%: CPT | Performed by: FAMILY MEDICINE

## 2025-08-01 PROCEDURE — 99214 OFFICE O/P EST MOD 30 MIN: CPT | Performed by: FAMILY MEDICINE

## 2025-08-01 PROCEDURE — 3074F SYST BP LT 130 MM HG: CPT | Performed by: FAMILY MEDICINE

## 2025-08-01 PROCEDURE — 3078F DIAST BP <80 MM HG: CPT | Performed by: FAMILY MEDICINE

## 2025-08-01 PROCEDURE — 99499 UNLISTED E&M SERVICE: CPT | Performed by: FAMILY MEDICINE

## 2025-08-01 PROCEDURE — 3008F BODY MASS INDEX DOCD: CPT | Performed by: FAMILY MEDICINE

## 2025-08-01 PROCEDURE — 93000 ELECTROCARDIOGRAM COMPLETE: CPT | Performed by: FAMILY MEDICINE

## 2025-08-01 RX ORDER — GABAPENTIN 300 MG/1
300 CAPSULE ORAL NIGHTLY
Qty: 90 CAPSULE | Refills: 2 | Status: SHIPPED | OUTPATIENT
Start: 2025-08-01

## 2025-08-02 ENCOUNTER — LAB ENCOUNTER (OUTPATIENT)
Dept: LAB | Age: 65
End: 2025-08-02
Attending: FAMILY MEDICINE

## 2025-08-02 DIAGNOSIS — M17.11 PRIMARY OSTEOARTHRITIS OF RIGHT KNEE: ICD-10-CM

## 2025-08-02 DIAGNOSIS — E78.2 MIXED HYPERLIPIDEMIA: ICD-10-CM

## 2025-08-02 DIAGNOSIS — E11.29 CONTROLLED TYPE 2 DIABETES MELLITUS WITH MICROALBUMINURIA, WITHOUT LONG-TERM CURRENT USE OF INSULIN (HCC): ICD-10-CM

## 2025-08-02 DIAGNOSIS — Z51.81 ENCOUNTER FOR MEDICATION MONITORING: ICD-10-CM

## 2025-08-02 DIAGNOSIS — R80.9 CONTROLLED TYPE 2 DIABETES MELLITUS WITH MICROALBUMINURIA, WITHOUT LONG-TERM CURRENT USE OF INSULIN (HCC): ICD-10-CM

## 2025-08-02 DIAGNOSIS — E66.9 TYPE 2 DIABETES MELLITUS WITH OBESITY (HCC): ICD-10-CM

## 2025-08-02 DIAGNOSIS — E11.69 TYPE 2 DIABETES MELLITUS WITH OBESITY (HCC): ICD-10-CM

## 2025-08-02 DIAGNOSIS — Z01.818 PRE-OP TESTING: ICD-10-CM

## 2025-08-02 DIAGNOSIS — Z01.818 PREOP EXAMINATION: ICD-10-CM

## 2025-08-02 DIAGNOSIS — I10 ESSENTIAL HYPERTENSION: ICD-10-CM

## 2025-08-02 LAB
ALBUMIN SERPL-MCNC: 4.8 G/DL (ref 3.2–4.8)
ALBUMIN/GLOB SERPL: 1.8 (ref 1–2)
ALP LIVER SERPL-CCNC: 86 U/L (ref 50–130)
ALT SERPL-CCNC: 32 U/L (ref 10–49)
ANION GAP SERPL CALC-SCNC: 9 MMOL/L (ref 0–18)
ANTIBODY SCREEN: NEGATIVE
APTT PPP: 24.5 SECONDS (ref 23–36)
AST SERPL-CCNC: 30 U/L (ref ?–34)
BASOPHILS # BLD AUTO: 0.07 X10(3) UL (ref 0–0.2)
BASOPHILS NFR BLD AUTO: 0.8 %
BILIRUB SERPL-MCNC: 0.9 MG/DL (ref 0.2–1.1)
BUN BLD-MCNC: 13 MG/DL (ref 9–23)
CALCIUM BLD-MCNC: 10.6 MG/DL (ref 8.7–10.6)
CHLORIDE SERPL-SCNC: 101 MMOL/L (ref 98–112)
CHOLEST SERPL-MCNC: 254 MG/DL (ref ?–200)
CO2 SERPL-SCNC: 32 MMOL/L (ref 21–32)
CREAT BLD-MCNC: 0.89 MG/DL (ref 0.55–1.02)
EGFRCR SERPLBLD CKD-EPI 2021: 73 ML/MIN/1.73M2 (ref 60–?)
EOSINOPHIL # BLD AUTO: 0.32 X10(3) UL (ref 0–0.7)
EOSINOPHIL NFR BLD AUTO: 3.6 %
ERYTHROCYTE [DISTWIDTH] IN BLOOD BY AUTOMATED COUNT: 13.8 %
EST. AVERAGE GLUCOSE BLD GHB EST-MCNC: 143 MG/DL (ref 68–126)
FASTING PATIENT LIPID ANSWER: YES
FASTING STATUS PATIENT QL REPORTED: YES
GLOBULIN PLAS-MCNC: 2.7 G/DL (ref 2–3.5)
GLUCOSE BLD-MCNC: 148 MG/DL (ref 70–99)
HBA1C MFR BLD: 6.6 % (ref ?–5.7)
HCT VFR BLD AUTO: 38.3 % (ref 35–48)
HDLC SERPL-MCNC: 75 MG/DL (ref 40–59)
HGB BLD-MCNC: 12.4 G/DL (ref 12–16)
IMM GRANULOCYTES # BLD AUTO: 0.03 X10(3) UL (ref 0–1)
IMM GRANULOCYTES NFR BLD: 0.3 %
INR BLD: 0.96 (ref 0.8–1.2)
LDLC SERPL CALC-MCNC: 141 MG/DL (ref ?–100)
LYMPHOCYTES # BLD AUTO: 2.91 X10(3) UL (ref 1–4)
LYMPHOCYTES NFR BLD AUTO: 33.1 %
MCH RBC QN AUTO: 28.4 PG (ref 26–34)
MCHC RBC AUTO-ENTMCNC: 32.4 G/DL (ref 31–37)
MCV RBC AUTO: 87.6 FL (ref 80–100)
MONOCYTES # BLD AUTO: 0.63 X10(3) UL (ref 0.1–1)
MONOCYTES NFR BLD AUTO: 7.2 %
NEUTROPHILS # BLD AUTO: 4.84 X10 (3) UL (ref 1.5–7.7)
NEUTROPHILS # BLD AUTO: 4.84 X10(3) UL (ref 1.5–7.7)
NEUTROPHILS NFR BLD AUTO: 55 %
NONHDLC SERPL-MCNC: 179 MG/DL (ref ?–130)
OSMOLALITY SERPL CALC.SUM OF ELEC: 297 MOSM/KG (ref 275–295)
PLATELET # BLD AUTO: 290 10(3)UL (ref 150–450)
POTASSIUM SERPL-SCNC: 4.2 MMOL/L (ref 3.5–5.1)
PROT SERPL-MCNC: 7.5 G/DL (ref 5.7–8.2)
PROTHROMBIN TIME: 12.6 SECONDS (ref 11.6–14.8)
RBC # BLD AUTO: 4.37 X10(6)UL (ref 3.8–5.3)
RH BLOOD TYPE: POSITIVE
SODIUM SERPL-SCNC: 142 MMOL/L (ref 136–145)
TRIGL SERPL-MCNC: 217 MG/DL (ref 30–149)
VLDLC SERPL CALC-MCNC: 41 MG/DL (ref 0–30)
WBC # BLD AUTO: 8.8 X10(3) UL (ref 4–11)

## 2025-08-02 PROCEDURE — 85730 THROMBOPLASTIN TIME PARTIAL: CPT

## 2025-08-02 PROCEDURE — 36415 COLL VENOUS BLD VENIPUNCTURE: CPT

## 2025-08-02 PROCEDURE — 85610 PROTHROMBIN TIME: CPT

## 2025-08-02 PROCEDURE — 86901 BLOOD TYPING SEROLOGIC RH(D): CPT

## 2025-08-02 PROCEDURE — 85025 COMPLETE CBC W/AUTO DIFF WBC: CPT

## 2025-08-02 PROCEDURE — 83036 HEMOGLOBIN GLYCOSYLATED A1C: CPT

## 2025-08-02 PROCEDURE — 80061 LIPID PANEL: CPT

## 2025-08-02 PROCEDURE — 87081 CULTURE SCREEN ONLY: CPT

## 2025-08-02 PROCEDURE — 86900 BLOOD TYPING SEROLOGIC ABO: CPT

## 2025-08-02 PROCEDURE — 86850 RBC ANTIBODY SCREEN: CPT

## 2025-08-02 PROCEDURE — 80053 COMPREHEN METABOLIC PANEL: CPT

## 2025-08-04 ENCOUNTER — TELEPHONE (OUTPATIENT)
Dept: FAMILY MEDICINE CLINIC | Facility: CLINIC | Age: 65
End: 2025-08-04

## 2025-08-04 DIAGNOSIS — Z97.4 WEARS HEARING AID: ICD-10-CM

## 2025-08-04 DIAGNOSIS — H90.3 SENSORINEURAL HEARING LOSS, BILATERAL: Primary | ICD-10-CM

## 2025-08-04 RX ORDER — ROSUVASTATIN CALCIUM 5 MG/1
5 TABLET, COATED ORAL NIGHTLY
Qty: 90 TABLET | Refills: 3 | Status: SHIPPED | OUTPATIENT
Start: 2025-08-04

## 2025-08-07 ENCOUNTER — RESULTS FOLLOW-UP (OUTPATIENT)
Dept: FAMILY MEDICINE CLINIC | Facility: CLINIC | Age: 65
End: 2025-08-07

## 2025-08-12 ENCOUNTER — TELEPHONE (OUTPATIENT)
Dept: FAMILY MEDICINE CLINIC | Facility: CLINIC | Age: 65
End: 2025-08-12

## 2025-08-12 DIAGNOSIS — K76.0 FATTY LIVER: ICD-10-CM

## 2025-08-12 DIAGNOSIS — E11.69 TYPE 2 DIABETES MELLITUS WITH OBESITY (HCC): ICD-10-CM

## 2025-08-12 DIAGNOSIS — G47.33 OSA ON CPAP: ICD-10-CM

## 2025-08-12 DIAGNOSIS — M17.11 PRIMARY OSTEOARTHRITIS OF RIGHT KNEE: ICD-10-CM

## 2025-08-12 DIAGNOSIS — E66.9 TYPE 2 DIABETES MELLITUS WITH OBESITY (HCC): ICD-10-CM

## 2025-08-21 ENCOUNTER — ANESTHESIA (OUTPATIENT)
Dept: SURGERY | Facility: HOSPITAL | Age: 65
End: 2025-08-21

## 2025-08-21 ENCOUNTER — ANESTHESIA EVENT (OUTPATIENT)
Dept: SURGERY | Facility: HOSPITAL | Age: 65
End: 2025-08-21

## 2025-08-21 ENCOUNTER — APPOINTMENT (OUTPATIENT)
Dept: GENERAL RADIOLOGY | Facility: HOSPITAL | Age: 65
End: 2025-08-21
Attending: PHYSICIAN ASSISTANT

## 2025-08-21 ENCOUNTER — HOSPITAL ENCOUNTER (INPATIENT)
Facility: HOSPITAL | Age: 65
LOS: 4 days | Discharge: HOME | End: 2025-08-25
Attending: ORTHOPAEDIC SURGERY | Admitting: ORTHOPAEDIC SURGERY

## 2025-08-21 DIAGNOSIS — M17.11 PRIMARY OSTEOARTHRITIS OF RIGHT KNEE: Primary | ICD-10-CM

## 2025-08-21 DIAGNOSIS — Z01.818 PRE-OP TESTING: ICD-10-CM

## 2025-08-21 PROBLEM — E11.9 TYPE 2 DIABETES MELLITUS WITHOUT COMPLICATION, WITH LONG-TERM CURRENT USE OF INSULIN (HCC): Status: ACTIVE | Noted: 2025-08-21

## 2025-08-21 PROBLEM — Z79.4 TYPE 2 DIABETES MELLITUS WITHOUT COMPLICATION, WITH LONG-TERM CURRENT USE OF INSULIN (HCC): Status: ACTIVE | Noted: 2025-08-21

## 2025-08-21 LAB
GLUCOSE BLD-MCNC: 105 MG/DL (ref 70–99)
GLUCOSE BLD-MCNC: 124 MG/DL (ref 70–99)
GLUCOSE BLD-MCNC: 171 MG/DL (ref 70–99)
GLUCOSE BLD-MCNC: 186 MG/DL (ref 70–99)

## 2025-08-21 PROCEDURE — 27447 TOTAL KNEE ARTHROPLASTY: CPT | Performed by: ORTHOPAEDIC SURGERY

## 2025-08-21 PROCEDURE — 99223 1ST HOSP IP/OBS HIGH 75: CPT | Performed by: STUDENT IN AN ORGANIZED HEALTH CARE EDUCATION/TRAINING PROGRAM

## 2025-08-21 PROCEDURE — 73560 X-RAY EXAM OF KNEE 1 OR 2: CPT | Performed by: PHYSICIAN ASSISTANT

## 2025-08-21 PROCEDURE — 27447 TOTAL KNEE ARTHROPLASTY: CPT | Performed by: PHYSICIAN ASSISTANT

## 2025-08-21 PROCEDURE — 76942 ECHO GUIDE FOR BIOPSY: CPT | Performed by: STUDENT IN AN ORGANIZED HEALTH CARE EDUCATION/TRAINING PROGRAM

## 2025-08-21 DEVICE — IMPLANTABLE DEVICE: Type: IMPLANTABLE DEVICE | Site: KNEE | Status: FUNCTIONAL

## 2025-08-21 DEVICE — CEMENTED TOTAL KNEE: Type: IMPLANTABLE DEVICE | Site: KNEE

## 2025-08-21 DEVICE — CEMENT BNE 40GM W/ GENT HI VISC RADPQ: Type: IMPLANTABLE DEVICE | Site: KNEE | Status: FUNCTIONAL

## 2025-08-21 RX ORDER — OXYCODONE HYDROCHLORIDE 10 MG/1
10 TABLET ORAL EVERY 4 HOURS PRN
Status: DISCONTINUED | OUTPATIENT
Start: 2025-08-21 | End: 2025-08-25

## 2025-08-21 RX ORDER — ACETAMINOPHEN 325 MG/1
650 TABLET ORAL
Status: DISCONTINUED | OUTPATIENT
Start: 2025-08-21 | End: 2025-08-25

## 2025-08-21 RX ORDER — TRANEXAMIC ACID 10 MG/ML
INJECTION, SOLUTION INTRAVENOUS AS NEEDED
Status: DISCONTINUED | OUTPATIENT
Start: 2025-08-21 | End: 2025-08-21 | Stop reason: SURG

## 2025-08-21 RX ORDER — HYDROMORPHONE HYDROCHLORIDE 1 MG/ML
0.6 INJECTION, SOLUTION INTRAMUSCULAR; INTRAVENOUS; SUBCUTANEOUS EVERY 5 MIN PRN
Status: DISCONTINUED | OUTPATIENT
Start: 2025-08-21 | End: 2025-08-21 | Stop reason: HOSPADM

## 2025-08-21 RX ORDER — HYDROMORPHONE HYDROCHLORIDE 1 MG/ML
0.4 INJECTION, SOLUTION INTRAMUSCULAR; INTRAVENOUS; SUBCUTANEOUS EVERY 5 MIN PRN
Status: DISCONTINUED | OUTPATIENT
Start: 2025-08-21 | End: 2025-08-21 | Stop reason: HOSPADM

## 2025-08-21 RX ORDER — KETOROLAC TROMETHAMINE 30 MG/ML
30 INJECTION, SOLUTION INTRAMUSCULAR; INTRAVENOUS EVERY 6 HOURS
Status: DISCONTINUED | OUTPATIENT
Start: 2025-08-21 | End: 2025-08-21

## 2025-08-21 RX ORDER — DIPHENHYDRAMINE HCL 25 MG
25 CAPSULE ORAL EVERY 4 HOURS PRN
Status: DISCONTINUED | OUTPATIENT
Start: 2025-08-21 | End: 2025-08-25

## 2025-08-21 RX ORDER — ONDANSETRON 2 MG/ML
4 INJECTION INTRAMUSCULAR; INTRAVENOUS EVERY 6 HOURS PRN
Status: DISCONTINUED | OUTPATIENT
Start: 2025-08-21 | End: 2025-08-25

## 2025-08-21 RX ORDER — OXYCODONE HYDROCHLORIDE 5 MG/1
5 TABLET ORAL EVERY 4 HOURS PRN
Status: DISCONTINUED | OUTPATIENT
Start: 2025-08-21 | End: 2025-08-25

## 2025-08-21 RX ORDER — TRANEXAMIC ACID 10 MG/ML
1000 INJECTION, SOLUTION INTRAVENOUS ONCE
Status: DISCONTINUED | OUTPATIENT
Start: 2025-08-21 | End: 2025-08-21 | Stop reason: HOSPADM

## 2025-08-21 RX ORDER — CEPHALEXIN 500 MG/1
500 CAPSULE ORAL EVERY 8 HOURS SCHEDULED
Status: DISCONTINUED | OUTPATIENT
Start: 2025-08-22 | End: 2025-08-25

## 2025-08-21 RX ORDER — ACETAMINOPHEN 500 MG
1000 TABLET ORAL EVERY 8 HOURS SCHEDULED
Status: DISCONTINUED | OUTPATIENT
Start: 2025-08-21 | End: 2025-08-21

## 2025-08-21 RX ORDER — NICOTINE POLACRILEX 4 MG
15 LOZENGE BUCCAL
Status: DISCONTINUED | OUTPATIENT
Start: 2025-08-21 | End: 2025-08-25

## 2025-08-21 RX ORDER — HYDROMORPHONE HYDROCHLORIDE 1 MG/ML
0.8 INJECTION, SOLUTION INTRAMUSCULAR; INTRAVENOUS; SUBCUTANEOUS EVERY 2 HOUR PRN
Status: DISCONTINUED | OUTPATIENT
Start: 2025-08-21 | End: 2025-08-25

## 2025-08-21 RX ORDER — POLYETHYLENE GLYCOL 3350 17 G/17G
17 POWDER, FOR SOLUTION ORAL DAILY PRN
Status: DISCONTINUED | OUTPATIENT
Start: 2025-08-21 | End: 2025-08-23

## 2025-08-21 RX ORDER — TRAMADOL HYDROCHLORIDE 50 MG/1
50 TABLET ORAL EVERY 6 HOURS SCHEDULED
Status: DISCONTINUED | OUTPATIENT
Start: 2025-08-21 | End: 2025-08-25

## 2025-08-21 RX ORDER — FERROUS SULFATE 325(65) MG
325 TABLET, DELAYED RELEASE (ENTERIC COATED) ORAL
Status: DISCONTINUED | OUTPATIENT
Start: 2025-08-22 | End: 2025-08-25

## 2025-08-21 RX ORDER — DEXTROSE MONOHYDRATE 25 G/50ML
50 INJECTION, SOLUTION INTRAVENOUS
Status: DISCONTINUED | OUTPATIENT
Start: 2025-08-21 | End: 2025-08-21 | Stop reason: HOSPADM

## 2025-08-21 RX ORDER — HYDROCODONE BITARTRATE AND ACETAMINOPHEN 5; 325 MG/1; MG/1
2 TABLET ORAL ONCE AS NEEDED
Status: DISCONTINUED | OUTPATIENT
Start: 2025-08-21 | End: 2025-08-21 | Stop reason: HOSPADM

## 2025-08-21 RX ORDER — ROSUVASTATIN CALCIUM 5 MG/1
5 TABLET, COATED ORAL NIGHTLY
Status: DISCONTINUED | OUTPATIENT
Start: 2025-08-21 | End: 2025-08-25

## 2025-08-21 RX ORDER — HYDROMORPHONE HYDROCHLORIDE 1 MG/ML
0.4 INJECTION, SOLUTION INTRAMUSCULAR; INTRAVENOUS; SUBCUTANEOUS EVERY 2 HOUR PRN
Status: DISCONTINUED | OUTPATIENT
Start: 2025-08-21 | End: 2025-08-25

## 2025-08-21 RX ORDER — ACETAMINOPHEN 500 MG
1000 TABLET ORAL ONCE
Status: DISCONTINUED | OUTPATIENT
Start: 2025-08-21 | End: 2025-08-21 | Stop reason: HOSPADM

## 2025-08-21 RX ORDER — HYDROMORPHONE HYDROCHLORIDE 1 MG/ML
0.4 INJECTION, SOLUTION INTRAMUSCULAR; INTRAVENOUS; SUBCUTANEOUS EVERY 2 HOUR PRN
Status: DISCONTINUED | OUTPATIENT
Start: 2025-08-21 | End: 2025-08-21

## 2025-08-21 RX ORDER — GABAPENTIN 300 MG/1
300 CAPSULE ORAL NIGHTLY
Status: DISCONTINUED | OUTPATIENT
Start: 2025-08-21 | End: 2025-08-25

## 2025-08-21 RX ORDER — NALOXONE HYDROCHLORIDE 0.4 MG/ML
0.08 INJECTION, SOLUTION INTRAMUSCULAR; INTRAVENOUS; SUBCUTANEOUS AS NEEDED
Status: DISCONTINUED | OUTPATIENT
Start: 2025-08-21 | End: 2025-08-21 | Stop reason: HOSPADM

## 2025-08-21 RX ORDER — LISINOPRIL AND HYDROCHLOROTHIAZIDE 10; 12.5 MG/1; MG/1
1 TABLET ORAL DAILY
Status: DISCONTINUED | OUTPATIENT
Start: 2025-08-21 | End: 2025-08-21 | Stop reason: SDUPTHER

## 2025-08-21 RX ORDER — BUPIVACAINE HYDROCHLORIDE 7.5 MG/ML
INJECTION, SOLUTION INTRASPINAL AS NEEDED
Status: DISCONTINUED | OUTPATIENT
Start: 2025-08-21 | End: 2025-08-21 | Stop reason: SURG

## 2025-08-21 RX ORDER — OXYCODONE HYDROCHLORIDE 10 MG/1
10 TABLET ORAL EVERY 4 HOURS PRN
Status: DISCONTINUED | OUTPATIENT
Start: 2025-08-21 | End: 2025-08-21

## 2025-08-21 RX ORDER — ONDANSETRON 2 MG/ML
4 INJECTION INTRAMUSCULAR; INTRAVENOUS EVERY 6 HOURS PRN
Status: DISCONTINUED | OUTPATIENT
Start: 2025-08-21 | End: 2025-08-21 | Stop reason: HOSPADM

## 2025-08-21 RX ORDER — DIPHENHYDRAMINE HYDROCHLORIDE 50 MG/ML
25 INJECTION, SOLUTION INTRAMUSCULAR; INTRAVENOUS ONCE AS NEEDED
Status: ACTIVE | OUTPATIENT
Start: 2025-08-21 | End: 2025-08-21

## 2025-08-21 RX ORDER — TIZANIDINE 2 MG/1
2 TABLET ORAL EVERY 6 HOURS PRN
Status: DISCONTINUED | OUTPATIENT
Start: 2025-08-21 | End: 2025-08-25

## 2025-08-21 RX ORDER — SENNOSIDES 8.6 MG
17.2 TABLET ORAL NIGHTLY
Status: DISCONTINUED | OUTPATIENT
Start: 2025-08-21 | End: 2025-08-25

## 2025-08-21 RX ORDER — OXYCODONE HYDROCHLORIDE 5 MG/1
5 TABLET ORAL EVERY 4 HOURS PRN
Status: DISCONTINUED | OUTPATIENT
Start: 2025-08-21 | End: 2025-08-21

## 2025-08-21 RX ORDER — NICOTINE POLACRILEX 4 MG
30 LOZENGE BUCCAL
Status: DISCONTINUED | OUTPATIENT
Start: 2025-08-21 | End: 2025-08-25

## 2025-08-21 RX ORDER — PROCHLORPERAZINE EDISYLATE 5 MG/ML
5 INJECTION INTRAMUSCULAR; INTRAVENOUS EVERY 8 HOURS PRN
Status: DISCONTINUED | OUTPATIENT
Start: 2025-08-21 | End: 2025-08-25

## 2025-08-21 RX ORDER — PROCHLORPERAZINE EDISYLATE 5 MG/ML
5 INJECTION INTRAMUSCULAR; INTRAVENOUS EVERY 8 HOURS PRN
Status: DISCONTINUED | OUTPATIENT
Start: 2025-08-21 | End: 2025-08-21 | Stop reason: HOSPADM

## 2025-08-21 RX ORDER — BISACODYL 10 MG
10 SUPPOSITORY, RECTAL RECTAL
Status: DISCONTINUED | OUTPATIENT
Start: 2025-08-21 | End: 2025-08-25

## 2025-08-21 RX ORDER — PHENYLEPHRINE HCL 10 MG/ML
VIAL (ML) INJECTION AS NEEDED
Status: DISCONTINUED | OUTPATIENT
Start: 2025-08-21 | End: 2025-08-21 | Stop reason: SURG

## 2025-08-21 RX ORDER — NICOTINE POLACRILEX 4 MG
15 LOZENGE BUCCAL
Status: DISCONTINUED | OUTPATIENT
Start: 2025-08-21 | End: 2025-08-21 | Stop reason: HOSPADM

## 2025-08-21 RX ORDER — TRAMADOL HYDROCHLORIDE 50 MG/1
50 TABLET ORAL EVERY 6 HOURS SCHEDULED
Status: DISCONTINUED | OUTPATIENT
Start: 2025-08-21 | End: 2025-08-21

## 2025-08-21 RX ORDER — HYDROMORPHONE HYDROCHLORIDE 1 MG/ML
INJECTION, SOLUTION INTRAMUSCULAR; INTRAVENOUS; SUBCUTANEOUS
Status: DISCONTINUED
Start: 2025-08-21 | End: 2025-08-21 | Stop reason: WASHOUT

## 2025-08-21 RX ORDER — DOCUSATE SODIUM 100 MG/1
100 CAPSULE, LIQUID FILLED ORAL 2 TIMES DAILY
Status: DISCONTINUED | OUTPATIENT
Start: 2025-08-21 | End: 2025-08-25

## 2025-08-21 RX ORDER — DIPHENHYDRAMINE HYDROCHLORIDE 50 MG/ML
12.5 INJECTION, SOLUTION INTRAMUSCULAR; INTRAVENOUS EVERY 4 HOURS PRN
Status: DISCONTINUED | OUTPATIENT
Start: 2025-08-21 | End: 2025-08-25

## 2025-08-21 RX ORDER — HYDROCODONE BITARTRATE AND ACETAMINOPHEN 5; 325 MG/1; MG/1
1 TABLET ORAL ONCE AS NEEDED
Status: DISCONTINUED | OUTPATIENT
Start: 2025-08-21 | End: 2025-08-21 | Stop reason: HOSPADM

## 2025-08-21 RX ORDER — MIDAZOLAM HYDROCHLORIDE 1 MG/ML
INJECTION INTRAMUSCULAR; INTRAVENOUS AS NEEDED
Status: DISCONTINUED | OUTPATIENT
Start: 2025-08-21 | End: 2025-08-21 | Stop reason: SURG

## 2025-08-21 RX ORDER — NICOTINE POLACRILEX 4 MG
30 LOZENGE BUCCAL
Status: DISCONTINUED | OUTPATIENT
Start: 2025-08-21 | End: 2025-08-21 | Stop reason: HOSPADM

## 2025-08-21 RX ORDER — PANTOPRAZOLE SODIUM 20 MG/1
20 TABLET, DELAYED RELEASE ORAL
Status: DISCONTINUED | OUTPATIENT
Start: 2025-08-22 | End: 2025-08-25

## 2025-08-21 RX ORDER — ASPIRIN 81 MG/1
81 TABLET ORAL 2 TIMES DAILY
Status: DISCONTINUED | OUTPATIENT
Start: 2025-08-21 | End: 2025-08-25

## 2025-08-21 RX ORDER — HYDROMORPHONE HYDROCHLORIDE 1 MG/ML
0.8 INJECTION, SOLUTION INTRAMUSCULAR; INTRAVENOUS; SUBCUTANEOUS EVERY 2 HOUR PRN
Status: DISCONTINUED | OUTPATIENT
Start: 2025-08-21 | End: 2025-08-21

## 2025-08-21 RX ORDER — KETOROLAC TROMETHAMINE 30 MG/ML
15 INJECTION, SOLUTION INTRAMUSCULAR; INTRAVENOUS EVERY 6 HOURS
Status: DISPENSED | OUTPATIENT
Start: 2025-08-21 | End: 2025-08-23

## 2025-08-21 RX ORDER — CETIRIZINE HYDROCHLORIDE 10 MG/1
10 TABLET ORAL DAILY
Status: DISCONTINUED | OUTPATIENT
Start: 2025-08-21 | End: 2025-08-25

## 2025-08-21 RX ORDER — SODIUM PHOSPHATE, DIBASIC AND SODIUM PHOSPHATE, MONOBASIC 7; 19 G/230ML; G/230ML
1 ENEMA RECTAL ONCE AS NEEDED
Status: DISCONTINUED | OUTPATIENT
Start: 2025-08-21 | End: 2025-08-25

## 2025-08-21 RX ORDER — DEXTROSE MONOHYDRATE 25 G/50ML
50 INJECTION, SOLUTION INTRAVENOUS
Status: DISCONTINUED | OUTPATIENT
Start: 2025-08-21 | End: 2025-08-25

## 2025-08-21 RX ORDER — HYDROMORPHONE HYDROCHLORIDE 1 MG/ML
0.2 INJECTION, SOLUTION INTRAMUSCULAR; INTRAVENOUS; SUBCUTANEOUS EVERY 5 MIN PRN
Status: DISCONTINUED | OUTPATIENT
Start: 2025-08-21 | End: 2025-08-21 | Stop reason: HOSPADM

## 2025-08-21 RX ORDER — ACETAMINOPHEN 500 MG
1000 TABLET ORAL ONCE AS NEEDED
Status: DISCONTINUED | OUTPATIENT
Start: 2025-08-21 | End: 2025-08-21 | Stop reason: HOSPADM

## 2025-08-21 RX ORDER — SODIUM CHLORIDE, SODIUM LACTATE, POTASSIUM CHLORIDE, CALCIUM CHLORIDE 600; 310; 30; 20 MG/100ML; MG/100ML; MG/100ML; MG/100ML
INJECTION, SOLUTION INTRAVENOUS CONTINUOUS
Status: DISCONTINUED | OUTPATIENT
Start: 2025-08-21 | End: 2025-08-21 | Stop reason: HOSPADM

## 2025-08-21 RX ORDER — SODIUM CHLORIDE, SODIUM LACTATE, POTASSIUM CHLORIDE, CALCIUM CHLORIDE 600; 310; 30; 20 MG/100ML; MG/100ML; MG/100ML; MG/100ML
INJECTION, SOLUTION INTRAVENOUS CONTINUOUS
Status: DISCONTINUED | OUTPATIENT
Start: 2025-08-21 | End: 2025-08-25

## 2025-08-21 RX ORDER — LABETALOL HYDROCHLORIDE 5 MG/ML
5 INJECTION, SOLUTION INTRAVENOUS EVERY 5 MIN PRN
Status: DISCONTINUED | OUTPATIENT
Start: 2025-08-21 | End: 2025-08-21 | Stop reason: HOSPADM

## 2025-08-21 RX ORDER — DEXAMETHASONE SODIUM PHOSPHATE 10 MG/ML
8 INJECTION, SOLUTION INTRAMUSCULAR; INTRAVENOUS ONCE
Status: COMPLETED | OUTPATIENT
Start: 2025-08-22 | End: 2025-08-22

## 2025-08-21 RX ADMIN — BUPIVACAINE HYDROCHLORIDE 1.8 ML: 7.5 INJECTION, SOLUTION INTRASPINAL at 14:20:00

## 2025-08-21 RX ADMIN — MIDAZOLAM HYDROCHLORIDE 2 MG: 1 INJECTION INTRAMUSCULAR; INTRAVENOUS at 14:16:00

## 2025-08-21 RX ADMIN — SODIUM CHLORIDE, SODIUM LACTATE, POTASSIUM CHLORIDE, CALCIUM CHLORIDE: 600; 310; 30; 20 INJECTION, SOLUTION INTRAVENOUS at 16:28:00

## 2025-08-21 RX ADMIN — PHENYLEPHRINE HCL 100 MCG: 10 MG/ML VIAL (ML) INJECTION at 14:36:00

## 2025-08-21 RX ADMIN — TRANEXAMIC ACID 1000 MG: 10 INJECTION, SOLUTION INTRAVENOUS at 14:27:00

## 2025-08-21 RX ADMIN — SODIUM CHLORIDE, SODIUM LACTATE, POTASSIUM CHLORIDE, CALCIUM CHLORIDE: 600; 310; 30; 20 INJECTION, SOLUTION INTRAVENOUS at 14:11:00

## 2025-08-22 PROBLEM — E86.1 HYPOTENSION DUE TO HYPOVOLEMIA: Status: ACTIVE | Noted: 2025-08-22

## 2025-08-22 LAB
ANION GAP SERPL CALC-SCNC: 12 MMOL/L (ref 0–18)
BASOPHILS # BLD AUTO: 0.05 X10(3) UL (ref 0–0.2)
BASOPHILS NFR BLD AUTO: 0.4 %
BUN BLD-MCNC: 19 MG/DL (ref 9–23)
CALCIUM BLD-MCNC: 9.5 MG/DL (ref 8.7–10.6)
CHLORIDE SERPL-SCNC: 105 MMOL/L (ref 98–112)
CO2 SERPL-SCNC: 25 MMOL/L (ref 21–32)
CREAT BLD-MCNC: 1.09 MG/DL (ref 0.55–1.02)
EGFRCR SERPLBLD CKD-EPI 2021: 57 ML/MIN/1.73M2 (ref 60–?)
EOSINOPHIL # BLD AUTO: 0.07 X10(3) UL (ref 0–0.7)
EOSINOPHIL NFR BLD AUTO: 0.6 %
ERYTHROCYTE [DISTWIDTH] IN BLOOD BY AUTOMATED COUNT: 13.7 %
GLUCOSE BLD-MCNC: 150 MG/DL (ref 70–99)
GLUCOSE BLD-MCNC: 201 MG/DL (ref 70–99)
GLUCOSE BLD-MCNC: 205 MG/DL (ref 70–99)
GLUCOSE BLD-MCNC: 264 MG/DL (ref 70–99)
GLUCOSE BLD-MCNC: 290 MG/DL (ref 70–99)
HCT VFR BLD AUTO: 32.3 % (ref 35–48)
HGB BLD-MCNC: 10.6 G/DL (ref 12–16)
IMM GRANULOCYTES # BLD AUTO: 0.06 X10(3) UL (ref 0–1)
IMM GRANULOCYTES NFR BLD: 0.5 %
LYMPHOCYTES # BLD AUTO: 1.27 X10(3) UL (ref 1–4)
LYMPHOCYTES NFR BLD AUTO: 10.4 %
MCH RBC QN AUTO: 28.5 PG (ref 26–34)
MCHC RBC AUTO-ENTMCNC: 32.8 G/DL (ref 31–37)
MCV RBC AUTO: 86.8 FL (ref 80–100)
MONOCYTES # BLD AUTO: 0.34 X10(3) UL (ref 0.1–1)
MONOCYTES NFR BLD AUTO: 2.8 %
NEUTROPHILS # BLD AUTO: 10.39 X10 (3) UL (ref 1.5–7.7)
NEUTROPHILS # BLD AUTO: 10.39 X10(3) UL (ref 1.5–7.7)
NEUTROPHILS NFR BLD AUTO: 85.3 %
OSMOLALITY SERPL CALC.SUM OF ELEC: 302 MOSM/KG (ref 275–295)
PLATELET # BLD AUTO: 236 10(3)UL (ref 150–450)
POTASSIUM SERPL-SCNC: 3.8 MMOL/L (ref 3.5–5.1)
RBC # BLD AUTO: 3.72 X10(6)UL (ref 3.8–5.3)
SODIUM SERPL-SCNC: 142 MMOL/L (ref 136–145)
WBC # BLD AUTO: 12.2 X10(3) UL (ref 4–11)

## 2025-08-22 PROCEDURE — 99024 POSTOP FOLLOW-UP VISIT: CPT | Performed by: ORTHOPAEDIC SURGERY

## 2025-08-22 PROCEDURE — 99232 SBSQ HOSP IP/OBS MODERATE 35: CPT | Performed by: HOSPITALIST

## 2025-08-22 RX ORDER — SENNA AND DOCUSATE SODIUM 50; 8.6 MG/1; MG/1
1 TABLET, FILM COATED ORAL 2 TIMES DAILY PRN
Qty: 30 TABLET | Refills: 0 | Status: SHIPPED | OUTPATIENT
Start: 2025-08-22

## 2025-08-22 RX ORDER — CEPHALEXIN 500 MG/1
500 CAPSULE ORAL EVERY 8 HOURS SCHEDULED
Qty: 18 CAPSULE | Refills: 0 | Status: SHIPPED | OUTPATIENT
Start: 2025-08-22 | End: 2025-08-28

## 2025-08-22 RX ORDER — ACETAMINOPHEN 500 MG
1000 TABLET ORAL EVERY 8 HOURS
Qty: 90 TABLET | Refills: 0 | Status: SHIPPED | OUTPATIENT
Start: 2025-08-22 | End: 2025-09-06

## 2025-08-22 RX ORDER — TRAMADOL HYDROCHLORIDE 50 MG/1
50 TABLET ORAL EVERY 8 HOURS
Qty: 45 TABLET | Refills: 0 | Status: SHIPPED | OUTPATIENT
Start: 2025-08-22 | End: 2025-09-06

## 2025-08-22 RX ORDER — OXYCODONE HYDROCHLORIDE 5 MG/1
TABLET ORAL EVERY 4 HOURS PRN
Qty: 40 TABLET | Refills: 0 | Status: SHIPPED | OUTPATIENT
Start: 2025-08-22

## 2025-08-22 RX ORDER — CELECOXIB 200 MG/1
200 CAPSULE ORAL DAILY
Qty: 30 CAPSULE | Refills: 0 | Status: SHIPPED | OUTPATIENT
Start: 2025-08-22 | End: 2025-09-21

## 2025-08-22 RX ORDER — ASPIRIN 81 MG/1
81 TABLET ORAL 2 TIMES DAILY
Qty: 80 TABLET | Refills: 0 | Status: SHIPPED | OUTPATIENT
Start: 2025-08-22 | End: 2025-10-01

## 2025-08-23 LAB
BASOPHILS # BLD AUTO: 0.02 X10(3) UL (ref 0–0.2)
BASOPHILS NFR BLD AUTO: 0.1 %
EOSINOPHIL # BLD AUTO: 0 X10(3) UL (ref 0–0.7)
EOSINOPHIL NFR BLD AUTO: 0 %
ERYTHROCYTE [DISTWIDTH] IN BLOOD BY AUTOMATED COUNT: 13.2 %
GLUCOSE BLD-MCNC: 195 MG/DL (ref 70–99)
GLUCOSE BLD-MCNC: 226 MG/DL (ref 70–99)
GLUCOSE BLD-MCNC: 234 MG/DL (ref 70–99)
GLUCOSE BLD-MCNC: 257 MG/DL (ref 70–99)
HCT VFR BLD AUTO: 31.2 % (ref 35–48)
HGB BLD-MCNC: 10.3 G/DL (ref 12–16)
IMM GRANULOCYTES # BLD AUTO: 0.12 X10(3) UL (ref 0–1)
IMM GRANULOCYTES NFR BLD: 0.8 %
LYMPHOCYTES # BLD AUTO: 1.89 X10(3) UL (ref 1–4)
LYMPHOCYTES NFR BLD AUTO: 12.8 %
MCH RBC QN AUTO: 28.5 PG (ref 26–34)
MCHC RBC AUTO-ENTMCNC: 33 G/DL (ref 31–37)
MCV RBC AUTO: 86.4 FL (ref 80–100)
MONOCYTES # BLD AUTO: 0.93 X10(3) UL (ref 0.1–1)
MONOCYTES NFR BLD AUTO: 6.3 %
NEUTROPHILS # BLD AUTO: 11.75 X10 (3) UL (ref 1.5–7.7)
NEUTROPHILS # BLD AUTO: 11.75 X10(3) UL (ref 1.5–7.7)
NEUTROPHILS NFR BLD AUTO: 80 %
PLATELET # BLD AUTO: 224 10(3)UL (ref 150–450)
RBC # BLD AUTO: 3.61 X10(6)UL (ref 3.8–5.3)
WBC # BLD AUTO: 14.7 X10(3) UL (ref 4–11)

## 2025-08-23 PROCEDURE — 99232 SBSQ HOSP IP/OBS MODERATE 35: CPT | Performed by: HOSPITALIST

## 2025-08-23 PROCEDURE — 99024 POSTOP FOLLOW-UP VISIT: CPT | Performed by: ORTHOPAEDIC SURGERY

## 2025-08-23 RX ORDER — POLYETHYLENE GLYCOL 3350 17 G/17G
17 POWDER, FOR SOLUTION ORAL DAILY
Status: DISCONTINUED | OUTPATIENT
Start: 2025-08-23 | End: 2025-08-25

## 2025-08-24 LAB
GLUCOSE BLD-MCNC: 133 MG/DL (ref 70–99)
GLUCOSE BLD-MCNC: 150 MG/DL (ref 70–99)
GLUCOSE BLD-MCNC: 178 MG/DL (ref 70–99)
GLUCOSE BLD-MCNC: 201 MG/DL (ref 70–99)

## 2025-08-24 PROCEDURE — 99232 SBSQ HOSP IP/OBS MODERATE 35: CPT | Performed by: HOSPITALIST

## 2025-08-24 RX ORDER — LISINOPRIL 10 MG/1
10 TABLET ORAL DAILY
Status: DISCONTINUED | OUTPATIENT
Start: 2025-08-24 | End: 2025-08-25

## 2025-08-25 VITALS
WEIGHT: 192 LBS | HEIGHT: 61.5 IN | BODY MASS INDEX: 35.79 KG/M2 | DIASTOLIC BLOOD PRESSURE: 58 MMHG | SYSTOLIC BLOOD PRESSURE: 132 MMHG | TEMPERATURE: 98 F | RESPIRATION RATE: 20 BRPM | HEART RATE: 78 BPM | OXYGEN SATURATION: 96 %

## 2025-08-25 LAB
GLUCOSE BLD-MCNC: 142 MG/DL (ref 70–99)
GLUCOSE BLD-MCNC: 148 MG/DL (ref 70–99)

## 2025-08-25 PROCEDURE — 99231 SBSQ HOSP IP/OBS SF/LOW 25: CPT | Performed by: HOSPITALIST

## 2025-08-29 ENCOUNTER — MED REC SCAN ONLY (OUTPATIENT)
Dept: ORTHOPEDICS CLINIC | Facility: CLINIC | Age: 65
End: 2025-08-29

## (undated) DIAGNOSIS — E78.2 MIXED HYPERLIPIDEMIA: ICD-10-CM

## (undated) DIAGNOSIS — R73.03 PREDIABETES: ICD-10-CM

## (undated) DEVICE — ANTISEPTIC 4OZ 70% ISO ALC

## (undated) DEVICE — Device

## (undated) DEVICE — DECANTER BAG 9": Brand: MEDLINE INDUSTRIES, INC.

## (undated) DEVICE — PLASTC TOOMEY SYRNG DISP

## (undated) DEVICE — PAIN TRAY: Brand: MEDLINE INDUSTRIES, INC.

## (undated) DEVICE — BANDAGE COBAN 4IN X 5YD TAN E POR SLF ADH COBAN

## (undated) DEVICE — TOURNIQUET CUFF 30 SINGLE

## (undated) DEVICE — BANDAGE ADH W1INXL3IN NAT FAB WVN N ADH PD

## (undated) DEVICE — HOOD: Brand: FLYTE

## (undated) DEVICE — GLOVE SUR 7.5 SENSICARE PI PIP GRN PWD F

## (undated) DEVICE — STERILE POLYISOPRENE POWDER-FREE SURGICAL GLOVES: Brand: PROTEXIS

## (undated) DEVICE — PACK PAIN MGMT

## (undated) DEVICE — PADDING CAST 4INX4YD 100% COT SFT SLF BOND

## (undated) DEVICE — MARKER SKIN 2 TIP

## (undated) DEVICE — WRAP COMPR UNIV KNEE HOT CLD GEL MICWV AND

## (undated) DEVICE — NEEDLE SPNL 18GA L3.5IN PNK QNCKE STYL DISP

## (undated) DEVICE — HOOD STERI-SHIELD 408-800-000

## (undated) DEVICE — CHLORAPREP 26ML APPLICATOR

## (undated) DEVICE — LIGHT HANDLE

## (undated) DEVICE — GLOVE SUR 7.5 SENSICARE PI PIP CRM PWD F

## (undated) DEVICE — SOLUTION PREP 4OZ 10% POVIDONE IOD SCR TOP

## (undated) DEVICE — GLOVE SUR 7 SENSICARE PIP WHT PWD F

## (undated) DEVICE — NEEDLE SPNL 22GA L3.5IN BLK HUB SS RW FIT

## (undated) DEVICE — SOLUTION IRRIG 3000ML 0.9% NACL FLX CONT

## (undated) DEVICE — COVER LT HNDL RIG FOR SUR CAM DISP

## (undated) DEVICE — 2C14 #2 PDO 45 X 45: Brand: 2C14 #2 PDO 45 X 45

## (undated) DEVICE — ZIMMER® STERILE DISPOSABLE TOURNIQUET CUFF WITH PLC, DUAL PORT, SINGLE BLADDER, 34 IN. (86 CM)

## (undated) DEVICE — Device: Brand: STABLECUT®

## (undated) DEVICE — GLOVE SUR 8.5 SENSICARE PI PIP GRN PWD F

## (undated) DEVICE — PROXIMATE SKIN STAPLERS (35 WIDE) CONTAINS 35 STAINLESS STEEL STAPLES (FIXED HEAD): Brand: PROXIMATE

## (undated) DEVICE — GLOVE SUR 6.5 SENSICARE PIP WHT PWD F

## (undated) DEVICE — GLOVE SUR 8 SENSICARE PI PIP CRM PWD F

## (undated) DEVICE — CLEANER ES TIP W2XL2IN ADH BK RADPQ FOR SS

## (undated) DEVICE — SOLUTION SCRB 4OZ 4% CHG ANTISEPSIS HIBICLN

## (undated) DEVICE — CHLORAPREP ORANGE TINT 10.5ML

## (undated) DEVICE — SYRINGE MED 30ML STD CLR PLAS LL TIP N CTRL

## (undated) DEVICE — SPECIMEN CONTAINER,POSITIVE SEAL INDICATOR, OR PACKAGED: Brand: PRECISION

## (undated) DEVICE — BOWL CEMENT MIX QUICK-VAC

## (undated) DEVICE — SKIN REG/FINE DUAL MARKER, RULER, LABELS: Brand: MEDLINE

## (undated) DEVICE — TOTAL KNEE CDS: Brand: MEDLINE INDUSTRIES, INC.

## (undated) DEVICE — UNIVERSAL STERIBUMP® STERILE (5/CASE): Brand: UNIVERSAL STERIBUMP®

## (undated) DEVICE — STOCK ORTH TUB 72X8IN NLTX

## (undated) DEVICE — GLV SURG SZ 7.5 THK10MIL WHT ISOLEX SYN

## (undated) DEVICE — SUTURE VICRYL 2-0 FSL

## (undated) DEVICE — SUT ETHBND XL 5 30IN V-37 NABSRB GRN 40MM 1/2

## (undated) DEVICE — 450 ML BOTTLE OF 0.05% CHLORHEXIDINE GLUCONATE IN 99.95% STERILE WATER FOR IRRIGATION, USP AND APPLICATOR.: Brand: IRRISEPT ANTIMICROBIAL WOUND LAVAGE

## (undated) DEVICE — SOL  .9 3000ML

## (undated) DEVICE — KENDALL SCD EXPRESS SLEEVES, KNEE LENGTH, MEDIUM: Brand: KENDALL SCD

## (undated) DEVICE — SUTURE VICRYL 0 CP-1

## (undated) DEVICE — WRAP COOLING KNEE W/ICE PILLOW

## (undated) DEVICE — DRAPE SURG L W60XL84IN SMS POLYPR U SHP FLM

## (undated) DEVICE — SLEEVE COMPR MD KNEE LEN SGL USE KENDALL SCD

## (undated) DEVICE — HOOD STERI-SHIELD 408-800-100

## (undated) DEVICE — PACK PBDS TOTAL HIP MODULE

## (undated) DEVICE — DRESSING AQUACEL W/ SILVER 3.5 X12 IN

## (undated) DEVICE — REMOVER LOT 4OZ NONIRRITATING UNSCNT SFT

## (undated) DEVICE — SUTURE ETHIBOND 1 OS-6

## (undated) NOTE — MR AVS SNAPSHOT
[facility]  [address] [place], [facility] [place] [phone]               Thank you for choosing us for your health care visit with Viviana Kilpatrick DO.   We are glad to serve you and happy to them, even if you feel better. · Drink plenty of water, hot tea, and other liquids. This may help thin mucus. It also may promote sinus drainage. · Heat may help soothe painful areas of the face. Use a towel soaked in hot water.  Or,  the shower a · Facial pain or headache becoming more severe  · Stiff neck  · Unusual drowsiness or confusion  · Swelling of the forehead or eyelids  · Vision problems, including blurred or double vision  · Fever of 100.4ºF (38ºC) or higher, or as directed by your healt You can access your MyChart to more actively manage your health care and view more details from this visit by going to [email or web address].   If you've recently had a stay at the Hospital you can access your discharge instructions in [address] by lauren

## (undated) NOTE — MR AVS SNAPSHOT
[facility] Utilities  [address] [place], [address] [place] [phone]               Thank you for choosing us for your health care visit with Zaire Gama DO.   We are glad to serve you and happy to Assoc Dx:  Tinnitus, bilateral [H93.13]          Reason for Today's Visit     Headache     Blurry Vision     Ear Pain           Medical Issues Discussed Today     Migraine    Tinnitus, bilateral    -  Primary    Perforation of tympanic membrane, bilateral use of medicines or surgery. They may help relieve symptoms and prevent migraines. Some treatment options include biofeedback and acupuncture. Ask your healthcare provider to tell you more about these treatments if you have questions. · Limit caffeine.  Ramirez Savage You can get these medications from any pharmacy     Bring a paper prescription for each of these medications    - Butalbital-APAP-Caffeine -40 MG Caps            MyChart     Visit MyChart  You can access your MyChart to more actively manage your heal

## (undated) NOTE — MR AVS SNAPSHOT
[facility] Utilities  [address] [facility],[address] [place], [address] [address] [phone]               Thank you for choosing us for your health care visit with Renato Chan MD.  We are glad to serve you and happy hot water. Or,  the shower and direct the hot spray onto your face. Using a vaporizer along with a menthol rub at night may also help.   · An expectorant containing guaifenesin may help thin the mucus and promote drainage from the sinuses.   · Over- · Fever of 100.4ºF (38ºC) or higher, or as directed by your healthcare provider  · Seizure  · Breathing problems  · Symptoms not resolving within 10 days  Date Last Reviewed: 4/13/2015  © 2502-3827 The [address] [place] [address] Corinna Lee 1 Units by Does not apply route daily.                 Where to Get Your Medications      These medications were sent to [facility] [place], [place] - [address] [place] [address] [phone], [address], [address] [place], [place] [id number]     Phone:  [phone]

## (undated) NOTE — LETTER
03/22/19        Nitesh Rangel [id number]      Dear Rolo Hays,    [address] [facility] records indicate that you have outstanding lab work and or testing that was ordered for you and has not yet been completed:  Orders Placed This Encounter

## (undated) NOTE — LETTER
[facility] Testing Department  Phone: [phone]  OUTSIDE TESTING RESULT REQUEST      TO:   Dr Miguelina Sethi Date: 11/18/20    FAX #: [phone]     IMPORTANT: FOR YOUR IMMEDIATE ATTENTION  Please

## (undated) NOTE — ED AVS SNAPSHOT
Sudha Nickiebecky   MRN: [id number]    Department:  [facility] Emergency Department   Date of Visit:  10/28/2017           Disclosure     Insurance plans vary and the physician(s) referred by the ER may not be covered by your plan.  Please contact yo If you have been prescribed any medication(s), please fill your prescription right away and begin taking the medication(s) as directed    If the emergency physician has read X-rays, these will be re-interpreted by a radiologist.  If there is a significant

## (undated) NOTE — LETTER
04/21/21        Sabina Salazar [address]      Dear David Jara,    [address] [facility] records indicate that you have outstanding lab work and or testing that was ordered for you and has not yet been completed:  Orders Placed This [place]

## (undated) NOTE — LETTER
03/20/20        Su Maya [address]      Dear Ivon Hickman,    [address] [facility] records indicate that you have outstanding lab work and or testing that was ordered for you and has not yet been completed:  Orders Placed This Encounter

## (undated) NOTE — LETTER
01/19/22        Onleia Barnett [address]      Dear Julia Nation,    [address] records indicate that you have outstanding lab work and or testing that was ordered for you and has not yet been completed:  Orders Placed This [place]

## (undated) NOTE — Clinical Note
Hi, Dr. Matt Epstein. Thank you for referring Ms. Alpesh Guzman for rheumatologic evaluation. Please see the discussion portion of my note and let me know if you have any questions.  HUA Schultz Rheumatology4/22/2019

## (undated) NOTE — LETTER
Patient Name: Honorio Iverson  YOB: 1960          MRN :  [id number]  Date:  7/9/2020  Referring Physician:  Gladys Jimenez  Pt has attended 12 visits in Physical Therapy.    Dx: R shoulder tendinitis         Insurance (Au · Pt will improve shoulder strength throughout to 4/5 to ability to carry laundry basket -part MET carrying laundry without pain  · Pt will improve neck pain and ROT AROM to 50 deg to be able to drive with less difficulty -part MET; able to rotate neck wit

## (undated) NOTE — LETTER
08/20/19        Corina Pandey [address]      Dear Yesenia La,    [address] [facility] records indicate that you have outstanding lab work and or testing that was ordered for you and has not yet been completed:  Orders Placed This Encounter

## (undated) NOTE — LETTER
Patient Name: Jack Julio  YOB: 1960          MRN number:  [id number]  Date:  11/15/2018  Referring Physician: Dr. Femi Forde     Discharge Summary  Pt has attended 10 visits in Physical Therapy.    Dx: L shoulder tendinitis         Authorized reach into overhead cabinets without pain or restriction (8 visits) - MET  · Pt will improve shoulder abduction AROM to >120 degrees to improve ability to don/doff shirts and wash hair (8 visits) -progress  · Pt will improve shoulder strength throughout to